# Patient Record
Sex: MALE | Race: WHITE | NOT HISPANIC OR LATINO | URBAN - METROPOLITAN AREA
[De-identification: names, ages, dates, MRNs, and addresses within clinical notes are randomized per-mention and may not be internally consistent; named-entity substitution may affect disease eponyms.]

---

## 2019-10-20 ENCOUNTER — INPATIENT (INPATIENT)
Facility: HOSPITAL | Age: 79
LOS: 1 days | Discharge: ANOTHER HOSPITAL | DRG: 282 | End: 2019-10-22
Attending: INTERNAL MEDICINE | Admitting: INTERNAL MEDICINE
Payer: MEDICARE

## 2019-10-20 VITALS
HEART RATE: 82 BPM | OXYGEN SATURATION: 100 % | DIASTOLIC BLOOD PRESSURE: 69 MMHG | RESPIRATION RATE: 20 BRPM | HEIGHT: 69 IN | WEIGHT: 210.1 LBS | TEMPERATURE: 97 F | SYSTOLIC BLOOD PRESSURE: 121 MMHG

## 2019-10-20 DIAGNOSIS — I48.91 UNSPECIFIED ATRIAL FIBRILLATION: ICD-10-CM

## 2019-10-20 DIAGNOSIS — I10 ESSENTIAL (PRIMARY) HYPERTENSION: ICD-10-CM

## 2019-10-20 DIAGNOSIS — E11.9 TYPE 2 DIABETES MELLITUS WITHOUT COMPLICATIONS: ICD-10-CM

## 2019-10-20 DIAGNOSIS — I25.810 ATHEROSCLEROSIS OF CORONARY ARTERY BYPASS GRAFT(S) WITHOUT ANGINA PECTORIS: ICD-10-CM

## 2019-10-20 DIAGNOSIS — Z29.9 ENCOUNTER FOR PROPHYLACTIC MEASURES, UNSPECIFIED: ICD-10-CM

## 2019-10-20 LAB
ACETONE SERPL-MCNC: NEGATIVE — SIGNIFICANT CHANGE UP
ALBUMIN SERPL ELPH-MCNC: 3.8 G/DL — SIGNIFICANT CHANGE UP (ref 3.5–5)
ALP SERPL-CCNC: 78 U/L — SIGNIFICANT CHANGE UP (ref 40–120)
ALT FLD-CCNC: 25 U/L DA — SIGNIFICANT CHANGE UP (ref 10–60)
ANION GAP SERPL CALC-SCNC: 7 MMOL/L — SIGNIFICANT CHANGE UP (ref 5–17)
APPEARANCE UR: ABNORMAL
APTT BLD: 37.2 SEC — HIGH (ref 27.5–36.3)
AST SERPL-CCNC: 20 U/L — SIGNIFICANT CHANGE UP (ref 10–40)
BACTERIA # UR AUTO: ABNORMAL /HPF
BILIRUB SERPL-MCNC: 1.3 MG/DL — HIGH (ref 0.2–1.2)
BILIRUB UR-MCNC: NEGATIVE — SIGNIFICANT CHANGE UP
BUN SERPL-MCNC: 17 MG/DL — SIGNIFICANT CHANGE UP (ref 7–18)
CALCIUM SERPL-MCNC: 8.5 MG/DL — SIGNIFICANT CHANGE UP (ref 8.4–10.5)
CHLORIDE SERPL-SCNC: 105 MMOL/L — SIGNIFICANT CHANGE UP (ref 96–108)
CO2 SERPL-SCNC: 26 MMOL/L — SIGNIFICANT CHANGE UP (ref 22–31)
COLOR SPEC: YELLOW — SIGNIFICANT CHANGE UP
CREAT SERPL-MCNC: 1.03 MG/DL — SIGNIFICANT CHANGE UP (ref 0.5–1.3)
DIFF PNL FLD: ABNORMAL
EPI CELLS # UR: SIGNIFICANT CHANGE UP /HPF
GLUCOSE SERPL-MCNC: 117 MG/DL — HIGH (ref 70–99)
GLUCOSE UR QL: 50 MG/DL
HCT VFR BLD CALC: 46.2 % — SIGNIFICANT CHANGE UP (ref 39–50)
HGB BLD-MCNC: 15.6 G/DL — SIGNIFICANT CHANGE UP (ref 13–17)
INR BLD: 1.03 RATIO — SIGNIFICANT CHANGE UP (ref 0.88–1.16)
KETONES UR-MCNC: NEGATIVE — SIGNIFICANT CHANGE UP
LEUKOCYTE ESTERASE UR-ACNC: NEGATIVE — SIGNIFICANT CHANGE UP
LIDOCAIN IGE QN: 77 U/L — SIGNIFICANT CHANGE UP (ref 73–393)
MAGNESIUM SERPL-MCNC: 2.2 MG/DL — SIGNIFICANT CHANGE UP (ref 1.6–2.6)
MCHC RBC-ENTMCNC: 28.8 PG — SIGNIFICANT CHANGE UP (ref 27–34)
MCHC RBC-ENTMCNC: 33.8 GM/DL — SIGNIFICANT CHANGE UP (ref 32–36)
MCV RBC AUTO: 85.4 FL — SIGNIFICANT CHANGE UP (ref 80–100)
NITRITE UR-MCNC: NEGATIVE — SIGNIFICANT CHANGE UP
NRBC # BLD: 0 /100 WBCS — SIGNIFICANT CHANGE UP (ref 0–0)
NT-PROBNP SERPL-SCNC: 217 PG/ML — SIGNIFICANT CHANGE UP (ref 0–450)
OB PNL STL: NEGATIVE — SIGNIFICANT CHANGE UP
PH UR: 6 — SIGNIFICANT CHANGE UP (ref 5–8)
PLATELET # BLD AUTO: 195 K/UL — SIGNIFICANT CHANGE UP (ref 150–400)
POTASSIUM SERPL-MCNC: 3.7 MMOL/L — SIGNIFICANT CHANGE UP (ref 3.5–5.3)
POTASSIUM SERPL-SCNC: 3.7 MMOL/L — SIGNIFICANT CHANGE UP (ref 3.5–5.3)
PROT SERPL-MCNC: 7.8 G/DL — SIGNIFICANT CHANGE UP (ref 6–8.3)
PROT UR-MCNC: 100
PROTHROM AB SERPL-ACNC: 11.4 SEC — SIGNIFICANT CHANGE UP (ref 10–12.9)
RBC # BLD: 5.41 M/UL — SIGNIFICANT CHANGE UP (ref 4.2–5.8)
RBC # FLD: 13.2 % — SIGNIFICANT CHANGE UP (ref 10.3–14.5)
RBC CASTS # UR COMP ASSIST: ABNORMAL /HPF (ref 0–2)
SODIUM SERPL-SCNC: 138 MMOL/L — SIGNIFICANT CHANGE UP (ref 135–145)
SP GR SPEC: 1.01 — SIGNIFICANT CHANGE UP (ref 1.01–1.02)
TROPONIN I SERPL-MCNC: 0.04 NG/ML — SIGNIFICANT CHANGE UP (ref 0–0.04)
UROBILINOGEN FLD QL: NEGATIVE — SIGNIFICANT CHANGE UP
WBC # BLD: 7.06 K/UL — SIGNIFICANT CHANGE UP (ref 3.8–10.5)
WBC # FLD AUTO: 7.06 K/UL — SIGNIFICANT CHANGE UP (ref 3.8–10.5)
WBC UR QL: SIGNIFICANT CHANGE UP /HPF (ref 0–5)

## 2019-10-20 PROCEDURE — 71045 X-RAY EXAM CHEST 1 VIEW: CPT | Mod: 26

## 2019-10-20 PROCEDURE — 99291 CRITICAL CARE FIRST HOUR: CPT

## 2019-10-20 RX ORDER — DILTIAZEM HCL 120 MG
10 CAPSULE, EXT RELEASE 24 HR ORAL ONCE
Refills: 0 | Status: COMPLETED | OUTPATIENT
Start: 2019-10-20 | End: 2019-10-20

## 2019-10-20 RX ORDER — DILTIAZEM HCL 120 MG
30 CAPSULE, EXT RELEASE 24 HR ORAL ONCE
Refills: 0 | Status: COMPLETED | OUTPATIENT
Start: 2019-10-20 | End: 2019-10-20

## 2019-10-20 RX ORDER — ENOXAPARIN SODIUM 100 MG/ML
95 INJECTION SUBCUTANEOUS
Refills: 0 | Status: DISCONTINUED | OUTPATIENT
Start: 2019-10-21 | End: 2019-10-22

## 2019-10-20 RX ORDER — ENOXAPARIN SODIUM 100 MG/ML
80 INJECTION SUBCUTANEOUS ONCE
Refills: 0 | Status: COMPLETED | OUTPATIENT
Start: 2019-10-20 | End: 2019-10-20

## 2019-10-20 RX ORDER — INSULIN LISPRO 100/ML
VIAL (ML) SUBCUTANEOUS
Refills: 0 | Status: DISCONTINUED | OUTPATIENT
Start: 2019-10-20 | End: 2019-10-22

## 2019-10-20 RX ORDER — METOPROLOL TARTRATE 50 MG
50 TABLET ORAL
Refills: 0 | Status: DISCONTINUED | OUTPATIENT
Start: 2019-10-20 | End: 2019-10-22

## 2019-10-20 RX ORDER — ATORVASTATIN CALCIUM 80 MG/1
40 TABLET, FILM COATED ORAL AT BEDTIME
Refills: 0 | Status: DISCONTINUED | OUTPATIENT
Start: 2019-10-20 | End: 2019-10-22

## 2019-10-20 RX ORDER — ISOSORBIDE MONONITRATE 60 MG/1
30 TABLET, EXTENDED RELEASE ORAL DAILY
Refills: 0 | Status: DISCONTINUED | OUTPATIENT
Start: 2019-10-20 | End: 2019-10-22

## 2019-10-20 RX ORDER — FUROSEMIDE 40 MG
40 TABLET ORAL DAILY
Refills: 0 | Status: DISCONTINUED | OUTPATIENT
Start: 2019-10-20 | End: 2019-10-21

## 2019-10-20 RX ORDER — ASPIRIN/CALCIUM CARB/MAGNESIUM 324 MG
162 TABLET ORAL ONCE
Refills: 0 | Status: COMPLETED | OUTPATIENT
Start: 2019-10-20 | End: 2019-10-20

## 2019-10-20 RX ORDER — LISINOPRIL 2.5 MG/1
40 TABLET ORAL DAILY
Refills: 0 | Status: DISCONTINUED | OUTPATIENT
Start: 2019-10-20 | End: 2019-10-22

## 2019-10-20 RX ORDER — ASPIRIN/CALCIUM CARB/MAGNESIUM 324 MG
81 TABLET ORAL DAILY
Refills: 0 | Status: DISCONTINUED | OUTPATIENT
Start: 2019-10-20 | End: 2019-10-22

## 2019-10-20 RX ADMIN — Medication 162 MILLIGRAM(S): at 19:52

## 2019-10-20 RX ADMIN — ENOXAPARIN SODIUM 80 MILLIGRAM(S): 100 INJECTION SUBCUTANEOUS at 20:09

## 2019-10-20 RX ADMIN — Medication 10 MILLIGRAM(S): at 20:10

## 2019-10-20 RX ADMIN — Medication 30 MILLIGRAM(S): at 20:52

## 2019-10-20 NOTE — H&P ADULT - PROBLEM SELECTOR PLAN 1
-Patient p/w chest pain and palpitations.   -EKG- Afib with RVR   -Troponin x 1 negative   -S/p Diltiazem 30mg oral and Diltiazem 10mg IV push in ED   -CHADSVASc Score 4 points.  -Start Lovenox 1mg/kg BID   -C/w metoprolol 50 mg BID   -Monitor HR   -Follow ECHO and CE x 3   -Primary team to obtain records from outpatient cardiologist*   -Cardiology consult- Dr Knutson

## 2019-10-20 NOTE — ED PROVIDER NOTE - CARE PLAN
Principal Discharge DX:	New onset atrial fibrillation  Secondary Diagnosis:	ACS (acute coronary syndrome)

## 2019-10-20 NOTE — H&P ADULT - ATTENDING COMMENTS
above  noted  78 Citizen of the [place] speaking male, from home, walks independently with PMH significant for HTN, HLD, DM, Arrythmia (unsure if AFib, not on any AC), MI x2 no stents, last MI in 2000 presents to ED with c/o chest pain and palpitations. Patient states that since afternoon he has been experiencing sub sternal chest pain 5/10 in intensity, burning in nature, non exertional, no aggravating or relieving factors identified. He said his pain is associated with palpitations and feels like his heart is pounding. He denies any shortness of breath, presyncope or syncope. All other ROS negative. Patient has no history of melena, hematuria or any other bleeding. He went to cardiologist Dr Bolton at [facility]  3 weeks ago and had test done which were normal.  Labs  EKG Radiology report  reviewed    Elderly  male  with  precordial chest pain elevated troponin  Acute  NSTEMI  New  onset  of  Afib with RVR   CAD HTN DM type 2  HLD   Admit to telemetry  as  per  protocol  Cardiology evaluation resume  home  medications  anticoagulation

## 2019-10-20 NOTE — ED PROVIDER NOTE - OBJECTIVE STATEMENT
77 y/o M with past hx of A-fib, HTN, DM, HLD, MI x2 (no stents) presents to the ED c/o constant left CP which he describes as burning which onset this morning. Pt also notes of palpitations and SOB at times. He is on aspirin but no other anticoagulations. Former smoker, NKDA. He denies dizziness or LOC. No further complaints at this time.

## 2019-10-20 NOTE — ED ADULT NURSE NOTE - ED STAT RN HANDOFF DETAILS
Pt stable, in no distress, admitted to [facility], on wall monitor, endorsed to KARY Perry

## 2019-10-20 NOTE — H&P ADULT - PROBLEM SELECTOR PLAN 5
IMPROVE VTE score:  [ ] Previous VTE                                                3  [ ] Thrombophilia                                             2  [ ] Lower limb paralysis                                  2        (unable to hold up >15 seconds)    [ ] Current Cancer (within 6 months)            2   [x] Immobilization > 24 hrs                              1  [ ] ICU/CCU stay > 24 hours                            1  [x] Age > 60                                                         1  Lovenox SC

## 2019-10-20 NOTE — H&P ADULT - ASSESSMENT
78 Guatem[place] speaking male, from home, walks independently with PMH significant for HTN, HLD, DM, Arrythmia (unsure if AFib, not on any AC), MI x2 no stents, last MI in 2000 presents to ED with c/o chest pain and palpitations. In ED, EKG showed Afib with RVR   Patient's HR was elevated and he received diltiazem along with full dose of anticoagulation. Troponin were negative and chest pain was resolved. \    Patient is admitted for Afib with RVR.

## 2019-10-20 NOTE — H&P ADULT - PROBLEM SELECTOR PLAN 3
-Patient has hx of HTN   -BP stable, resume home meds   -Amlodipine held as B-Blocker dose is increased, may resume if BP gets high

## 2019-10-20 NOTE — ED ADULT NURSE NOTE - NSIMPLEMENTINTERV_GEN_ALL_ED
Implemented All Universal Safety Interventions:  [place] to call system. Call bell, personal items and telephone within reach. Instruct patient to call for assistance. Room bathroom lighting operational. Non-slip footwear when patient is off stretcher. Physically safe environment: no spills, clutter or unnecessary equipment. Stretcher in lowest position, wheels locked, appropriate side rails in place.

## 2019-10-20 NOTE — H&P ADULT - HISTORY OF PRESENT ILLNESS
78 [place]an speaking male, from home, walks independently with PMH significant for HTN, HLD, DM, Arrythmia (unsure if AFib, not on any AC), MI x2 no stents, last MI in 2000 presents to ED with c/o chest pain and palpitations. Patient states that since afternoon he has been experiencing sub sternal chest pain 5/10 in intensity, burning in nature, non exertional, no aggravating or relieving factors identified. He said his pain is associated with palpitations and feels like his heart is pounding. He denies any shortness of breath, presyncope or syncope. All other ROS negative. Patient has no history of melena, hematuria or any other bleeding. He went to cardiologist Dr Bolton at [facility]  3 weeks ago and had test done which were normal.

## 2019-10-21 DIAGNOSIS — Z71.89 OTHER SPECIFIED COUNSELING: ICD-10-CM

## 2019-10-21 DIAGNOSIS — Z02.9 ENCOUNTER FOR ADMINISTRATIVE EXAMINATIONS, UNSPECIFIED: ICD-10-CM

## 2019-10-21 LAB
ALBUMIN SERPL ELPH-MCNC: 3.1 G/DL — LOW (ref 3.5–5)
ALP SERPL-CCNC: 66 U/L — SIGNIFICANT CHANGE UP (ref 40–120)
ALT FLD-CCNC: 22 U/L DA — SIGNIFICANT CHANGE UP (ref 10–60)
ANION GAP SERPL CALC-SCNC: 4 MMOL/L — LOW (ref 5–17)
AST SERPL-CCNC: 25 U/L — SIGNIFICANT CHANGE UP (ref 10–40)
BASOPHILS # BLD AUTO: 0.03 K/UL — SIGNIFICANT CHANGE UP (ref 0–0.2)
BASOPHILS NFR BLD AUTO: 0.4 % — SIGNIFICANT CHANGE UP (ref 0–2)
BILIRUB SERPL-MCNC: 1.5 MG/DL — HIGH (ref 0.2–1.2)
BUN SERPL-MCNC: 20 MG/DL — HIGH (ref 7–18)
CALCIUM SERPL-MCNC: 8.3 MG/DL — LOW (ref 8.4–10.5)
CHLORIDE SERPL-SCNC: 105 MMOL/L — SIGNIFICANT CHANGE UP (ref 96–108)
CHOLEST SERPL-MCNC: 232 MG/DL — HIGH (ref 10–199)
CK MB BLD-MCNC: 6.1 % — HIGH (ref 0–3.5)
CK MB CFR SERPL CALC: 18.4 NG/ML — HIGH (ref 0–3.6)
CK SERPL-CCNC: 300 U/L — HIGH (ref 35–232)
CO2 SERPL-SCNC: 27 MMOL/L — SIGNIFICANT CHANGE UP (ref 22–31)
CREAT SERPL-MCNC: 0.92 MG/DL — SIGNIFICANT CHANGE UP (ref 0.5–1.3)
EOSINOPHIL # BLD AUTO: 0.25 K/UL — SIGNIFICANT CHANGE UP (ref 0–0.5)
EOSINOPHIL NFR BLD AUTO: 3.6 % — SIGNIFICANT CHANGE UP (ref 0–6)
GLUCOSE BLDC GLUCOMTR-MCNC: 215 MG/DL — HIGH (ref 70–99)
GLUCOSE BLDC GLUCOMTR-MCNC: 230 MG/DL — HIGH (ref 70–99)
GLUCOSE BLDC GLUCOMTR-MCNC: 87 MG/DL — SIGNIFICANT CHANGE UP (ref 70–99)
GLUCOSE BLDC GLUCOMTR-MCNC: 95 MG/DL — SIGNIFICANT CHANGE UP (ref 70–99)
GLUCOSE SERPL-MCNC: 104 MG/DL — HIGH (ref 70–99)
HBA1C BLD-MCNC: 6.7 % — HIGH (ref 4–5.6)
HCT VFR BLD CALC: 41.8 % — SIGNIFICANT CHANGE UP (ref 39–50)
HDLC SERPL-MCNC: 48 MG/DL — SIGNIFICANT CHANGE UP
HGB BLD-MCNC: 14.2 G/DL — SIGNIFICANT CHANGE UP (ref 13–17)
IMM GRANULOCYTES NFR BLD AUTO: 1 % — SIGNIFICANT CHANGE UP (ref 0–1.5)
INR BLD: 1.12 RATIO — SIGNIFICANT CHANGE UP (ref 0.88–1.16)
LIPID PNL WITH DIRECT LDL SERPL: 159 MG/DL — SIGNIFICANT CHANGE UP
LYMPHOCYTES # BLD AUTO: 2.34 K/UL — SIGNIFICANT CHANGE UP (ref 1–3.3)
LYMPHOCYTES # BLD AUTO: 34.1 % — SIGNIFICANT CHANGE UP (ref 13–44)
MAGNESIUM SERPL-MCNC: 2.2 MG/DL — SIGNIFICANT CHANGE UP (ref 1.6–2.6)
MCHC RBC-ENTMCNC: 28.9 PG — SIGNIFICANT CHANGE UP (ref 27–34)
MCHC RBC-ENTMCNC: 34 GM/DL — SIGNIFICANT CHANGE UP (ref 32–36)
MCV RBC AUTO: 85 FL — SIGNIFICANT CHANGE UP (ref 80–100)
MONOCYTES # BLD AUTO: 0.78 K/UL — SIGNIFICANT CHANGE UP (ref 0–0.9)
MONOCYTES NFR BLD AUTO: 11.4 % — SIGNIFICANT CHANGE UP (ref 2–14)
NEUTROPHILS # BLD AUTO: 3.39 K/UL — SIGNIFICANT CHANGE UP (ref 1.8–7.4)
NEUTROPHILS NFR BLD AUTO: 49.5 % — SIGNIFICANT CHANGE UP (ref 43–77)
NRBC # BLD: 0 /100 WBCS — SIGNIFICANT CHANGE UP (ref 0–0)
PHOSPHATE SERPL-MCNC: 3.7 MG/DL — SIGNIFICANT CHANGE UP (ref 2.5–4.5)
PLATELET # BLD AUTO: 189 K/UL — SIGNIFICANT CHANGE UP (ref 150–400)
POTASSIUM SERPL-MCNC: 3.9 MMOL/L — SIGNIFICANT CHANGE UP (ref 3.5–5.3)
POTASSIUM SERPL-SCNC: 3.9 MMOL/L — SIGNIFICANT CHANGE UP (ref 3.5–5.3)
PROT SERPL-MCNC: 6.7 G/DL — SIGNIFICANT CHANGE UP (ref 6–8.3)
PROTHROM AB SERPL-ACNC: 12.5 SEC — SIGNIFICANT CHANGE UP (ref 10–12.9)
RBC # BLD: 4.92 M/UL — SIGNIFICANT CHANGE UP (ref 4.2–5.8)
RBC # FLD: 13.1 % — SIGNIFICANT CHANGE UP (ref 10.3–14.5)
SODIUM SERPL-SCNC: 136 MMOL/L — SIGNIFICANT CHANGE UP (ref 135–145)
TOTAL CHOLESTEROL/HDL RATIO MEASUREMENT: 4.8 RATIO — SIGNIFICANT CHANGE UP (ref 3.4–9.6)
TRIGL SERPL-MCNC: 126 MG/DL — SIGNIFICANT CHANGE UP (ref 10–149)
TROPONIN I SERPL-MCNC: 1.83 NG/ML — HIGH (ref 0–0.04)
TROPONIN I SERPL-MCNC: 2.02 NG/ML — HIGH (ref 0–0.04)
TROPONIN I SERPL-MCNC: 2.49 NG/ML — HIGH (ref 0–0.04)
TSH SERPL-MCNC: 2.26 UU/ML — SIGNIFICANT CHANGE UP (ref 0.34–4.82)
VIT B12 SERPL-MCNC: 383 PG/ML — SIGNIFICANT CHANGE UP (ref 232–1245)
WBC # BLD: 6.86 K/UL — SIGNIFICANT CHANGE UP (ref 3.8–10.5)
WBC # FLD AUTO: 6.86 K/UL — SIGNIFICANT CHANGE UP (ref 3.8–10.5)

## 2019-10-21 RX ORDER — PANTOPRAZOLE SODIUM 20 MG/1
40 TABLET, DELAYED RELEASE ORAL
Refills: 0 | Status: DISCONTINUED | OUTPATIENT
Start: 2019-10-21 | End: 2019-10-22

## 2019-10-21 RX ADMIN — ATORVASTATIN CALCIUM 40 MILLIGRAM(S): 80 TABLET, FILM COATED ORAL at 21:34

## 2019-10-21 RX ADMIN — ENOXAPARIN SODIUM 95 MILLIGRAM(S): 100 INJECTION SUBCUTANEOUS at 05:59

## 2019-10-21 RX ADMIN — PANTOPRAZOLE SODIUM 40 MILLIGRAM(S): 20 TABLET, DELAYED RELEASE ORAL at 18:42

## 2019-10-21 RX ADMIN — Medication 81 MILLIGRAM(S): at 11:50

## 2019-10-21 RX ADMIN — Medication 2: at 11:50

## 2019-10-21 RX ADMIN — Medication 50 MILLIGRAM(S): at 18:42

## 2019-10-21 RX ADMIN — ENOXAPARIN SODIUM 95 MILLIGRAM(S): 100 INJECTION SUBCUTANEOUS at 18:42

## 2019-10-21 NOTE — CONSULT NOTE ADULT - SUBJECTIVE AND OBJECTIVE BOX
CHIEF COMPLAINT:Patient is a 78y old  Male who presents with a chief complaint of Chest pain, palpitations.      HPI:  78 yr old  Malagasy speaking male, from home, walks independently with PMHX significant for HTN, HLD, DM, Arrythmia (unsure if AFib, not on any AC), MI x2 no stents, last MI in 2000 presents to ED with c/o chest pain and palpitations. Patient states that since afternoon he has been experiencing sub sternal chest pain 5/10 in intensity, burning in nature, non exertional, no aggravating or relieving factors identified. He said his pain is associated with palpitations and feels like his heart is pounding. He denies any shortness of breath, presyncope or syncope. All other ROS negative. Patient has no history of melena, hematuria or any other bleeding.      PAST MEDICAL & SURGICAL HISTORY:  CAD-s/p MI (myocardial infarction) and PTCA  DM (diabetes mellitus)  HTN (hypertension)        MEDICATIONS  (STANDING):  aspirin enteric coated 81 milliGRAM(s) Oral daily  atorvastatin 40 milliGRAM(s) Oral at bedtime  enoxaparin Injectable 95 milliGRAM(s) SubCutaneous two times a day  insulin lispro (HumaLOG) corrective regimen sliding scale   SubCutaneous Before meals and at bedtime  isosorbide   mononitrate ER Tablet (IMDUR) 30 milliGRAM(s) Oral daily  lisinopril 40 milliGRAM(s) Oral daily  metoprolol tartrate 50 milliGRAM(s) Oral two times a day    MEDICATIONS  (PRN):      FAMILY HISTORY:Nop hx of CAD      SOCIAL HISTORY:    [x ] Non-smoker    [x ] Alcohol-denies    Allergies    No Known Allergies    Intolerances    	    REVIEW OF SYSTEMS:  CONSTITUTIONAL: No fever, weight loss, or fatigue  EYES: No eye pain, visual disturbances, or discharge  ENT:  No difficulty hearing, tinnitus, vertigo; No sinus or throat pain  NECK: No pain or stiffness  RESPIRATORY: No cough, wheezing, chills or hemoptysis; No Shortness of Breath  CARDIOVASCULAR: + chest pain,+ palpitations,No  passing out, dizziness, or leg swelling  GASTROINTESTINAL: No abdominal or epigastric pain. No nausea, vomiting, or hematemesis; No diarrhea or constipation. No melena or hematochezia.  GENITOURINARY: No dysuria, frequency, hematuria, or incontinence  NEUROLOGICAL: No headaches, memory loss, loss of strength, numbness, or tremors  SKIN: No itching, burning, rashes, or lesions   LYMPH Nodes: No enlarged glands  ENDOCRINE: No heat or cold intolerance; No hair loss  MUSCULOSKELETAL: No joint pain or swelling; No muscle, back, or extremity pain  PSYCHIATRIC: No depression, anxiety, mood swings, or difficulty sleeping  HEME/LYMPH: No easy bruising, or bleeding gums  ALLERGY AND IMMUNOLOGIC: No hives or eczema	      PHYSICAL EXAM:  T(C): 36.4 (10-21-19 @ 07:00), Max: 36.9 (10-20-19 @ 20:10)  HR: 80 (10-21-19 @ 07:00) (54 - 110)  BP: 129/60 (10-21-19 @ 07:00) (98/56 - 129/60)  RR: 18 (10-21-19 @ 07:00) (18 - 23)  SpO2: 99% (10-21-19 @ 07:00) (95% - 100%)  Wt(kg): --  I&O's Summary      Appearance: Normal	  HEENT:   Normal oral mucosa, PERRL, EOMI	  Lymphatic: No lymphadenopathy  Cardiovascular: Normal S1 S2, No JVD, No murmurs, No edema  Respiratory: Lungs clear to auscultation	  Psychiatry: A & O x 3, Mood & affect appropriate  Gastrointestinal:  Soft, Non-tender, + BS	  Skin: No rashes, No ecchymoses, No cyanosis	  Neurologic: Non-focal  Extremities: Normal range of motion, No clubbing, cyanosis or edema  Vascular: Peripheral pulses palpable 2+ bilaterally    	    ECG:  	afib,RBBB,q inferior leads  	  LABS:	 	    CARDIAC MARKERS:  CARDIAC MARKERS ( 21 Oct 2019 05:44 )  1.830 ng/mL / x     / 300 U/L / x     / 18.4 ng/mL  CARDIAC MARKERS ( 20 Oct 2019 19:55 )  0.040 ng/mL / x     / x     / x     / x                                  14.2   6.86  )-----------( 189      ( 21 Oct 2019 05:43 )             41.8     10-21    136  |  105  |  20<H>  ----------------------------<  104<H>  3.9   |  27  |  0.92    Ca    8.3<L>      21 Oct 2019 05:43  Phos  3.7     10-21  Mg     2.2     10-21    TPro  6.7  /  Alb  3.1<L>  /  TBili  1.5<H>  /  DBili  x   /  AST  25  /  ALT  22  /  AlkPhos  66  10-21    proBNP: Serum Pro-Brain Natriuretic Peptide: 217 pg/mL (10-20 @ 19:55)    Lipid Profile: Cholesterol 232    HDL 48        TSH: Thyroid Stimulating Hormone, Serum: 2.26 uU/mL (10-21 @ 05:43)  Thyroid Stimulating Hormone, Serum: 1.87 uU/mL (10-20 @ 19:55)

## 2019-10-21 NOTE — ED ADULT NURSE REASSESSMENT NOTE - NS ED NURSE REASSESS COMMENT FT1
Pt reassessed, observed laying in bed, breathing room air, in no respiratory distress at time of reassessment. Pt is A&O x3, able to make needs known, denies any distress/discomfort at this time. Pt ambulates on own, skin intact, left AC #20GA in place. A.M meds administered as ordered, tolerated well. BP meds held BP this morning 104/51mmHg, pt denies any dizziness at this time. Admitted to [facility], on wall monitor, sinus rhythm noted, HR 58 at this time. Awaiting bed, nursing monitoring continues. No family at bedside at this time.

## 2019-10-21 NOTE — PROGRESS NOTE ADULT - PROBLEM SELECTOR PLAN 3
BP controlled  Continue with Imdur 30 mg QD, Lisinopril 40 QD and metoprolol 50 BID  Continue to monitor BP   Dr. Knutson (Cardiology) following

## 2019-10-21 NOTE — PROGRESS NOTE ADULT - ATTENDING COMMENTS
Above  noted  discussed with patient  his  wife  and  Cardiology  Patient  will be schedule  for  Coronary angiogram  in am  Hold  Lovenox  in AM

## 2019-10-21 NOTE — PROGRESS NOTE ADULT - PROBLEM SELECTOR PLAN 1
EKG indicates A-fib with RVR  Continue with Troponin Q8H until peaked   Continue with metoprolol 50 mg BID  Continue with full dose Lovenox  F/u Echo  Continue with Telemetry monitoring  Dr. Knutson (Cardiology) following

## 2019-10-21 NOTE — PROGRESS NOTE ADULT - PROBLEM SELECTOR PLAN 2
A1C 6.7  Glucose controlled  Pt takes Metformin 500 mg QD at home  Continue with sliding scale insulin   Continue to monitor blood glucose   Continue with carb controlled diet

## 2019-10-21 NOTE — CONSULT NOTE ADULT - ASSESSMENT
78 yr old  [place] speaking male, from home, walks independently with PMHX significant for HTN, HLD, DM, Arrythmia (unsure if AFib, not on any AC), MI x2 no stents, last MI in 2000 presents to ED with c/o chest pain and palpitations,positive troponin.  1.Tele monitoring, CE q8 until peak.  2.Echocardiogram to eval LV fx and decide on invasive vs non-invasive strategy.  3.Afib-lovenox,lopressor.  4.CAD-asa,b blocker,statin,Imdur.  5.HTN-Ace.  6.DM-Insulin.  7.PPI.

## 2019-10-22 ENCOUNTER — TRANSCRIPTION ENCOUNTER (OUTPATIENT)
Age: 79
End: 2019-10-22

## 2019-10-22 ENCOUNTER — INPATIENT (INPATIENT)
Facility: HOSPITAL | Age: 79
LOS: 0 days | Discharge: HOME | DRG: 247 | End: 2019-10-23
Attending: INTERNAL MEDICINE | Admitting: INTERNAL MEDICINE
Payer: MEDICARE

## 2019-10-22 VITALS
HEART RATE: 106 BPM | TEMPERATURE: 98 F | DIASTOLIC BLOOD PRESSURE: 65 MMHG | RESPIRATION RATE: 18 BRPM | OXYGEN SATURATION: 96 % | SYSTOLIC BLOOD PRESSURE: 122 MMHG

## 2019-10-22 VITALS
HEART RATE: 51 BPM | DIASTOLIC BLOOD PRESSURE: 65 MMHG | SYSTOLIC BLOOD PRESSURE: 118 MMHG | OXYGEN SATURATION: 97 % | TEMPERATURE: 97 F | RESPIRATION RATE: 17 BRPM

## 2019-10-22 DIAGNOSIS — I21.4 NON-ST ELEVATION (NSTEMI) MYOCARDIAL INFARCTION: ICD-10-CM

## 2019-10-22 PROBLEM — I10 ESSENTIAL (PRIMARY) HYPERTENSION: Chronic | Status: ACTIVE | Noted: 2019-10-20

## 2019-10-22 PROBLEM — E11.9 TYPE 2 DIABETES MELLITUS WITHOUT COMPLICATIONS: Chronic | Status: ACTIVE | Noted: 2019-10-20

## 2019-10-22 PROBLEM — I21.9 ACUTE MYOCARDIAL INFARCTION, UNSPECIFIED: Chronic | Status: ACTIVE | Noted: 2019-10-20

## 2019-10-22 LAB
ANION GAP SERPL CALC-SCNC: 5 MMOL/L — SIGNIFICANT CHANGE UP (ref 5–17)
BUN SERPL-MCNC: 22 MG/DL — HIGH (ref 7–18)
CALCIUM SERPL-MCNC: 8 MG/DL — LOW (ref 8.4–10.5)
CHLORIDE SERPL-SCNC: 105 MMOL/L — SIGNIFICANT CHANGE UP (ref 96–108)
CK MB BLD-MCNC: 3.5 % — SIGNIFICANT CHANGE UP (ref 0–3.5)
CK MB CFR SERPL CALC: 11.9 NG/ML — HIGH (ref 0–6.7)
CK SERPL-CCNC: 343 U/L — HIGH (ref 30–200)
CO2 SERPL-SCNC: 26 MMOL/L — SIGNIFICANT CHANGE UP (ref 22–31)
CREAT SERPL-MCNC: 0.94 MG/DL — SIGNIFICANT CHANGE UP (ref 0.5–1.3)
CULTURE RESULTS: SIGNIFICANT CHANGE UP
GLUCOSE BLDC GLUCOMTR-MCNC: 104 MG/DL — HIGH (ref 70–99)
GLUCOSE BLDC GLUCOMTR-MCNC: 108 MG/DL — HIGH (ref 70–99)
GLUCOSE BLDC GLUCOMTR-MCNC: 112 MG/DL — HIGH (ref 70–99)
GLUCOSE BLDC GLUCOMTR-MCNC: 136 MG/DL — HIGH (ref 70–99)
GLUCOSE SERPL-MCNC: 99 MG/DL — SIGNIFICANT CHANGE UP (ref 70–99)
HCT VFR BLD CALC: 43.1 % — SIGNIFICANT CHANGE UP (ref 39–50)
HGB BLD-MCNC: 14.3 G/DL — SIGNIFICANT CHANGE UP (ref 13–17)
MCHC RBC-ENTMCNC: 28.3 PG — SIGNIFICANT CHANGE UP (ref 27–34)
MCHC RBC-ENTMCNC: 33.2 GM/DL — SIGNIFICANT CHANGE UP (ref 32–36)
MCV RBC AUTO: 85.3 FL — SIGNIFICANT CHANGE UP (ref 80–100)
NRBC # BLD: 0 /100 WBCS — SIGNIFICANT CHANGE UP (ref 0–0)
PLATELET # BLD AUTO: 195 K/UL — SIGNIFICANT CHANGE UP (ref 150–400)
POTASSIUM SERPL-MCNC: 4.1 MMOL/L — SIGNIFICANT CHANGE UP (ref 3.5–5.3)
POTASSIUM SERPL-SCNC: 4.1 MMOL/L — SIGNIFICANT CHANGE UP (ref 3.5–5.3)
RBC # BLD: 5.05 M/UL — SIGNIFICANT CHANGE UP (ref 4.2–5.8)
RBC # FLD: 13 % — SIGNIFICANT CHANGE UP (ref 10.3–14.5)
SODIUM SERPL-SCNC: 136 MMOL/L — SIGNIFICANT CHANGE UP (ref 135–145)
SPECIMEN SOURCE: SIGNIFICANT CHANGE UP
TROPONIN I SERPL-MCNC: 2.74 NG/ML — HIGH (ref 0–0.04)
WBC # BLD: 6.92 K/UL — SIGNIFICANT CHANGE UP (ref 3.8–10.5)
WBC # FLD AUTO: 6.92 K/UL — SIGNIFICANT CHANGE UP (ref 3.8–10.5)

## 2019-10-22 PROCEDURE — 99152 MOD SED SAME PHYS/QHP 5/>YRS: CPT | Mod: GC

## 2019-10-22 PROCEDURE — 93306 TTE W/DOPPLER COMPLETE: CPT | Mod: 26

## 2019-10-22 PROCEDURE — ZZZZZ: CPT

## 2019-10-22 PROCEDURE — 93010 ELECTROCARDIOGRAM REPORT: CPT

## 2019-10-22 PROCEDURE — 92941 PRQ TRLML REVSC TOT OCCL AMI: CPT | Mod: LD,GC

## 2019-10-22 PROCEDURE — 93458 L HRT ARTERY/VENTRICLE ANGIO: CPT | Mod: 26,59,GC

## 2019-10-22 RX ORDER — ASPIRIN/CALCIUM CARB/MAGNESIUM 324 MG
81 TABLET ORAL DAILY
Refills: 0 | Status: DISCONTINUED | OUTPATIENT
Start: 2019-10-23 | End: 2019-10-23

## 2019-10-22 RX ORDER — GLUCAGON INJECTION, SOLUTION 0.5 MG/.1ML
1 INJECTION, SOLUTION SUBCUTANEOUS ONCE
Refills: 0 | Status: DISCONTINUED | OUTPATIENT
Start: 2019-10-22 | End: 2019-10-23

## 2019-10-22 RX ORDER — LISINOPRIL 2.5 MG/1
40 TABLET ORAL DAILY
Refills: 0 | Status: DISCONTINUED | OUTPATIENT
Start: 2019-10-22 | End: 2019-10-23

## 2019-10-22 RX ORDER — GABAPENTIN 400 MG/1
1 CAPSULE ORAL
Qty: 0 | Refills: 0 | DISCHARGE

## 2019-10-22 RX ORDER — METOPROLOL TARTRATE 50 MG
12.5 TABLET ORAL
Refills: 0 | Status: DISCONTINUED | OUTPATIENT
Start: 2019-10-22 | End: 2019-10-23

## 2019-10-22 RX ORDER — METOPROLOL TARTRATE 50 MG
0.5 TABLET ORAL
Qty: 30 | Refills: 0
Start: 2019-10-22 | End: 2019-11-20

## 2019-10-22 RX ORDER — METFORMIN HYDROCHLORIDE 850 MG/1
1 TABLET ORAL
Qty: 0 | Refills: 0 | DISCHARGE

## 2019-10-22 RX ORDER — APIXABAN 2.5 MG/1
1 TABLET, FILM COATED ORAL
Qty: 0 | Refills: 0 | DISCHARGE
Start: 2019-10-22

## 2019-10-22 RX ORDER — ENOXAPARIN SODIUM 100 MG/ML
95 INJECTION SUBCUTANEOUS
Qty: 0 | Refills: 0 | DISCHARGE
Start: 2019-10-22

## 2019-10-22 RX ORDER — FUROSEMIDE 40 MG
1 TABLET ORAL
Qty: 0 | Refills: 0 | DISCHARGE

## 2019-10-22 RX ORDER — RANOLAZINE 500 MG/1
1 TABLET, FILM COATED, EXTENDED RELEASE ORAL
Qty: 0 | Refills: 0 | DISCHARGE

## 2019-10-22 RX ORDER — AMLODIPINE BESYLATE AND BENAZEPRIL HYDROCHLORIDE 10; 20 MG/1; MG/1
1 CAPSULE ORAL
Qty: 0 | Refills: 0 | DISCHARGE

## 2019-10-22 RX ORDER — METOPROLOL TARTRATE 50 MG
1 TABLET ORAL
Qty: 0 | Refills: 0 | DISCHARGE

## 2019-10-22 RX ORDER — APIXABAN 2.5 MG/1
1 TABLET, FILM COATED ORAL
Qty: 180 | Refills: 1
Start: 2019-10-22 | End: 2020-04-18

## 2019-10-22 RX ORDER — DICLOFENAC SODIUM 30 MG/G
1 GEL TOPICAL
Qty: 0 | Refills: 0 | DISCHARGE

## 2019-10-22 RX ORDER — ESOMEPRAZOLE MAGNESIUM 40 MG/1
1 CAPSULE, DELAYED RELEASE ORAL
Qty: 0 | Refills: 0 | DISCHARGE

## 2019-10-22 RX ORDER — ASPIRIN/CALCIUM CARB/MAGNESIUM 324 MG
1 TABLET ORAL
Qty: 0 | Refills: 0 | DISCHARGE

## 2019-10-22 RX ORDER — PANTOPRAZOLE SODIUM 20 MG/1
40 TABLET, DELAYED RELEASE ORAL
Refills: 0 | Status: DISCONTINUED | OUTPATIENT
Start: 2019-10-22 | End: 2019-10-23

## 2019-10-22 RX ORDER — SODIUM CHLORIDE 9 MG/ML
1000 INJECTION, SOLUTION INTRAVENOUS
Refills: 0 | Status: DISCONTINUED | OUTPATIENT
Start: 2019-10-22 | End: 2019-10-23

## 2019-10-22 RX ORDER — METOPROLOL TARTRATE 50 MG
1 TABLET ORAL
Qty: 0 | Refills: 0 | DISCHARGE
Start: 2019-10-22

## 2019-10-22 RX ORDER — DEXTROSE 50 % IN WATER 50 %
25 SYRINGE (ML) INTRAVENOUS ONCE
Refills: 0 | Status: DISCONTINUED | OUTPATIENT
Start: 2019-10-22 | End: 2019-10-23

## 2019-10-22 RX ORDER — ISOSORBIDE MONONITRATE 60 MG/1
1 TABLET, EXTENDED RELEASE ORAL
Qty: 0 | Refills: 0 | DISCHARGE

## 2019-10-22 RX ORDER — GABAPENTIN 400 MG/1
100 CAPSULE ORAL DAILY
Refills: 0 | Status: DISCONTINUED | OUTPATIENT
Start: 2019-10-22 | End: 2019-10-23

## 2019-10-22 RX ORDER — INSULIN LISPRO 100/ML
VIAL (ML) SUBCUTANEOUS
Refills: 0 | Status: DISCONTINUED | OUTPATIENT
Start: 2019-10-22 | End: 2019-10-23

## 2019-10-22 RX ORDER — INSULIN LISPRO 100/ML
VIAL (ML) SUBCUTANEOUS AT BEDTIME
Refills: 0 | Status: DISCONTINUED | OUTPATIENT
Start: 2019-10-22 | End: 2019-10-23

## 2019-10-22 RX ORDER — CLOPIDOGREL BISULFATE 75 MG/1
1 TABLET, FILM COATED ORAL
Qty: 90 | Refills: 4
Start: 2019-10-22 | End: 2021-01-13

## 2019-10-22 RX ORDER — PANTOPRAZOLE SODIUM 20 MG/1
1 TABLET, DELAYED RELEASE ORAL
Qty: 0 | Refills: 0 | DISCHARGE
Start: 2019-10-22

## 2019-10-22 RX ORDER — ATORVASTATIN CALCIUM 80 MG/1
1 TABLET, FILM COATED ORAL
Qty: 0 | Refills: 0 | DISCHARGE
Start: 2019-10-22

## 2019-10-22 RX ORDER — ERGOCALCIFEROL 1.25 MG/1
1 CAPSULE ORAL
Qty: 0 | Refills: 0 | DISCHARGE

## 2019-10-22 RX ORDER — ATORVASTATIN CALCIUM 80 MG/1
40 TABLET, FILM COATED ORAL AT BEDTIME
Refills: 0 | Status: DISCONTINUED | OUTPATIENT
Start: 2019-10-22 | End: 2019-10-23

## 2019-10-22 RX ORDER — ASPIRIN/CALCIUM CARB/MAGNESIUM 324 MG
1 TABLET ORAL
Qty: 30 | Refills: 0
Start: 2019-10-22 | End: 2019-11-20

## 2019-10-22 RX ORDER — DEXTROSE 50 % IN WATER 50 %
15 SYRINGE (ML) INTRAVENOUS ONCE
Refills: 0 | Status: DISCONTINUED | OUTPATIENT
Start: 2019-10-22 | End: 2019-10-23

## 2019-10-22 RX ORDER — METOPROLOL TARTRATE 50 MG
25 TABLET ORAL
Refills: 0 | Status: DISCONTINUED | OUTPATIENT
Start: 2019-10-22 | End: 2019-10-22

## 2019-10-22 RX ORDER — DEXTROSE 50 % IN WATER 50 %
12.5 SYRINGE (ML) INTRAVENOUS ONCE
Refills: 0 | Status: DISCONTINUED | OUTPATIENT
Start: 2019-10-22 | End: 2019-10-23

## 2019-10-22 RX ORDER — APIXABAN 2.5 MG/1
5 TABLET, FILM COATED ORAL
Refills: 0 | Status: DISCONTINUED | OUTPATIENT
Start: 2019-10-23 | End: 2019-10-23

## 2019-10-22 RX ORDER — CLOPIDOGREL BISULFATE 75 MG/1
75 TABLET, FILM COATED ORAL DAILY
Refills: 0 | Status: DISCONTINUED | OUTPATIENT
Start: 2019-10-23 | End: 2019-10-23

## 2019-10-22 RX ORDER — LISINOPRIL 2.5 MG/1
1 TABLET ORAL
Qty: 0 | Refills: 0 | DISCHARGE
Start: 2019-10-22

## 2019-10-22 RX ORDER — INSULIN LISPRO 100/ML
0 VIAL (ML) SUBCUTANEOUS
Qty: 0 | Refills: 0 | DISCHARGE

## 2019-10-22 RX ORDER — ENOXAPARIN SODIUM 100 MG/ML
0 INJECTION SUBCUTANEOUS
Qty: 0 | Refills: 0 | DISCHARGE
Start: 2019-10-22

## 2019-10-22 RX ORDER — ROSUVASTATIN CALCIUM 5 MG/1
1 TABLET ORAL
Qty: 0 | Refills: 0 | DISCHARGE

## 2019-10-22 RX ORDER — ACETAMINOPHEN 500 MG
650 TABLET ORAL ONCE
Refills: 0 | Status: COMPLETED | OUTPATIENT
Start: 2019-10-22 | End: 2019-10-22

## 2019-10-22 RX ORDER — RANOLAZINE 500 MG/1
500 TABLET, FILM COATED, EXTENDED RELEASE ORAL
Refills: 0 | Status: DISCONTINUED | OUTPATIENT
Start: 2019-10-22 | End: 2019-10-23

## 2019-10-22 RX ORDER — ISOSORBIDE MONONITRATE 60 MG/1
30 TABLET, EXTENDED RELEASE ORAL DAILY
Refills: 0 | Status: DISCONTINUED | OUTPATIENT
Start: 2019-10-22 | End: 2019-10-23

## 2019-10-22 RX ORDER — CLOPIDOGREL BISULFATE 75 MG/1
1 TABLET, FILM COATED ORAL
Qty: 0 | Refills: 0 | DISCHARGE
Start: 2019-10-22

## 2019-10-22 RX ADMIN — ATORVASTATIN CALCIUM 40 MILLIGRAM(S): 80 TABLET, FILM COATED ORAL at 21:27

## 2019-10-22 RX ADMIN — Medication 25 MILLIGRAM(S): at 17:26

## 2019-10-22 RX ADMIN — PANTOPRAZOLE SODIUM 40 MILLIGRAM(S): 20 TABLET, DELAYED RELEASE ORAL at 06:34

## 2019-10-22 RX ADMIN — Medication 650 MILLIGRAM(S): at 18:00

## 2019-10-22 RX ADMIN — Medication 650 MILLIGRAM(S): at 18:47

## 2019-10-22 RX ADMIN — RANOLAZINE 500 MILLIGRAM(S): 500 TABLET, FILM COATED, EXTENDED RELEASE ORAL at 17:26

## 2019-10-22 RX ADMIN — LISINOPRIL 40 MILLIGRAM(S): 2.5 TABLET ORAL at 06:34

## 2019-10-22 RX ADMIN — Medication 50 MILLIGRAM(S): at 06:34

## 2019-10-22 RX ADMIN — GABAPENTIN 100 MILLIGRAM(S): 400 CAPSULE ORAL at 17:26

## 2019-10-22 NOTE — DISCHARGE NOTE PROVIDER - HOSPITAL COURSE
78yr  Citizen of [place] speaking male, (pacific  # [id number])  from home, walks independently with PMH significant for former smoker, HTN, HLD, MI x 2 (2000 + 1985), CAD - ? prior stents, Arrythmia (unsure if AFib, put on ASA), no implantable cardiac monitoring devices, T2DM - Aic 6.7 on 10/21- follow ed by PCP Dr Santiago   presents to [facility] ED on 10/20./19  with c/o chest pain and palpitations. Patient states that since that afternoon he has been experiencing sub sternal chest pain radiating to hunter [place]  5/10 in intensity, burning in nature, non exertional, no aggravating or relieving factors identified. He said his pain is associated with palpitations and feels like his heart is pounding.  Also reports recent ARORA over last few days- which subsides when he slows down.  All other ROS negative. Patient has no history of melena, hematuria or any other bleeding. He went to cardiologist Dr Bolton at [facility]  3 weeks ago and had stress test done which was reportedly  normal. Upon admission to [facility] was found to be in A Fib - given lovenox + BB. + troponins ruling in for NSTEMI.         10/22: NEYMAR x1 to prox LAD; to continue aspirin and plavix after discharge. Eliquis 5mg BID ordered to start 10/23 per Dr. Lam for afib. Patient to continue aspirin, plavix and eliquis for one month, then stop aspirin.     Echo: EF 55-60%, grossly normal LV function    10/23: 78yr  [place] citizen speaking male, (pacific  # [id number])  from home, walks independently with PMH significant for former smoker, HTN, HLD, MI x 2 (2000 + 1985), CAD - ? prior stents, Arrythmia (unsure if AFib, put on ASA), no implantable cardiac monitoring devices, T2DM - Aic 6.7 on 10/21- follow ed by PCP Dr Santiago   presents to [facility] ED on 10/20./19  with c/o chest pain and palpitations. Patient states that since that afternoon he has been experiencing sub sternal chest pain radiating to [place]  5/10 in intensity, burning in nature, non exertional, no aggravating or relieving factors identified. He said his pain is associated with palpitations and feels like his heart is pounding.  Also reports recent ARORA over last few days- which subsides when he slows down.  All other ROS negative. Patient has no history of melena, hematuria or any other bleeding. He went to cardiologist Dr Bolton at [facility]  3 weeks ago and had stress test done which was reportedly  normal. Upon admission to [facility] was found to be in A Fib - given lovenox + BB. + troponins ruling in for NSTEMI.         10/22: NEYMAR x1 to prox LAD; to continue aspirin and plavix after discharge. Eliquis 5mg BID ordered to start 10/23 per Dr. Lam for afib. Patient to continue aspirin, plavix and eliquis for one month, then stop aspirin.     Echo: EF 55-60%, grossly normal LV function    10/23: Metoprolol 25mg BID reduced to 12.5mg BID due to HR in 40s. 78yr  Iranian speaking male, (pacific  # [id number])  from home, walks independently with PMH significant for former smoker, HTN, HLD, MI x 2 (2000 + 1985), CAD - ? prior stents, Arrythmia (unsure if AFib, put on ASA), no implantable cardiac monitoring devices, T2DM - Aic 6.7 on 10/21- follow ed by PCP Dr Santiago   presents to [facility] ED on 10/20./19  with c/o chest pain and palpitations. Patient states that since that afternoon he has been experiencing sub sternal chest pain radiating to hunter [place]  5/10 in intensity, burning in nature, non exertional, no aggravating or relieving factors identified. He said his pain is associated with palpitations and feels like his heart is pounding.  Also reports recent ARORA over last few days- which subsides when he slows down.  All other ROS negative. Patient has no history of melena, hematuria or any other bleeding. He went to cardiologist Dr Bolton at [facility]  3 weeks ago and had stress test done which was reportedly  normal. Upon admission to [facility] was found to be in A Fib - given lovenox + BB. + troponins ruling in for NSTEMI. The patient has been approved for discharge by Dr. Lam.        10/22: NEYMAR x1 to prox LAD; to continue aspirin and plavix after discharge. Eliquis 5mg BID ordered to start 10/23 per Dr. Lam for afib. Patient to continue aspirin, plavix and eliquis for one month, then stop aspirin.     Echo: EF 55-60%, grossly normal LV function    10/23: Metoprolol 25mg BID reduced to 12.5mg BID due to HR in 40s.

## 2019-10-22 NOTE — DISCHARGE NOTE PROVIDER - NSDCCPCAREPLAN_GEN_ALL_CORE_FT
PRINCIPAL DISCHARGE DIAGNOSIS  Diagnosis: NSTEMI (non-ST elevated myocardial infarction)  Assessment and Plan of Treatment: received one stent; continue all medications as ordered and follow up with your doctor in 1-2 weeks.

## 2019-10-22 NOTE — DISCHARGE NOTE PROVIDER - NSDCCPTREATMENT_GEN_ALL_CORE_FT
PRINCIPAL PROCEDURE  Procedure: Left heart catheterization  Findings and Treatment: 10/22: received one stent to left anterior descending artery via right radial artery

## 2019-10-22 NOTE — PROGRESS NOTE ADULT - SUBJECTIVE AND OBJECTIVE BOX
CHIEF COMPLAINT:Patient is a 78y old  Male who presents with a chief complaint of Chest pain, palpitations.Pt appears comfortable.    	  REVIEW OF SYSTEMS:  CONSTITUTIONAL: No fever, weight loss, or fatigue  EYES: No eye pain, visual disturbances, or discharge  ENT:  No difficulty hearing, tinnitus, vertigo; No sinus or throat pain  NECK: No pain or stiffness  RESPIRATORY: No cough, wheezing, chills or hemoptysis; No Shortness of Breath  CARDIOVASCULAR: No chest pain, palpitations, passing out, dizziness, or leg swelling  GASTROINTESTINAL: No abdominal or epigastric pain. No nausea, vomiting, or hematemesis; No diarrhea or constipation. No melena or hematochezia.  GENITOURINARY: No dysuria, frequency, hematuria, or incontinence  NEUROLOGICAL: No headaches, memory loss, loss of strength, numbness, or tremors  SKIN: No itching, burning, rashes, or lesions   LYMPH Nodes: No enlarged glands  ENDOCRINE: No heat or cold intolerance; No hair loss  MUSCULOSKELETAL: No joint pain or swelling; No muscle, back, or extremity pain  PSYCHIATRIC: No depression, anxiety, mood swings, or difficulty sleeping  HEME/LYMPH: No easy bruising, or bleeding gums  ALLERGY AND IMMUNOLOGIC: No hives or eczema	      PHYSICAL EXAM:  T(C): 36.4 (10-22-19 @ 07:42), Max: 37 (10-22-19 @ 04:22)  HR: 106 (10-22-19 @ 07:42) (53 - 106)  BP: 122/65 (10-22-19 @ 07:42) (107/69 - 168/75)  RR: 18 (10-22-19 @ 07:42) (18 - 20)  SpO2: 96% (10-22-19 @ 07:42) (94% - 99%)        Appearance: Normal	  HEENT:   Normal oral mucosa, PERRL, EOMI	  Lymphatic: No lymphadenopathy  Cardiovascular: Normal S1 S2, No JVD, No murmurs, No edema  Respiratory: Lungs clear to auscultation	  Psychiatry: A & O x 3, Mood & affect appropriate  Gastrointestinal:  Soft, Non-tender, + BS	  Skin: No rashes, No ecchymoses, No cyanosis	  Neurologic: Non-focal  Extremities: Normal range of motion, No clubbing, cyanosis or edema  Vascular: Peripheral pulses palpable 2+ bilaterally    MEDICATIONS  (STANDING):  aspirin enteric coated 81 milliGRAM(s) Oral daily  atorvastatin 40 milliGRAM(s) Oral at bedtime  enoxaparin Injectable 95 milliGRAM(s) SubCutaneous two times a day  insulin lispro (HumaLOG) corrective regimen sliding scale   SubCutaneous Before meals and at bedtime  isosorbide   mononitrate ER Tablet (IMDUR) 30 milliGRAM(s) Oral daily  lisinopril 40 milliGRAM(s) Oral daily  metoprolol tartrate 50 milliGRAM(s) Oral two times a day  pantoprazole    Tablet 40 milliGRAM(s) Oral before breakfast      TELEMETRY: 	afib converted to nsr with pac's  ,pvc's down to 36  ECG:  	nsr with pac's, q inferior leads    	  LABS:	 	      CARDIAC MARKERS ( 22 Oct 2019 06:20 )  2.740 ng/mL / x     / x     / x     / x      CARDIAC MARKERS ( 21 Oct 2019 20:06 )  2.490 ng/mL / x     / x     / x     / x      CARDIAC MARKERS ( 21 Oct 2019 12:46 )  2.020 ng/mL / x     / x     / x     / x      CARDIAC MARKERS ( 21 Oct 2019 05:44 )  1.830 ng/mL / x     / 300 U/L / x     / 18.4 ng/mL  CARDIAC MARKERS ( 20 Oct 2019 19:55 )  0.040 ng/mL / x     / x     / x     / x                                    14.3   6.92  )-----------( 195      ( 22 Oct 2019 06:20 )             43.1     10-22    136  |  105  |  22<H>  ----------------------------<  99  4.1   |  26  |  0.94    Ca    8.0<L>      22 Oct 2019 06:20  Phos  3.7     10-21  Mg     2.2     10-21    TPro  6.7  /  Alb  3.1<L>  /  TBili  1.5<H>  /  DBili  x   /  AST  25  /  ALT  22  /  AlkPhos  66  10-21    proBNP: Serum Pro-Brain Natriuretic Peptide: 217 pg/mL (10-20 @ 19:55)    Lipid Profile: Cholesterol 232    HDL 48      HgA1c: Hemoglobin A1C, Whole Blood: 6.7 % (10-21 @ 10:15)    TSH: Thyroid Stimulating Hormone, Serum: 2.26 uU/mL (10-21 @ 05:43)  Thyroid Stimulating Hormone, Serum: 1.87 uU/mL (10-20 @ 19:55)
HPI:  78 yr old male, from home, with PMHX significant for HTN, HLD, DM, Arrythmia (unsure if AFib, not on any AC), MI x2 no stents, last MI in  presents to ED with c/o chest pain and palpitations.  Patient admitted for A-fib with RVR.  Patient currently spilling troponin and is being worked up by cardiology.    OVERNIGHT EVENTS:      REVIEW OF SYSTEMS:    CONSTITUTIONAL: No fever,   EYES: no acute visual disturbances  NECK: No pain or stiffness  RESPIRATORY: No cough; No shortness of breath  CARDIOVASCULAR: No chest pain, no palpitations  GASTROINTESTINAL: No pain. No nausea, vomiting or diarrhea   NEUROLOGICAL: No headache or numbness, no tremors  MUSCULOSKELETAL: No joint pain, no muscle pain  GENITOURINARY: no dysuria, no frequency, no hesitancy  PSYCHIATRY: no depression , no anxiety  ALL OTHER  ROS negative        Vital Signs Last 24 Hrs  T(C): 36.2 (21 Oct 2019 16:04), Max: 36.9 (20 Oct 2019 20:10)  T(F): 97.2 (21 Oct 2019 16:04), Max: 98.4 (20 Oct 2019 20:10)  HR: 53 (21 Oct 2019 16:04) (53 - 110)  BP: 144/79 (21 Oct 2019 16:04) (98/56 - 144/79)  BP(mean): --  RR: 20 (21 Oct 2019 16:04) (18 - 23)  SpO2: 98% (21 Oct 2019 16:04) (95% - 100%)    ________________________________________________  PHYSICAL EXAM:    GENERAL: NAD  HEENT: Normocephalic; conjunctivae and sclerae clear; moist mucous membranes;   NECK : supple, no JVD  CHEST/LUNG: Clear to auscultation bilaterally   HEART: S1 S2  regular  ABDOMEN: Soft, Nontender, Nondistended; Bowel sounds present  EXTREMITIES: no cyanosis; no LE edema; no calf tenderness  SKIN: warm and dry; no rash  NERVOUS SYSTEM:  Alert & Oriented x3; no new deficits    _________________________________________________  CURRENT MEDICATIONS:    MEDICATIONS  (STANDING):  aspirin enteric coated 81 milliGRAM(s) Oral daily  atorvastatin 40 milliGRAM(s) Oral at bedtime  enoxaparin Injectable 95 milliGRAM(s) SubCutaneous two times a day  insulin lispro (HumaLOG) corrective regimen sliding scale   SubCutaneous Before meals and at bedtime  isosorbide   mononitrate ER Tablet (IMDUR) 30 milliGRAM(s) Oral daily  lisinopril 40 milliGRAM(s) Oral daily  metoprolol tartrate 50 milliGRAM(s) Oral two times a day  pantoprazole    Tablet 40 milliGRAM(s) Oral before breakfast    MEDICATIONS  (PRN):      __________________________________________________  LABS:                          14.2   6.86  )-----------( 189      ( 21 Oct 2019 05:43 )             41.8     10-    136  |  105  |  20<H>  ----------------------------<  104<H>  3.9   |  27  |  0.92    Ca    8.3<L>      21 Oct 2019 05:43  Phos  3.7     10-  Mg     2.2     10-    TPro  6.7  /  Alb  3.1<L>  /  TBili  1.5<H>  /  DBili  x   /  AST  25  /  ALT  22  /  AlkPhos  66  10-    PT/INR - ( 21 Oct 2019 05:43 )   PT: 12.5 sec;   INR: 1.12 ratio         PTT - ( 20 Oct 2019 19:55 )  PTT:37.2 sec  Urinalysis Basic - ( 20 Oct 2019 19:55 )    Color: Yellow / Appearance: Slightly Turbid / S.015 / pH: x  Gluc: x / Ketone: Negative  / Bili: Negative / Urobili: Negative   Blood: x / Protein: 100 / Nitrite: Negative   Leuk Esterase: Negative / RBC: 2-5 /HPF / WBC 0-2 /HPF   Sq Epi: x / Non Sq Epi: Few /HPF / Bacteria: Few /HPF      CAPILLARY BLOOD GLUCOSE      POCT Blood Glucose.: 95 mg/dL (21 Oct 2019 16:58)  POCT Blood Glucose.: 230 mg/dL (21 Oct 2019 11:04)  POCT Blood Glucose.: 87 mg/dL (21 Oct 2019 08:32)      __________________________________________________  RADIOLOGY & ADDITIONAL TESTS:    Imaging Personally Reviewed:  YES      < from: Xray Chest 1 View-PORTABLE IMMEDIATE (10.20.19 @ 20:33) >  FINDINGS: Heart size cannot be quantitated on this portable evaluation.   No hilar or superior mediastinal abnormalities are identified.   Interstitial prominence is identified bilaterally, which may reflect   shallow inspiration; mild interstitial edema cannot be excluded. No   pleural effusion or pneumothorax is demonstrated. No mediastinal shift is   noted. The visualized osseous structures appear unremarkable.    IMPRESSION: Interstitial prominence is identified bilaterally, which may   reflect shallow inspiration; mild interstitial edema cannot be excluded.    < end of copied text >  Consultant(s) Notes Reviewed:   YES     Plan of care was discussed with patient; all questions and concerns were addressed and care was aligned with patient's wishes.

## 2019-10-22 NOTE — H&P CARDIOLOGY - HISTORY OF PRESENT ILLNESS
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 prior stents, Arrythmia (unsure if AFib, put on ASA), no implantable cardiac monitoring devices, T2DM - Aic 6.7 on 10/21- follow ed by PCP Dr Santiago   presents to [facility] ED on 10/20./19  with c/o chest pain and palpitations. Patient states that since that afternoon he has been experiencing sub sternal chest pain radiating to hunter arms  5/10 in intensity, burning in nature, non exertional, no aggravating or relieving factors identified. He said his pain is associated with palpitations and feels like his heart is pounding.  Also reports recent ARORA over last few days- which subsides when he slows down.  All other ROS negative. Patient has no history of melena, hematuria or any other bleeding. He went to cardiologist Dr Bolton at [facility]  3 weeks ago and had stress test done which was reportedly  normal. Upon admission to [facility] was found to be in A Fib - given lovenox + BB. + troponins ruling in for NSTEMI.   Transferred today for cath    Last lovenox 10/21 at 6pm

## 2019-10-22 NOTE — H&P CARDIOLOGY - PMH
DM (diabetes mellitus)    HTN (hypertension)    MI (myocardial infarction) DM (diabetes mellitus)    Former smoker    Gastritis    Hepatomegaly    HTN (hypertension)    MI (myocardial infarction)    MI (myocardial infarction)    PUD (peptic ulcer disease)  remote

## 2019-10-22 NOTE — DISCHARGE NOTE PROVIDER - PROVIDER TOKENS
FREE:[LAST:[Che],FIRST:[Jason],PHONE:[phone]],FAX:[(   )    -],ADDRESS:[[address], [place] [address]]]

## 2019-10-22 NOTE — PROGRESS NOTE ADULT - ASSESSMENT
78 yr old  English speaking male, from home, walks independently with PMHX significant for HTN, HLD, DM, Arrythmia (unsure if AFib, not on any AC), MI x2 no stents, last MI in 2000 presents to ED with c/o chest pain and palpitations and NSTEMI.  1.Tele monitoring.  2.Echocardiogram. Rec cardiac cath if pt and family agree.  3.Afib-lovenox,-on hold for possible cath today dec lopressor 25mg bid.  4.CAD-asa,b blocker,statin,Imdur.  5.HTN-Ace.  6.DM-Insulin.  7.PPI.

## 2019-10-22 NOTE — DISCHARGE NOTE NURSING/CASE MANAGEMENT/SOCIAL WORK - PATIENT PORTAL LINK FT
You can access the FollowMyHealth Patient Portal offered by [facility] by registering at the following website: [email or web address]. By joining [facility]’s FollowMyHealth portal, you will also be able to view your health information using other applications (apps) compatible with our system.

## 2019-10-22 NOTE — DISCHARGE NOTE PROVIDER - NSDCCPCAREPLAN_GEN_ALL_CORE_FT
PRINCIPAL DISCHARGE DIAGNOSIS  Diagnosis: Non-ST elevation MI (NSTEMI)  Assessment and Plan of Treatment: Patient was admitted for chest pain, had positive troponin, ekg did not show st elevation. Pt is being transfered to [facility] for further cardiac evaluation.

## 2019-10-22 NOTE — CHART NOTE - NSCHARTNOTEFT_GEN_A_CORE
Patient underwent a PCI procedure and is being admitted as they are at increased risk for major adverse cardiac and vascular events if discharged due to the following high risk characteristics:  NSTEMI

## 2019-10-22 NOTE — DISCHARGE NOTE PROVIDER - NSDCFUADDINST_GEN_ALL_CORE_FT
Continue your medications. Take Aspirin, Plavix, and Eliquis for one month, then stop aspirin and continue Plavix and Eliquis only.   No heavy lifting or pushing/pulling or strenuous activity with procedure arm for 2 weeks. No driving for 2 days. You may shower 24 hours following procedure but no soaking of your wrist in water (such as in a pool, sink, or tub) for 1 week. Check wrist site for bleeding and/or swelling daily following procedure. Call your doctor/cardiologist immediately for bleeding or swelling or if you have increased/persistent pain or drainage at the wrist site or if you have numbness, tingling or blue or white coloring of your hand or fingers.  Follow up with your cardiologist in 1- 2 weeks. You may call [facility] at [phone] (or [phone] after office hours and weekends) with any questions or concerns following your procedure. Continue your medications, but do not take Metformin until Fri. 10/25. Take Aspirin, Plavix, and Eliquis for one month, then stop aspirin and continue taking Plavix and Eliquis only.   No heavy lifting or pushing/pulling or strenuous activity with procedure arm for 2 weeks. No driving for 2 days. You may shower 24 hours following procedure but no soaking of your wrist in water (such as in a pool, sink, or tub) for 1 week. Check wrist site for bleeding and/or swelling daily following procedure. Call your doctor/cardiologist immediately for bleeding or swelling or if you have increased/persistent pain or drainage at the wrist site or if you have numbness, tingling or blue or white coloring of your hand or fingers.  Follow up with your cardiologist in 1- 2 weeks. You may call [facility] at [phone] (or [phone] after office hours and weekends) with any questions or concerns following your procedure.

## 2019-10-23 ENCOUNTER — TRANSCRIPTION ENCOUNTER (OUTPATIENT)
Age: 79
End: 2019-10-23

## 2019-10-23 VITALS
OXYGEN SATURATION: 97 % | TEMPERATURE: 98 F | RESPIRATION RATE: 17 BRPM | DIASTOLIC BLOOD PRESSURE: 66 MMHG | SYSTOLIC BLOOD PRESSURE: 136 MMHG | HEART RATE: 52 BPM

## 2019-10-23 DIAGNOSIS — E11.9 TYPE 2 DIABETES MELLITUS WITHOUT COMPLICATIONS: ICD-10-CM

## 2019-10-23 DIAGNOSIS — I21.4 NON-ST ELEVATION (NSTEMI) MYOCARDIAL INFARCTION: ICD-10-CM

## 2019-10-23 DIAGNOSIS — I10 ESSENTIAL (PRIMARY) HYPERTENSION: ICD-10-CM

## 2019-10-23 DIAGNOSIS — I48.91 UNSPECIFIED ATRIAL FIBRILLATION: ICD-10-CM

## 2019-10-23 DIAGNOSIS — E78.5 HYPERLIPIDEMIA, UNSPECIFIED: ICD-10-CM

## 2019-10-23 LAB
ANION GAP SERPL CALC-SCNC: 11 MMOL/L — SIGNIFICANT CHANGE UP (ref 5–17)
BUN SERPL-MCNC: 22 MG/DL — SIGNIFICANT CHANGE UP (ref 7–23)
CALCIUM SERPL-MCNC: 8.9 MG/DL — SIGNIFICANT CHANGE UP (ref 8.4–10.5)
CHLORIDE SERPL-SCNC: 104 MMOL/L — SIGNIFICANT CHANGE UP (ref 96–108)
CK MB BLD-MCNC: 3.9 % — HIGH (ref 0–3.5)
CK MB CFR SERPL CALC: 11.3 NG/ML — HIGH (ref 0–6.7)
CK SERPL-CCNC: 287 U/L — HIGH (ref 30–200)
CO2 SERPL-SCNC: 25 MMOL/L — SIGNIFICANT CHANGE UP (ref 22–31)
CREAT SERPL-MCNC: 0.97 MG/DL — SIGNIFICANT CHANGE UP (ref 0.5–1.3)
GLUCOSE BLDC GLUCOMTR-MCNC: 97 MG/DL — SIGNIFICANT CHANGE UP (ref 70–99)
GLUCOSE SERPL-MCNC: 110 MG/DL — HIGH (ref 70–99)
HCT VFR BLD CALC: 42.5 % — SIGNIFICANT CHANGE UP (ref 39–50)
HGB BLD-MCNC: 14.7 G/DL — SIGNIFICANT CHANGE UP (ref 13–17)
MCHC RBC-ENTMCNC: 29.1 PG — SIGNIFICANT CHANGE UP (ref 27–34)
MCHC RBC-ENTMCNC: 34.6 GM/DL — SIGNIFICANT CHANGE UP (ref 32–36)
MCV RBC AUTO: 84 FL — SIGNIFICANT CHANGE UP (ref 80–100)
NRBC # BLD: 0 /100 WBCS — SIGNIFICANT CHANGE UP (ref 0–0)
PLATELET # BLD AUTO: 179 K/UL — SIGNIFICANT CHANGE UP (ref 150–400)
POTASSIUM SERPL-MCNC: 4.5 MMOL/L — SIGNIFICANT CHANGE UP (ref 3.5–5.3)
POTASSIUM SERPL-SCNC: 4.5 MMOL/L — SIGNIFICANT CHANGE UP (ref 3.5–5.3)
RBC # BLD: 5.06 M/UL — SIGNIFICANT CHANGE UP (ref 4.2–5.8)
RBC # FLD: 13 % — SIGNIFICANT CHANGE UP (ref 10.3–14.5)
SODIUM SERPL-SCNC: 140 MMOL/L — SIGNIFICANT CHANGE UP (ref 135–145)
WBC # BLD: 6.84 K/UL — SIGNIFICANT CHANGE UP (ref 3.8–10.5)
WBC # FLD AUTO: 6.84 K/UL — SIGNIFICANT CHANGE UP (ref 3.8–10.5)

## 2019-10-23 PROCEDURE — C9606: CPT | Mod: LD

## 2019-10-23 PROCEDURE — C1887: CPT

## 2019-10-23 PROCEDURE — 82553 CREATINE MB FRACTION: CPT

## 2019-10-23 PROCEDURE — 99153 MOD SED SAME PHYS/QHP EA: CPT

## 2019-10-23 PROCEDURE — 93005 ELECTROCARDIOGRAM TRACING: CPT

## 2019-10-23 PROCEDURE — C1894: CPT

## 2019-10-23 PROCEDURE — C1725: CPT

## 2019-10-23 PROCEDURE — 99238 HOSP IP/OBS DSCHRG MGMT 30/<: CPT

## 2019-10-23 PROCEDURE — 82550 ASSAY OF CK (CPK): CPT

## 2019-10-23 PROCEDURE — C1769: CPT

## 2019-10-23 PROCEDURE — 82962 GLUCOSE BLOOD TEST: CPT

## 2019-10-23 PROCEDURE — C8929: CPT

## 2019-10-23 PROCEDURE — 93458 L HRT ARTERY/VENTRICLE ANGIO: CPT | Mod: 59

## 2019-10-23 PROCEDURE — 85027 COMPLETE CBC AUTOMATED: CPT

## 2019-10-23 PROCEDURE — 80048 BASIC METABOLIC PNL TOTAL CA: CPT

## 2019-10-23 PROCEDURE — C1874: CPT

## 2019-10-23 PROCEDURE — 99152 MOD SED SAME PHYS/QHP 5/>YRS: CPT

## 2019-10-23 RX ADMIN — PANTOPRAZOLE SODIUM 40 MILLIGRAM(S): 20 TABLET, DELAYED RELEASE ORAL at 05:27

## 2019-10-23 RX ADMIN — APIXABAN 5 MILLIGRAM(S): 2.5 TABLET, FILM COATED ORAL at 05:27

## 2019-10-23 RX ADMIN — RANOLAZINE 500 MILLIGRAM(S): 500 TABLET, FILM COATED, EXTENDED RELEASE ORAL at 05:26

## 2019-10-23 RX ADMIN — CLOPIDOGREL BISULFATE 75 MILLIGRAM(S): 75 TABLET, FILM COATED ORAL at 05:26

## 2019-10-23 RX ADMIN — Medication 81 MILLIGRAM(S): at 05:27

## 2019-10-23 RX ADMIN — LISINOPRIL 40 MILLIGRAM(S): 2.5 TABLET ORAL at 05:27

## 2019-10-23 NOTE — PROGRESS NOTE ADULT - PROBLEM SELECTOR PLAN 3
-continue imdur, lisinopril  -metoprolol tartrate 25 mg BID reduced to 12.5 mg BID due to patient HR consistently in 40s

## 2019-10-23 NOTE — PROGRESS NOTE ADULT - ASSESSMENT
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  Upon admission to [facility] was found to be in A Fib and ruled in for NSTEMI; transferred to [facility] for cath. Patient is now s/p LHC with NEYMAR x1 to pLAD via RRA.

## 2019-10-23 NOTE — PROGRESS NOTE ADULT - PROBLEM SELECTOR PLAN 1
-now s/p NEYMAR x1 to pLAD via RRA  -continue aspirin, plavix, statin; patient to stop aspirin after one month (due to triple therapy of aspirin, plavix, eliquis) and remain on plavix and eliquis  -Echo: EF 55-60%, grossly normal LVSF  -follow up with MD Bolton in 1-2 weeks  -AM labs reviewed and patient may be discharged home today -now s/p NEYMAR x1 to pLAD via RRA  -continue aspirin, plavix, statin; patient to stop aspirin after one month (due to triple therapy of aspirin, plavix, eliquis) and remain on plavix and eliquis  -Echo: EF 55-60%, grossly normal LVSF  -follow up with MD Bolton in 1-2 weeks  -cardiac enzymes yet to peak; /343, 6am labs pending  -if CK downtrends, patient may be discharged home today

## 2019-10-23 NOTE — PROGRESS NOTE ADULT - SUBJECTIVE AND OBJECTIVE BOX
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 Patient stated that since that afternoon he has been experiencing sub sternal chest pain radiating to hunter arms  5/10 in intensity, burning in nature, non exertional, no aggravating or relieving factors identified. He said his pain is associated with palpitations and feels like his heart is pounding.  Also reports recent ARORA over last few days- which subsides when he slows down.  All other ROS negative. He went to cardiologist Dr Bolton at [facility] 3 weeks ago and had stress test done which was reportedly normal. [facility] was found to be in A Fib - given lovenox + BB. + troponins ruled in for NSTEMI; transferred to [facility] for cath. Patient is now s/p LHC with NEYMAR x1 to pLAD via RRA.    SUBJECTIVE / OVERNIGHT EVENTS: no events; patient resting comfortably    REVIEW OF SYSTEMS:    CONSTITUTIONAL: No weakness, fevers or chills  EYES/ENT: No visual changes;  No vertigo or throat pain   NECK: No pain or stiffness  RESPIRATORY: No cough, wheezing, hemoptysis; No shortness of breath  CARDIOVASCULAR: No chest pain or palpitations  GASTROINTESTINAL: No abdominal or epigastric pain. No nausea, vomiting, or hematemesis; No diarrhea or constipation. No melena or hematochezia.  GENITOURINARY: No dysuria, frequency or hematuria  NEUROLOGICAL: No numbness or weakness  SKIN: No itching, rashes      MEDICATIONS  (STANDING):  apixaban 5 milliGRAM(s) Oral two times a day  aspirin enteric coated 81 milliGRAM(s) Oral daily  atorvastatin 40 milliGRAM(s) Oral at bedtime  clopidogrel Tablet 75 milliGRAM(s) Oral daily  dextrose 5%. 1000 milliLiter(s) (50 mL/Hr) IV Continuous <Continuous>  dextrose 50% Injectable 12.5 Gram(s) IV Push once  dextrose 50% Injectable 25 Gram(s) IV Push once  dextrose 50% Injectable 25 Gram(s) IV Push once  gabapentin 100 milliGRAM(s) Oral daily  insulin lispro (HumaLOG) corrective regimen sliding scale   SubCutaneous three times a day before meals  insulin lispro (HumaLOG) corrective regimen sliding scale   SubCutaneous at bedtime  isosorbide   mononitrate ER Tablet (IMDUR) 30 milliGRAM(s) Oral daily  lisinopril 40 milliGRAM(s) Oral daily  metoprolol tartrate 12.5 milliGRAM(s) Oral two times a day  pantoprazole    Tablet 40 milliGRAM(s) Oral before breakfast  ranolazine 500 milliGRAM(s) Oral two times a day    MEDICATIONS  (PRN):  dextrose 40% Gel 15 Gram(s) Oral once PRN Blood Glucose LESS THAN 70 milliGRAM(s)/deciliter  glucagon  Injectable 1 milliGRAM(s) IntraMuscular once PRN Glucose LESS THAN 70 milligrams/deciliter        CAPILLARY BLOOD GLUCOSE      POCT Blood Glucose.: 112 mg/dL (22 Oct 2019 20:54)  POCT Blood Glucose.: 136 mg/dL (22 Oct 2019 16:41)  POCT Blood Glucose.: 104 mg/dL (22 Oct 2019 11:09)  POCT Blood Glucose.: 108 mg/dL (22 Oct 2019 07:37)    I&O's Summary    22 Oct 2019 07:01  -  23 Oct 2019 02:46  --------------------------------------------------------  IN: 540 mL / OUT: 200 mL / NET: 340 mL        PHYSICAL EXAM:  GENERAL: NAD, well-developed  HEAD:  Atraumatic, Normocephalic  EYES: EOMI, PERRLA, conjunctiva and sclera clear  NECK: Supple, No JVD  CHEST/LUNG: Clear to auscultation bilaterally; No wheeze  HEART: Regular rate and rhythm; No murmurs, rubs, or gallops  ABDOMEN: Soft, Nontender, Nondistended; Bowel sounds present  EXTREMITIES:  2+ Peripheral Pulses, No clubbing, cyanosis, or edema  PSYCH: AAOx3  NEUROLOGY: non-focal  SKIN: No rashes or lesions; RRA cath site stable    LABS:                        14.3   6.92  )-----------( 195      ( 22 Oct 2019 06:20 )             43.1     10-22    136  |  105  |  22<H>  ----------------------------<  99  4.1   |  26  |  0.94    Ca    8.0<L>      22 Oct 2019 06:20  Phos  3.7     10-21  Mg     2.2     10-21    TPro  6.7  /  Alb  3.1<L>  /  TBili  1.5<H>  /  DBili  x   /  AST  25  /  ALT  22  /  AlkPhos  66  10-21    PT/INR - ( 21 Oct 2019 05:43 )   PT: 12.5 sec;   INR: 1.12 ratio           CARDIAC MARKERS ( 22 Oct 2019 19:03 )  x     / x     / 343 U/L / x     / 11.9 ng/mL  CARDIAC MARKERS ( 22 Oct 2019 06:20 )  2.740 ng/mL / x     / x     / x     / x      CARDIAC MARKERS ( 21 Oct 2019 20:06 )  2.490 ng/mL / x     / x     / x     / x      CARDIAC MARKERS ( 21 Oct 2019 12:46 )  2.020 ng/mL / x     / x     / x     / x      CARDIAC MARKERS ( 21 Oct 2019 05:44 )  1.830 ng/mL / x     / 300 U/L / x     / 18.4 ng/mL This is a 78 yr old Syrian speaking male with PMH of former smoker, HTN, HLD, MI x 2 (2000 + 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 Patient stated that since that afternoon he has been experiencing sub sternal chest pain radiating to hunter arms  5/10 in intensity, burning in nature, non exertional, no aggravating or relieving factors identified. He said his pain is associated with palpitations and feels like his heart is pounding.  Also reports recent ARORA over last few days- which subsides when he slows down.  All other ROS negative. He went to cardiologist Dr Bolton at [facility] 3 weeks ago and had stress test done which was reportedly normal. Upon admission to [facility] was found to be in A Fib - given lovenox + BB. + troponins ruled in for NSTEMI; transferred to [facility] for cath. Patient is now s/p LHC with NEYMAR x1 to pLAD via RRA.    SUBJECTIVE / OVERNIGHT EVENTS: no events; patient resting comfortably    REVIEW OF SYSTEMS:    CONSTITUTIONAL: No weakness, fevers or chills  EYES/ENT: No visual changes;  No vertigo or throat pain   NECK: No pain or stiffness  RESPIRATORY: No cough, wheezing, hemoptysis; No shortness of breath  CARDIOVASCULAR: No chest pain or palpitations  GASTROINTESTINAL: No abdominal or epigastric pain. No nausea, vomiting, or hematemesis; No diarrhea or constipation. No melena or hematochezia.  GENITOURINARY: No dysuria, frequency or hematuria  NEUROLOGICAL: No numbness or weakness  SKIN: No itching, rashes      MEDICATIONS  (STANDING):  apixaban 5 milliGRAM(s) Oral two times a day  aspirin enteric coated 81 milliGRAM(s) Oral daily  atorvastatin 40 milliGRAM(s) Oral at bedtime  clopidogrel Tablet 75 milliGRAM(s) Oral daily  dextrose 5%. 1000 milliLiter(s) (50 mL/Hr) IV Continuous <Continuous>  dextrose 50% Injectable 12.5 Gram(s) IV Push once  dextrose 50% Injectable 25 Gram(s) IV Push once  dextrose 50% Injectable 25 Gram(s) IV Push once  gabapentin 100 milliGRAM(s) Oral daily  insulin lispro (HumaLOG) corrective regimen sliding scale   SubCutaneous three times a day before meals  insulin lispro (HumaLOG) corrective regimen sliding scale   SubCutaneous at bedtime  isosorbide   mononitrate ER Tablet (IMDUR) 30 milliGRAM(s) Oral daily  lisinopril 40 milliGRAM(s) Oral daily  metoprolol tartrate 12.5 milliGRAM(s) Oral two times a day  pantoprazole    Tablet 40 milliGRAM(s) Oral before breakfast  ranolazine 500 milliGRAM(s) Oral two times a day    MEDICATIONS  (PRN):  dextrose 40% Gel 15 Gram(s) Oral once PRN Blood Glucose LESS THAN 70 milliGRAM(s)/deciliter  glucagon  Injectable 1 milliGRAM(s) IntraMuscular once PRN Glucose LESS THAN 70 milligrams/deciliter        CAPILLARY BLOOD GLUCOSE      POCT Blood Glucose.: 112 mg/dL (22 Oct 2019 20:54)  POCT Blood Glucose.: 136 mg/dL (22 Oct 2019 16:41)  POCT Blood Glucose.: 104 mg/dL (22 Oct 2019 11:09)  POCT Blood Glucose.: 108 mg/dL (22 Oct 2019 07:37)    I&O's Summary    22 Oct 2019 07:01  -  23 Oct 2019 02:46  --------------------------------------------------------  IN: 540 mL / OUT: 200 mL / NET: 340 mL        PHYSICAL EXAM:  GENERAL: NAD, well-developed  HEAD:  Atraumatic, Normocephalic  EYES: EOMI, PERRLA, conjunctiva and sclera clear  NECK: Supple, No JVD  CHEST/LUNG: Clear to auscultation bilaterally; No wheeze  HEART: irregular rate and irregular rhythm; No murmurs, rubs, or gallops  ABDOMEN: Soft, Nontender, Nondistended; Bowel sounds present  EXTREMITIES:  2+ Peripheral Pulses, No clubbing, cyanosis, or edema  PSYCH: AAOx3  NEUROLOGY: non-focal  SKIN: No rashes or lesions; RRA cath site stable    LABS:                        14.3   6.92  )-----------( 195      ( 22 Oct 2019 06:20 )             43.1     10-22    136  |  105  |  22<H>  ----------------------------<  99  4.1   |  26  |  0.94    Ca    8.0<L>      22 Oct 2019 06:20  Phos  3.7     10-21  Mg     2.2     10-21    TPro  6.7  /  Alb  3.1<L>  /  TBili  1.5<H>  /  DBili  x   /  AST  25  /  ALT  22  /  AlkPhos  66  10-21    PT/INR - ( 21 Oct 2019 05:43 )   PT: 12.5 sec;   INR: 1.12 ratio           CARDIAC MARKERS ( 22 Oct 2019 19:03 )  x     / x     / 343 U/L / x     / 11.9 ng/mL  CARDIAC MARKERS ( 22 Oct 2019 06:20 )  2.740 ng/mL / x     / x     / x     / x      CARDIAC MARKERS ( 21 Oct 2019 20:06 )  2.490 ng/mL / x     / x     / x     / x      CARDIAC MARKERS ( 21 Oct 2019 12:46 )  2.020 ng/mL / x     / x     / x     / x      CARDIAC MARKERS ( 21 Oct 2019 05:44 )  1.830 ng/mL / x     / 300 U/L / x     / 18.4 ng/mL

## 2019-11-12 PROCEDURE — 71045 X-RAY EXAM CHEST 1 VIEW: CPT

## 2019-11-12 PROCEDURE — 85610 PROTHROMBIN TIME: CPT

## 2019-11-12 PROCEDURE — 93005 ELECTROCARDIOGRAM TRACING: CPT

## 2019-11-12 PROCEDURE — 36415 COLL VENOUS BLD VENIPUNCTURE: CPT

## 2019-11-12 PROCEDURE — 96372 THER/PROPH/DIAG INJ SC/IM: CPT | Mod: XU

## 2019-11-12 PROCEDURE — 82962 GLUCOSE BLOOD TEST: CPT

## 2019-11-12 PROCEDURE — 81001 URINALYSIS AUTO W/SCOPE: CPT

## 2019-11-12 PROCEDURE — 84443 ASSAY THYROID STIM HORMONE: CPT

## 2019-11-12 PROCEDURE — 87086 URINE CULTURE/COLONY COUNT: CPT

## 2019-11-12 PROCEDURE — 85027 COMPLETE CBC AUTOMATED: CPT

## 2019-11-12 PROCEDURE — 84484 ASSAY OF TROPONIN QUANT: CPT

## 2019-11-12 PROCEDURE — 82607 VITAMIN B-12: CPT

## 2019-11-12 PROCEDURE — 82553 CREATINE MB FRACTION: CPT

## 2019-11-12 PROCEDURE — 83690 ASSAY OF LIPASE: CPT

## 2019-11-12 PROCEDURE — 85730 THROMBOPLASTIN TIME PARTIAL: CPT

## 2019-11-12 PROCEDURE — 82009 KETONE BODYS QUAL: CPT

## 2019-11-12 PROCEDURE — 84100 ASSAY OF PHOSPHORUS: CPT

## 2019-11-12 PROCEDURE — 80061 LIPID PANEL: CPT

## 2019-11-12 PROCEDURE — 80053 COMPREHEN METABOLIC PANEL: CPT

## 2019-11-12 PROCEDURE — 83880 ASSAY OF NATRIURETIC PEPTIDE: CPT

## 2019-11-12 PROCEDURE — 84436 ASSAY OF TOTAL THYROXINE: CPT

## 2019-11-12 PROCEDURE — 99291 CRITICAL CARE FIRST HOUR: CPT | Mod: 25

## 2019-11-12 PROCEDURE — 82272 OCCULT BLD FECES 1-3 TESTS: CPT

## 2019-11-12 PROCEDURE — 80048 BASIC METABOLIC PNL TOTAL CA: CPT

## 2019-11-12 PROCEDURE — 96375 TX/PRO/DX INJ NEW DRUG ADDON: CPT

## 2019-11-12 PROCEDURE — 83036 HEMOGLOBIN GLYCOSYLATED A1C: CPT

## 2019-11-12 PROCEDURE — 82550 ASSAY OF CK (CPK): CPT

## 2019-11-12 PROCEDURE — 83735 ASSAY OF MAGNESIUM: CPT

## 2020-05-07 ENCOUNTER — EMERGENCY (EMERGENCY)
Facility: HOSPITAL | Age: 80
LOS: 1 days | Discharge: LEFT BEFORE DISCHARGE | End: 2020-05-07
Attending: EMERGENCY MEDICINE
Payer: MEDICARE

## 2020-05-07 VITALS
OXYGEN SATURATION: 98 % | DIASTOLIC BLOOD PRESSURE: 72 MMHG | SYSTOLIC BLOOD PRESSURE: 149 MMHG | RESPIRATION RATE: 18 BRPM | TEMPERATURE: 98 F | HEART RATE: 63 BPM

## 2020-05-07 VITALS
RESPIRATION RATE: 18 BRPM | WEIGHT: 207.01 LBS | HEIGHT: 69 IN | OXYGEN SATURATION: 96 % | HEART RATE: 97 BPM | TEMPERATURE: 98 F | DIASTOLIC BLOOD PRESSURE: 59 MMHG | SYSTOLIC BLOOD PRESSURE: 98 MMHG

## 2020-05-07 PROBLEM — R16.0 HEPATOMEGALY, NOT ELSEWHERE CLASSIFIED: Chronic | Status: ACTIVE | Noted: 2019-10-22

## 2020-05-07 PROBLEM — K27.9 PEPTIC ULCER, SITE UNSPECIFIED, UNSPECIFIED AS ACUTE OR CHRONIC, WITHOUT HEMORRHAGE OR PERFORATION: Chronic | Status: ACTIVE | Noted: 2019-10-22

## 2020-05-07 PROBLEM — K29.70 GASTRITIS, UNSPECIFIED, WITHOUT BLEEDING: Chronic | Status: ACTIVE | Noted: 2019-10-22

## 2020-05-07 PROBLEM — Z87.891 PERSONAL HISTORY OF NICOTINE DEPENDENCE: Chronic | Status: ACTIVE | Noted: 2019-10-22

## 2020-05-07 PROBLEM — I21.9 ACUTE MYOCARDIAL INFARCTION, UNSPECIFIED: Chronic | Status: ACTIVE | Noted: 2019-10-22

## 2020-05-07 LAB
ALBUMIN SERPL ELPH-MCNC: 3.4 G/DL — LOW (ref 3.5–5)
ALP SERPL-CCNC: 75 U/L — SIGNIFICANT CHANGE UP (ref 40–120)
ALT FLD-CCNC: 18 U/L DA — SIGNIFICANT CHANGE UP (ref 10–60)
ANION GAP SERPL CALC-SCNC: 9 MMOL/L — SIGNIFICANT CHANGE UP (ref 5–17)
AST SERPL-CCNC: 15 U/L — SIGNIFICANT CHANGE UP (ref 10–40)
BASOPHILS # BLD AUTO: 0.03 K/UL — SIGNIFICANT CHANGE UP (ref 0–0.2)
BASOPHILS NFR BLD AUTO: 0.3 % — SIGNIFICANT CHANGE UP (ref 0–2)
BILIRUB SERPL-MCNC: 1 MG/DL — SIGNIFICANT CHANGE UP (ref 0.2–1.2)
BUN SERPL-MCNC: 23 MG/DL — HIGH (ref 7–18)
CALCIUM SERPL-MCNC: 8.1 MG/DL — LOW (ref 8.4–10.5)
CHLORIDE SERPL-SCNC: 110 MMOL/L — HIGH (ref 96–108)
CK MB BLD-MCNC: 1.6 % — SIGNIFICANT CHANGE UP (ref 0–3.5)
CK MB CFR SERPL CALC: 3 NG/ML — SIGNIFICANT CHANGE UP (ref 0–3.6)
CK SERPL-CCNC: 190 U/L — SIGNIFICANT CHANGE UP (ref 35–232)
CO2 SERPL-SCNC: 22 MMOL/L — SIGNIFICANT CHANGE UP (ref 22–31)
CREAT SERPL-MCNC: 1.26 MG/DL — SIGNIFICANT CHANGE UP (ref 0.5–1.3)
EOSINOPHIL # BLD AUTO: 0.18 K/UL — SIGNIFICANT CHANGE UP (ref 0–0.5)
EOSINOPHIL NFR BLD AUTO: 1.8 % — SIGNIFICANT CHANGE UP (ref 0–6)
GLUCOSE SERPL-MCNC: 151 MG/DL — HIGH (ref 70–99)
HCT VFR BLD CALC: 43.1 % — SIGNIFICANT CHANGE UP (ref 39–50)
HGB BLD-MCNC: 14.5 G/DL — SIGNIFICANT CHANGE UP (ref 13–17)
IMM GRANULOCYTES NFR BLD AUTO: 0.7 % — SIGNIFICANT CHANGE UP (ref 0–1.5)
LYMPHOCYTES # BLD AUTO: 2.06 K/UL — SIGNIFICANT CHANGE UP (ref 1–3.3)
LYMPHOCYTES # BLD AUTO: 20.2 % — SIGNIFICANT CHANGE UP (ref 13–44)
MCHC RBC-ENTMCNC: 28.7 PG — SIGNIFICANT CHANGE UP (ref 27–34)
MCHC RBC-ENTMCNC: 33.6 GM/DL — SIGNIFICANT CHANGE UP (ref 32–36)
MCV RBC AUTO: 85.3 FL — SIGNIFICANT CHANGE UP (ref 80–100)
MONOCYTES # BLD AUTO: 0.83 K/UL — SIGNIFICANT CHANGE UP (ref 0–0.9)
MONOCYTES NFR BLD AUTO: 8.2 % — SIGNIFICANT CHANGE UP (ref 2–14)
NEUTROPHILS # BLD AUTO: 7.01 K/UL — SIGNIFICANT CHANGE UP (ref 1.8–7.4)
NEUTROPHILS NFR BLD AUTO: 68.8 % — SIGNIFICANT CHANGE UP (ref 43–77)
NRBC # BLD: 0 /100 WBCS — SIGNIFICANT CHANGE UP (ref 0–0)
PLATELET # BLD AUTO: 228 K/UL — SIGNIFICANT CHANGE UP (ref 150–400)
POTASSIUM SERPL-MCNC: 4 MMOL/L — SIGNIFICANT CHANGE UP (ref 3.5–5.3)
POTASSIUM SERPL-SCNC: 4 MMOL/L — SIGNIFICANT CHANGE UP (ref 3.5–5.3)
PROT SERPL-MCNC: 7 G/DL — SIGNIFICANT CHANGE UP (ref 6–8.3)
RBC # BLD: 5.05 M/UL — SIGNIFICANT CHANGE UP (ref 4.2–5.8)
RBC # FLD: 13.2 % — SIGNIFICANT CHANGE UP (ref 10.3–14.5)
SARS-COV-2 RNA SPEC QL NAA+PROBE: SIGNIFICANT CHANGE UP
SODIUM SERPL-SCNC: 141 MMOL/L — SIGNIFICANT CHANGE UP (ref 135–145)
TROPONIN I SERPL-MCNC: <0.015 NG/ML — SIGNIFICANT CHANGE UP (ref 0–0.04)
WBC # BLD: 10.18 K/UL — SIGNIFICANT CHANGE UP (ref 3.8–10.5)
WBC # FLD AUTO: 10.18 K/UL — SIGNIFICANT CHANGE UP (ref 3.8–10.5)

## 2020-05-07 PROCEDURE — 36415 COLL VENOUS BLD VENIPUNCTURE: CPT

## 2020-05-07 PROCEDURE — 87635 SARS-COV-2 COVID-19 AMP PRB: CPT

## 2020-05-07 PROCEDURE — 80053 COMPREHEN METABOLIC PANEL: CPT

## 2020-05-07 PROCEDURE — 82550 ASSAY OF CK (CPK): CPT

## 2020-05-07 PROCEDURE — 93010 ELECTROCARDIOGRAM REPORT: CPT

## 2020-05-07 PROCEDURE — 99283 EMERGENCY DEPT VISIT LOW MDM: CPT

## 2020-05-07 PROCEDURE — 82553 CREATINE MB FRACTION: CPT

## 2020-05-07 PROCEDURE — 84484 ASSAY OF TROPONIN QUANT: CPT

## 2020-05-07 PROCEDURE — 71045 X-RAY EXAM CHEST 1 VIEW: CPT | Mod: 26

## 2020-05-07 PROCEDURE — 71045 X-RAY EXAM CHEST 1 VIEW: CPT

## 2020-05-07 PROCEDURE — 99284 EMERGENCY DEPT VISIT MOD MDM: CPT

## 2020-05-07 PROCEDURE — 85027 COMPLETE CBC AUTOMATED: CPT

## 2020-05-07 PROCEDURE — 93005 ELECTROCARDIOGRAM TRACING: CPT

## 2020-05-07 RX ORDER — SODIUM CHLORIDE 9 MG/ML
1000 INJECTION INTRAMUSCULAR; INTRAVENOUS; SUBCUTANEOUS ONCE
Refills: 0 | Status: COMPLETED | OUTPATIENT
Start: 2020-05-07 | End: 2020-05-07

## 2020-05-07 RX ORDER — LEVOFLOXACIN 5 MG/ML
1 INJECTION, SOLUTION INTRAVENOUS
Qty: 7 | Refills: 0
Start: 2020-05-07 | End: 2020-05-13

## 2020-05-07 RX ADMIN — SODIUM CHLORIDE 1000 MILLILITER(S): 9 INJECTION INTRAMUSCULAR; INTRAVENOUS; SUBCUTANEOUS at 14:43

## 2020-05-07 RX ADMIN — SODIUM CHLORIDE 1000 MILLILITER(S): 9 INJECTION INTRAMUSCULAR; INTRAVENOUS; SUBCUTANEOUS at 13:51

## 2020-05-07 NOTE — ED ADULT NURSE NOTE - OBJECTIVE STATEMENT
pt c/o weakness this morning w/ tightness in L ant chest w/ inspiration.  O2 high 90's on room air.  Breathing unlabored, speaking in full clear sentences.  SR on tele.

## 2020-05-07 NOTE — ED PROVIDER NOTE - OBJECTIVE STATEMENT
80 y/o male with, Citizen of [place] speaking,  #[id number] presents with hypotension and chest discomfort this morning.  PMHx of DM, HTN, A. fib, MI x2, CAD with stents and PVD.  Pt says last night he took his BP meds last night as usual without any issues.  When the patient woke up this morning he felt dizzy and felt burning to left side of chest associated with palpitations.  Pt took his BP and it was 80's systolic so he came in for evaluation.  Denies cough, fever, vomiting, or diarrhea.

## 2020-05-07 NOTE — ED PROVIDER NOTE - PMH
DM (diabetes mellitus)    Former smoker    Gastritis    Hepatomegaly    HTN (hypertension)    MI (myocardial infarction)    MI (myocardial infarction)    PUD (peptic ulcer disease)  remote

## 2020-05-07 NOTE — ED PROVIDER NOTE - CLINICAL SUMMARY MEDICAL DECISION MAKING FREE TEXT BOX
80 y/o male with PMHx of MI x2, CAD with stents, DM, HTN, PVD presents with chest discomfort, palpitations, and hypotension.  Fluids started.  Will check labs, EKG, CXR, and reassess.

## 2020-05-07 NOTE — ED PROVIDER NOTE - PROGRESS NOTE DETAILS
#[id number] CXR shows CXR shows pneumonia.  Repeat  systolic.  Admission recommended however patient declines and prefers to go home.  Risks of leaving AMA risks explained to patient.  Also spoke with patients PMD, Dr. Trujillo, and made him aware that patient is leaving AMA and he will follow up with him this week.

## 2020-05-07 NOTE — ED PROVIDER NOTE - PATIENT PORTAL LINK FT
You can access the FollowMyHealth Patient Portal offered by [facility] by registering at the following website: [email or web address]. By joining [facility]’s FollowMyHealth portal, you will also be able to view your health information using other applications (apps) compatible with our system.

## 2020-05-07 NOTE — ED PROVIDER NOTE - NSCAREINITIATED _GEN_ER
Quality 110: Preventive Care And Screening: Influenza Immunization: Influenza immunization was not ordered or administered, reason not given Detail Level: Detailed Quality 111:Pneumonia Vaccination Status For Older Adults: Pneumococcal Vaccination Previously Received Quality 265: Biopsy Follow-Up: Biopsy results reviewed, communicated, tracked, and documented Quality 130: Documentation Of Current Medications In The Medical Record: Current Medications Documented Shady Yang(Attending)

## 2021-01-21 NOTE — PATIENT PROFILE ADULT - NSPROREFERSVCHOMECARDIO_GEN_A_NUR
Call Center TCM Note      Responses   [facility] patient discharged from?  [place]   Does the patient have one of the following disease processes/diagnoses(primary or secondary)?  Total Joint Replacement   Joint surgery performed?  Knee   TCM attempt successful?  No   Unsuccessful attempts  Attempt 2          Philly Belcher MA    1/21/2021, 16:33 EST       no

## 2021-08-25 NOTE — PATIENT PROFILE ADULT - FUNCTIONAL SCREEN CURRENT LEVEL: COMMUNICATION, MLM
[facility] was contacted with low risk results for [facility] non-invasive prenatal testing (NIPT). The results indicated a less than 1 in 10,000 risk for Down syndrome, Trisomy 18, Trisomy 13, and Landaverde syndrome, as well as a low risk for triploidy. In addition, predicted male fetal sex was revealed.     I reminded the patient that this is a screening test, meaning it does not completely rule out the chance that the baby could have one of these conditions or additional conditions which were not included on the screen.    Results will be uploaded to the patient's labs under Kit Collection. Patient was encouraged to call back with any questions.    Maira Lofton MS, [facility]  Genetic Counselor   0 = understands/communicates without difficulty

## 2022-04-20 NOTE — ED ADULT NURSE NOTE - CAS TRG GENERAL NORM CIRC DET
Dental Abscess   AMBULATORY CARE:   A dental abscess  is a collection of pus in or around a tooth  A dental abscess is caused by bacteria  The bacteria can enter the tooth when the enamel (outer part of the tooth) is damaged by tooth decay  Bacteria can also enter the tooth through a chip in the tooth or a cut in the gum  Food particles that are stuck between the teeth for a long time may also lead to an abscess  Common signs and symptoms:   · Toothache, a loose tooth, or a tooth that is very sensitive to pressure or temperature    · Bad breath, unpleasant taste, and drooling    · Fever    · Pain, redness, and swelling of the gums, or swelling of your face and neck    · Pain when you open or close your mouth    · Trouble opening your mouth    Seek care immediately if:   · You have severe pain in your tooth or jaw  · You have trouble breathing because of pain or swelling  Call your doctor if:   · Your symptoms get worse, even after treatment  · Your mouth is bleeding  · You cannot eat or drink because of pain or swelling  · Your abscess returns  · You have an injury that causes a crack in your tooth  · You have questions or concerns about your condition or care  Treatment:  You may  need any of the following:  · Medicines  may be given to treat a bacterial infection and decrease pain  · Incision and drainage  is a cut in the abscess to allow the pus to drain  A sample of fluid may be collected from your abscess  The fluid is sent to a lab and tested for bacteria  Ask your healthcare provider for more information  · A root canal  is a procedure to remove the bacteria and prevent more infection  It is usually done after an incision and drainage  A filling or crown will be placed over the tooth after you have healed from your root canal      · Tooth removal  may be needed if the infection affects deeper tissues  This is usually done after an incision and drainage      Self-care: · Rinse your mouth every 2 hours with salt water  This will help keep the area clean  · Gently brush your teeth twice a day with a soft tooth brush  This will help keep the area clean  · Eat soft foods as directed  Soft foods may cause less pain  Examples include applesauce, yogurt, and cooked pasta  Ask your healthcare provider how long to follow this instruction  · Apply a warm compress to your tooth or gum  Use a cotton ball or gauze soaked in warm water  Remove the compress in 10 minutes or when it becomes cool  Repeat 3 times a day  Prevent another abscess:   · Brush your teeth at least 2 times a day  with fluoride toothpaste  · Use dental floss at least once a day  to clean between your teeth  · Rinse your mouth with water or mouthwash  after meals and snacks  Chew sugarless gum  · Avoid sugary and starchy food that can stick between your teeth  Limit drinks high in sugar, such as soda or fruit juice  · See your dentist every 6 months  for dental cleanings and oral exams  Follow up with your doctor or dentist as directed: Your healthcare provider will need to check your teeth and gums  Write down your questions so you remember to ask them during your visits  © Copyright [employer] 2022 Information is for End User's use only and may not be sold, redistributed or otherwise used for commercial purposes  All illustrations and images included in CareNotes® are the copyrighted property of [employer]  or [employer]  The above information is an  only  It is not intended as medical advice for individual conditions or treatments  Talk to your doctor, nurse or pharmacist before following any medical regimen to see if it is safe and effective for you  Strong peripheral pulses

## 2022-09-16 ENCOUNTER — EMERGENCY (EMERGENCY)
Facility: HOSPITAL | Age: 82
LOS: 1 days | Discharge: HOME | End: 2022-09-16
Attending: STUDENT IN AN ORGANIZED HEALTH CARE EDUCATION/TRAINING PROGRAM
Payer: MEDICARE

## 2022-09-16 VITALS
TEMPERATURE: 98 F | OXYGEN SATURATION: 98 % | HEART RATE: 48 BPM | DIASTOLIC BLOOD PRESSURE: 62 MMHG | WEIGHT: 197.98 LBS | RESPIRATION RATE: 16 BRPM | SYSTOLIC BLOOD PRESSURE: 104 MMHG

## 2022-09-16 LAB
ALBUMIN SERPL ELPH-MCNC: 3.1 G/DL — LOW (ref 3.5–5)
ALP SERPL-CCNC: 70 U/L — SIGNIFICANT CHANGE UP (ref 40–120)
ALT FLD-CCNC: 19 U/L DA — SIGNIFICANT CHANGE UP (ref 10–60)
ANION GAP SERPL CALC-SCNC: 9 MMOL/L — SIGNIFICANT CHANGE UP (ref 5–17)
APTT BLD: 33.2 SEC — SIGNIFICANT CHANGE UP (ref 27.5–35.5)
AST SERPL-CCNC: 19 U/L — SIGNIFICANT CHANGE UP (ref 10–40)
BASOPHILS # BLD AUTO: 0.05 K/UL — SIGNIFICANT CHANGE UP (ref 0–0.2)
BASOPHILS NFR BLD AUTO: 0.5 % — SIGNIFICANT CHANGE UP (ref 0–2)
BILIRUB SERPL-MCNC: 1 MG/DL — SIGNIFICANT CHANGE UP (ref 0.2–1.2)
BUN SERPL-MCNC: 20 MG/DL — HIGH (ref 7–18)
CALCIUM SERPL-MCNC: 7.7 MG/DL — LOW (ref 8.4–10.5)
CHLORIDE SERPL-SCNC: 107 MMOL/L — SIGNIFICANT CHANGE UP (ref 96–108)
CO2 SERPL-SCNC: 23 MMOL/L — SIGNIFICANT CHANGE UP (ref 22–31)
CREAT SERPL-MCNC: 1.02 MG/DL — SIGNIFICANT CHANGE UP (ref 0.5–1.3)
EGFR: 73 ML/MIN/1.73M2 — SIGNIFICANT CHANGE UP
EOSINOPHIL # BLD AUTO: 0.19 K/UL — SIGNIFICANT CHANGE UP (ref 0–0.5)
EOSINOPHIL NFR BLD AUTO: 2 % — SIGNIFICANT CHANGE UP (ref 0–6)
GLUCOSE SERPL-MCNC: 160 MG/DL — HIGH (ref 70–99)
HCT VFR BLD CALC: 39.8 % — SIGNIFICANT CHANGE UP (ref 39–50)
HGB BLD-MCNC: 13.3 G/DL — SIGNIFICANT CHANGE UP (ref 13–17)
IMM GRANULOCYTES NFR BLD AUTO: 0.4 % — SIGNIFICANT CHANGE UP (ref 0–0.9)
INR BLD: 1.32 RATIO — HIGH (ref 0.88–1.16)
LYMPHOCYTES # BLD AUTO: 1.53 K/UL — SIGNIFICANT CHANGE UP (ref 1–3.3)
LYMPHOCYTES # BLD AUTO: 15.8 % — SIGNIFICANT CHANGE UP (ref 13–44)
MCHC RBC-ENTMCNC: 28.8 PG — SIGNIFICANT CHANGE UP (ref 27–34)
MCHC RBC-ENTMCNC: 33.4 GM/DL — SIGNIFICANT CHANGE UP (ref 32–36)
MCV RBC AUTO: 86.1 FL — SIGNIFICANT CHANGE UP (ref 80–100)
MONOCYTES # BLD AUTO: 0.63 K/UL — SIGNIFICANT CHANGE UP (ref 0–0.9)
MONOCYTES NFR BLD AUTO: 6.5 % — SIGNIFICANT CHANGE UP (ref 2–14)
NEUTROPHILS # BLD AUTO: 7.23 K/UL — SIGNIFICANT CHANGE UP (ref 1.8–7.4)
NEUTROPHILS NFR BLD AUTO: 74.8 % — SIGNIFICANT CHANGE UP (ref 43–77)
NRBC # BLD: 0 /100 WBCS — SIGNIFICANT CHANGE UP (ref 0–0)
PLATELET # BLD AUTO: 210 K/UL — SIGNIFICANT CHANGE UP (ref 150–400)
POTASSIUM SERPL-MCNC: 4.7 MMOL/L — SIGNIFICANT CHANGE UP (ref 3.5–5.3)
POTASSIUM SERPL-SCNC: 4.7 MMOL/L — SIGNIFICANT CHANGE UP (ref 3.5–5.3)
PROT SERPL-MCNC: 6.5 G/DL — SIGNIFICANT CHANGE UP (ref 6–8.3)
PROTHROM AB SERPL-ACNC: 15.7 SEC — HIGH (ref 10.5–13.4)
RBC # BLD: 4.62 M/UL — SIGNIFICANT CHANGE UP (ref 4.2–5.8)
RBC # FLD: 12.7 % — SIGNIFICANT CHANGE UP (ref 10.3–14.5)
SODIUM SERPL-SCNC: 139 MMOL/L — SIGNIFICANT CHANGE UP (ref 135–145)
TROPONIN I, HIGH SENSITIVITY RESULT: 6.9 NG/L — SIGNIFICANT CHANGE UP
TSH SERPL-MCNC: 2.24 UU/ML — SIGNIFICANT CHANGE UP (ref 0.34–4.82)
WBC # BLD: 9.67 K/UL — SIGNIFICANT CHANGE UP (ref 3.8–10.5)
WBC # FLD AUTO: 9.67 K/UL — SIGNIFICANT CHANGE UP (ref 3.8–10.5)

## 2022-09-16 PROCEDURE — 99285 EMERGENCY DEPT VISIT HI MDM: CPT | Mod: GC

## 2022-09-16 PROCEDURE — 93010 ELECTROCARDIOGRAM REPORT: CPT

## 2022-09-16 RX ORDER — SODIUM CHLORIDE 9 MG/ML
1000 INJECTION INTRAMUSCULAR; INTRAVENOUS; SUBCUTANEOUS ONCE
Refills: 0 | Status: DISCONTINUED | OUTPATIENT
Start: 2022-09-16 | End: 2022-09-16

## 2022-09-16 NOTE — ED PROVIDER NOTE - NSFOLLOWUPINSTRUCTIONS_ED_ALL_ED_FT
Near Syncope    WHAT YOU NEED TO KNOW:    Near syncope, also called presyncope, is the feeling that you may faint (lose consciousness), but you do not. You can control some health conditions that cause near syncope. Your healthcare providers can help you create a plan to manage near syncope and prevent episodes.    DISCHARGE INSTRUCTIONS:    Seek care immediately if:   •You have sudden chest pain.       •You have trouble breathing or shortness of breath.       •You have vision changes, are sweating, and have nausea while you are sitting or lying down.       •You feel dizzy or flushed and your heart is fluttering.      •You lose consciousness.      •You cannot use your arm, hand, foot, or leg, or it feels weak.      •You have trouble speaking or understanding others when they speak.      Contact your healthcare provider if:   •You have new or worsening symptoms.      •Your heart beats faster or slower than usual.       •You have questions or concerns about your condition or care.      Medicines:   •Medicines may be needed to help your heart pump strongly and regularly. Your healthcare provider may also make changes to any medicines that are causing syncope.       •Take your medicine as directed. Contact your healthcare provider if you think your medicine is not helping or if you have side effects. Tell your provider if you are allergic to any medicine. Keep a list of the medicines, vitamins, and herbs you take. Include the amounts, and when and why you take them. Bring the list or the pill bottles to follow-up visits. Carry your medicine list with you in case of an emergency.      Follow up with your healthcare provider as directed: You may need more tests to help find the cause of your near syncope episodes. The tests will help healthcare providers plan the best treatment for you. Write down your questions so you remember to ask them during your visits.     Manage near syncope:   •Sit or lie down when needed. This includes when you feel dizzy, your throat is getting tight, and your vision changes. Raise your legs above the level of your heart.      •Take slow, deep breaths if you start to breathe faster with anxiety or fear. This can help decrease dizziness and the feeling that you might faint.       •Keep a record of your near syncope episodes. Include your symptoms and your activity before and after the episode. The record can help your healthcare provider find the cause of your near syncope and help you manage episodes.      Prevent a near syncope episode:   •Move slowly and let yourself get used to one position before you move to another position. This is very important when you change from a lying or sitting position to a standing position. Take some deep breaths before you stand up from a lying position. Stand up slowly. Sudden movements may cause a fainting spell. Sit on the side of the bed or couch for a few minutes before you stand up. If you are on bedrest, try to be upright for about 2 hours each day, or as directed. Do not lock your legs if you are standing for a long period of time. Move your legs and bend your knees to keep blood flowing.      •Follow your healthcare provider's recommendations. Your provider may recommend that you drink more liquids to prevent dehydration. You may also need to have more salt to keep your blood pressure from dropping too low and causing syncope. Your provider will tell you how much liquid and sodium to have each day. He or she will also tell you how much physical activity is safe for you. This will depend on what is causing your near syncope.      •Watch for signs of low blood sugar. These include hunger, nervousness, sweating, and fast or fluttery heartbeats. Talk with your healthcare provider about ways to keep your blood sugar level steady.      •Check your blood pressure often. This is important if you take medicine to lower your blood pressure. Check your blood pressure when you are lying down and when you are standing. Ask how often to check during the day. Keep a record of your blood pressure numbers. Your healthcare provider may use the record to help plan your treatment.  How to take a Blood Pressure           •Do not strain if you are constipated. You may faint if you strain to have a bowel movement. Walking is the best way to get your bowels moving. Eat foods high in fiber to make it easier to have a bowel movement. Good examples are high-fiber cereals, beans, vegetables, and whole-grain breads. Prune juice may help make bowel movements softer.      •Do not exercise outside on a hot day. The combination of physical activity and heat can lead to dehydration. This can cause syncope.

## 2022-09-16 NOTE — ED PROVIDER NOTE - ATTENDING CONTRIBUTION TO CARE
Patient presenting with lightheaded, neuro intact. will obtain lab, monitor vital.  ed obs and reassess. clinically stable.

## 2022-09-16 NOTE — ED PROVIDER NOTE - OBJECTIVE STATEMENT
Pt is a 81 y.o. M w/ a PMHx of HTN, DM, CAD, Past MI s/p stents x 3 on Eliquis, presents to the ED today with dizziness and near syncope at home. Pt is [place] speaking with his son at the bedside. Pt states that he was using the bathroom in the morning and as he was coming out he felt very dizzy and decided to sit down thinking he was going to fall. Pt denies any LOC or falling. His wife then called EMS and he was hypotensive. Otherwise pt denies any chest pain, dyspnea, fever, chills, headache, visual changes, n/v/d.

## 2022-09-16 NOTE — ED ADULT NURSE NOTE - OBJECTIVE STATEMENT
Patient came to the ED BIBA accompanied by family at bedside for dizziness and bradycardia x today. pt family stated the patient took blood pressure medications yesterday that might have an effect on his heart rate.

## 2022-09-16 NOTE — ED PROVIDER NOTE - PHYSICAL EXAMINATION
VITALS:     GENERAL: NAD, lying in bed comfortably  HEAD:  Atraumatic, Normocephalic  EYES: EOMI, PERRLA, conjunctiva and sclera clear  ENT: Moist mucous membranes  NECK: Supple, No JVD  CHEST/LUNG: Clear to auscultation bilaterally; No rales, rhonchi, wheezing, or rubs. Unlabored respirations  HEART: Regular rate and rhythm; No murmurs, rubs, or gallops  ABDOMEN: Bowel sounds present; Soft, Nontender, Nondistended. No hepatomegaly  EXTREMITIES:  2+ Peripheral Pulses, brisk capillary refill. No clubbing, cyanosis, or edema  NERVOUS SYSTEM:  Alert & Oriented X3 [place] speaking, speech clear. No deficits   MSK: FROM all 4 extremities, full and equal strength  SKIN: No rashes or lesions

## 2022-09-16 NOTE — ED PROVIDER NOTE - CLINICAL SUMMARY MEDICAL DECISION MAKING FREE TEXT BOX
Pt is a 81 y.o. M w/ a PMHx of HTN, DM, CAD, Past MI s/p stents x 3 on Eliquis, presents to the ED today with dizziness and near syncope at home. Will do labs to r/o any electrolyte imbalances, EKG showed sinus conner, orthostatics, Pt currently asymptomatic in the ED just states he feels weak. Will continue to monitor and reassess.

## 2022-09-16 NOTE — ED PROVIDER NOTE - PATIENT PORTAL LINK FT
You can access the FollowMyHealth Patient Portal offered by [facility] by registering at the following website: [email or web address]. By joining [employer]’s FollowMyHealth portal, you will also be able to view your health information using other applications (apps) compatible with our system.

## 2022-09-16 NOTE — ED PROVIDER NOTE - PROGRESS NOTE DETAILS
patient lab wnl. neuro intact. well appearing. given med, return precautions and instructed to f.u pmd. patient state he feels wells and feels comfortable return home.

## 2022-12-14 ENCOUNTER — INPATIENT (INPATIENT)
Facility: HOSPITAL | Age: 82
LOS: 0 days | Discharge: HOME | DRG: 310 | End: 2022-12-15
Attending: STUDENT IN AN ORGANIZED HEALTH CARE EDUCATION/TRAINING PROGRAM | Admitting: STUDENT IN AN ORGANIZED HEALTH CARE EDUCATION/TRAINING PROGRAM
Payer: COMMERCIAL

## 2022-12-14 VITALS
HEART RATE: 47 BPM | TEMPERATURE: 98 F | OXYGEN SATURATION: 96 % | HEIGHT: 70 IN | SYSTOLIC BLOOD PRESSURE: 115 MMHG | RESPIRATION RATE: 16 BRPM | WEIGHT: 199.96 LBS | DIASTOLIC BLOOD PRESSURE: 56 MMHG

## 2022-12-14 DIAGNOSIS — I48.91 UNSPECIFIED ATRIAL FIBRILLATION: ICD-10-CM

## 2022-12-14 DIAGNOSIS — R00.1 BRADYCARDIA, UNSPECIFIED: ICD-10-CM

## 2022-12-14 DIAGNOSIS — R17 UNSPECIFIED JAUNDICE: ICD-10-CM

## 2022-12-14 DIAGNOSIS — E11.9 TYPE 2 DIABETES MELLITUS WITHOUT COMPLICATIONS: ICD-10-CM

## 2022-12-14 DIAGNOSIS — Z29.9 ENCOUNTER FOR PROPHYLACTIC MEASURES, UNSPECIFIED: ICD-10-CM

## 2022-12-14 LAB
ALBUMIN SERPL ELPH-MCNC: 3.2 G/DL — LOW (ref 3.5–5)
ALP SERPL-CCNC: 71 U/L — SIGNIFICANT CHANGE UP (ref 40–120)
ALT FLD-CCNC: 20 U/L DA — SIGNIFICANT CHANGE UP (ref 10–60)
ANION GAP SERPL CALC-SCNC: 7 MMOL/L — SIGNIFICANT CHANGE UP (ref 5–17)
AST SERPL-CCNC: 13 U/L — SIGNIFICANT CHANGE UP (ref 10–40)
BASOPHILS # BLD AUTO: 0.03 K/UL — SIGNIFICANT CHANGE UP (ref 0–0.2)
BASOPHILS NFR BLD AUTO: 0.4 % — SIGNIFICANT CHANGE UP (ref 0–2)
BILIRUB SERPL-MCNC: 1.6 MG/DL — HIGH (ref 0.2–1.2)
BUN SERPL-MCNC: 19 MG/DL — HIGH (ref 7–18)
CALCIUM SERPL-MCNC: 8.2 MG/DL — LOW (ref 8.4–10.5)
CHLORIDE SERPL-SCNC: 106 MMOL/L — SIGNIFICANT CHANGE UP (ref 96–108)
CO2 SERPL-SCNC: 28 MMOL/L — SIGNIFICANT CHANGE UP (ref 22–31)
CREAT SERPL-MCNC: 0.99 MG/DL — SIGNIFICANT CHANGE UP (ref 0.5–1.3)
EGFR: 76 ML/MIN/1.73M2 — SIGNIFICANT CHANGE UP
EOSINOPHIL # BLD AUTO: 0.24 K/UL — SIGNIFICANT CHANGE UP (ref 0–0.5)
EOSINOPHIL NFR BLD AUTO: 2.9 % — SIGNIFICANT CHANGE UP (ref 0–6)
FLUAV AG NPH QL: SIGNIFICANT CHANGE UP
FLUBV AG NPH QL: SIGNIFICANT CHANGE UP
GLUCOSE SERPL-MCNC: 131 MG/DL — HIGH (ref 70–99)
HCT VFR BLD CALC: 41.6 % — SIGNIFICANT CHANGE UP (ref 39–50)
HGB BLD-MCNC: 13.7 G/DL — SIGNIFICANT CHANGE UP (ref 13–17)
IMM GRANULOCYTES NFR BLD AUTO: 0.2 % — SIGNIFICANT CHANGE UP (ref 0–0.9)
LYMPHOCYTES # BLD AUTO: 1.72 K/UL — SIGNIFICANT CHANGE UP (ref 1–3.3)
LYMPHOCYTES # BLD AUTO: 20.7 % — SIGNIFICANT CHANGE UP (ref 13–44)
MCHC RBC-ENTMCNC: 28.4 PG — SIGNIFICANT CHANGE UP (ref 27–34)
MCHC RBC-ENTMCNC: 32.9 GM/DL — SIGNIFICANT CHANGE UP (ref 32–36)
MCV RBC AUTO: 86.3 FL — SIGNIFICANT CHANGE UP (ref 80–100)
MONOCYTES # BLD AUTO: 0.6 K/UL — SIGNIFICANT CHANGE UP (ref 0–0.9)
MONOCYTES NFR BLD AUTO: 7.2 % — SIGNIFICANT CHANGE UP (ref 2–14)
NEUTROPHILS # BLD AUTO: 5.71 K/UL — SIGNIFICANT CHANGE UP (ref 1.8–7.4)
NEUTROPHILS NFR BLD AUTO: 68.6 % — SIGNIFICANT CHANGE UP (ref 43–77)
NRBC # BLD: 0 /100 WBCS — SIGNIFICANT CHANGE UP (ref 0–0)
PLATELET # BLD AUTO: 205 K/UL — SIGNIFICANT CHANGE UP (ref 150–400)
POTASSIUM SERPL-MCNC: 4.4 MMOL/L — SIGNIFICANT CHANGE UP (ref 3.5–5.3)
POTASSIUM SERPL-SCNC: 4.4 MMOL/L — SIGNIFICANT CHANGE UP (ref 3.5–5.3)
PROT SERPL-MCNC: 6.6 G/DL — SIGNIFICANT CHANGE UP (ref 6–8.3)
RBC # BLD: 4.82 M/UL — SIGNIFICANT CHANGE UP (ref 4.2–5.8)
RBC # FLD: 13.2 % — SIGNIFICANT CHANGE UP (ref 10.3–14.5)
SARS-COV-2 RNA SPEC QL NAA+PROBE: SIGNIFICANT CHANGE UP
SODIUM SERPL-SCNC: 141 MMOL/L — SIGNIFICANT CHANGE UP (ref 135–145)
TROPONIN I, HIGH SENSITIVITY RESULT: 9.5 NG/L — SIGNIFICANT CHANGE UP
WBC # BLD: 8.32 K/UL — SIGNIFICANT CHANGE UP (ref 3.8–10.5)
WBC # FLD AUTO: 8.32 K/UL — SIGNIFICANT CHANGE UP (ref 3.8–10.5)

## 2022-12-14 PROCEDURE — 99285 EMERGENCY DEPT VISIT HI MDM: CPT

## 2022-12-14 PROCEDURE — 99223 1ST HOSP IP/OBS HIGH 75: CPT | Mod: GC,AI

## 2022-12-14 RX ORDER — ENOXAPARIN SODIUM 100 MG/ML
90 INJECTION SUBCUTANEOUS EVERY 12 HOURS
Refills: 0 | Status: DISCONTINUED | OUTPATIENT
Start: 2022-12-14 | End: 2022-12-15

## 2022-12-14 RX ORDER — SODIUM CHLORIDE 9 MG/ML
1000 INJECTION INTRAMUSCULAR; INTRAVENOUS; SUBCUTANEOUS
Refills: 0 | Status: DISCONTINUED | OUTPATIENT
Start: 2022-12-14 | End: 2022-12-15

## 2022-12-14 RX ADMIN — SODIUM CHLORIDE 70 MILLILITER(S): 9 INJECTION INTRAMUSCULAR; INTRAVENOUS; SUBCUTANEOUS at 22:57

## 2022-12-14 RX ADMIN — ENOXAPARIN SODIUM 90 MILLIGRAM(S): 100 INJECTION SUBCUTANEOUS at 22:53

## 2022-12-14 NOTE — H&P ADULT - PROBLEM SELECTOR PLAN 2
pt takes eliquis and metoprolol at home  Will start FD lovenox, hold metoprolol pt takes Eliquis and metoprolol at home  Will start FD Lovenox, hold metoprolol

## 2022-12-14 NOTE — ED PROVIDER NOTE - OBJECTIVE STATEMENT
82-year-old male with past medical history as below sent from outpatient cardiology testing site for bradycardia and hypotension.  Patient reporting that he was going there for routine stress testing today and when they checked his vital signs and EKG he was sent to the emergency department.  He denies chest pain he denies shortness of breath he denies feeling lightheaded or dizzy.  He reports that otherwise he feels in his usual state of health.  He reports compliance with his medications.

## 2022-12-14 NOTE — ED PROVIDER NOTE - PHYSICAL EXAMINATION
Exam:  General: Patient well appearing, vital signs within normal limits  HEENT: airway patent with moist mucous membranes  Cardiac: regular bradycardia S1/S2 with strong peripheral pulses  Respiratory: lungs clear without respiratory distress  GI: abdomen soft, non tender, non distended  Neuro: no gross neurologic deficits  Skin: warm, well perfused  Psych: normal mood and affect

## 2022-12-14 NOTE — ED ADULT NURSE NOTE - ED STAT RN HANDOFF DETAILS
patient received at 7AM, denies pain/discomfort, no dizziness. vitals stable as documented, sinus conner on cardiac monitor, HR 46. IV intact no redness no swelling noted. patient has bed assigned on 5N, floor contacted, pending report.

## 2022-12-14 NOTE — H&P ADULT - PROBLEM SELECTOR PLAN 1
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 Haines Consulted

## 2022-12-14 NOTE — H&P ADULT - ASSESSMENT
82 year old male with PMH of HTN, DM, CAD, Afib (Eliquis) presents with bradycardia. Admitted for further evaluation  82 year old male with PMH of HTN, DM, CAD, Afib (Eliquis) presents with bradycardia. Admitted for further evaluation     Primary team to confirm meds

## 2022-12-14 NOTE — H&P ADULT - NSHPLABSRESULTS_GEN_ALL_CORE
LABS:                        13.7   8.32  )-----------( 205      ( 14 Dec 2022 11:27 )             41.6     12-14    141  |  106  |  19<H>  ----------------------------<  131<H>  4.4   |  28  |  0.99    Ca    8.2<L>      14 Dec 2022 11:27    TPro  6.6  /  Alb  3.2<L>  /  TBili  1.6<H>  /  DBili  x   /  AST  13  /  ALT  20  /  AlkPhos  71  12-14        LIVER FUNCTIONS - ( 14 Dec 2022 11:27 )  Alb: 3.2 g/dL / Pro: 6.6 g/dL / ALK PHOS: 71 U/L / ALT: 20 U/L DA / AST: 13 U/L / GGT: x

## 2022-12-14 NOTE — H&P ADULT - NSHPPHYSICALEXAM_GEN_ALL_CORE
PHYSICAL EXAMINATION:  GENERAL: NAD  HEAD:  Atraumatic, Normocephalic  EYES:  conjunctiva and sclera clear  NECK: Supple, No JVD, Normal thyroid  CHEST/LUNG: Clear to auscultation. Clear to percussion bilaterally; No rales, rhonchi, wheezing, or rubs  HEART: Regular rate and rhythm; No murmurs, rubs, or gallops  ABDOMEN: Soft, Nontender, Nondistended; Bowel sounds present  NERVOUS SYSTEM:  Alert & Oriented X3,    EXTREMITIES:  2+ Peripheral Pulses, No clubbing, cyanosis, or edema  SKIN: warm dry

## 2022-12-14 NOTE — H&P ADULT - HISTORY OF PRESENT ILLNESS
82 year old male with PMH of DM, HTN, CAD, Afib presents after being found hypotensive and bradycardic at outpatient PCP. Patient states he was scheduled for stress tesd outpatient and when he arrived, he said the nurses told ml his heart rate was low and needed to come to the ED. Patient currently denies any sxs and denies any lightheadedness chest pain, shortness of breath, palpitations, abdominal pain, or change in bowel habits.  82 year old male with PMH of DM, HTN, CAD, Afib presents after being found hypotensive and bradycardic at outpatient PCP. Patient states he was scheduled for stress test outpatient and when he arrived, he said the nurses told him his heart rate was low and needed to come to the ED. Patient currently denies any sxs and denies any lightheadedness chest pain, shortness of breath, palpitations, abdominal pain, or change in bowel habits.     In the ED:  Afebrile, HR 45-55, /89  No electrolyte abnormalities  Trop X1 negative  EKG sinus bradycardia

## 2022-12-14 NOTE — ED PROVIDER NOTE - CLINICAL SUMMARY MEDICAL DECISION MAKING FREE TEXT BOX
Patient sent from cardiology site for bradycardia and hypotension.  Hypotension is now resolved but patient remains bradycardic.  This could be medication induced as he is on beta-blocker however he denies any significant overdoses.  Plan for labs, troponin, viral testing and chest x-ray.  Given patient's risk factors for cardiac disease and his bradycardia I believe the prudent thing to do is to admit him to the hospital for further telemetry monitoring and to complete the stress testing as an inpatient if he is stable.

## 2022-12-14 NOTE — H&P ADULT - PROBLEM SELECTOR PLAN 5
IMPROVE VTE Individual Risk Assessment          RISK                                                          Points  [  ] Previous VTE                                                3  [  ] Thrombophilia                                             2  [  ] Lower limb paralysis                                   2        (unable to hold up >15 seconds)    [  ] Current Cancer                                             2         (within 6 months)  [x  ] Immobilization > 24 hrs                              1  [  ] ICU/CCU stay > 24 hours                             1  [ x ] Age > 60                                                         1    IMPROVE VTE Score:         [    2     ]    FD Lovenox

## 2022-12-14 NOTE — H&P ADULT - ATTENDING COMMENTS
#Bradycardia  #HTN, chronic  #HLD, chronic  #CAD s/p PCI  #DM, chronic  #A-fib on Eliquis    82y M with PMH of HTN, DM, CAD s/p PCI, HLD, and PAD, admitted with bradycardia after presenting to his OP cardiologist's office for stress test. Prior to stress test, patient's vitals were checked and he was found with bradycardia and hypotension with BP 70/30.  required to obtain history (Marge [id number]). Patient states recently he developed exertional dyspnea with burning sensation in his left chest region that lasts 1-3 minutes. He checks his BP twice a day at home, ranges between 120-140 usually, but if elevated, will take extra doses of his medications, though denies doing this yesterday - will need to confirm this with patient's wife, as well as current medications. At time of admission, patient asymptomatic, ROS negative for any chest pain, SOB, dizziness. Start patient on telemetry to monitor for tachy-conner syndrome, consult cardio. F/U echocardiogram. Patient on Eliquis at home, will start FD Lovenox for now, with plan to resume Eliquis if no interventions planned. Hold beta blocker in setting of bradycardia. Medications for chronic conditions continued.  FH: denies heart disease, DM, cancer  Social hx: denies tobacco, alcohol, drugs    GENERAL: NAD, well-developed  HEAD:  Atraumatic, Normocephalic  EYES: EOMI, b/l anicteric sclera   NECK: Supple, No JVD  CHEST/LUNG: Clear to auscultation bilaterally, symmetrical chest rise  HEART: Sinus bradycardia; No murmurs, rubs, or gallops  ABDOMEN: Soft, Nontender, Nondistended; Bowel sounds present  EXTREMITIES:  2+ Peripheral Pulses, No clubbing, cyanosis, or edema  PSYCH: AAOx3, normal affect  NEUROLOGY: cranial nerves grossly intact, moves all extremities  SKIN: No rashes or lesions, dry, warm #Bradycardia  #HTN, chronic  #HLD, chronic  #CAD s/p PCI  #DM, chronic  #A-fib on Eliquis    82y M with PMH of HTN, DM, CAD s/p PCI, HLD, and PAD, admitted with bradycardia after presenting to his OP cardiologist's office for stress test. Prior to stress test, patient's vitals were checked and he was found with bradycardia and hypotension with BP 70/30.  required to obtain history (Marge [id number]). Patient states recently he developed exertional dyspnea with burning sensation in his left chest region that lasts 1-3 minutes. He checks his BP twice a day at home, ranges between 120-140 usually, but if elevated, will take extra doses of his medications, though denies doing this yesterday - will need to confirm this with patient's wife, as well as current medications. At time of admission, patient asymptomatic, ROS negative for any chest pain, SOB, dizziness. Start patient on telemetry to monitor for tachy-conner syndrome, consult cardio. F/U echocardiogram. Patient on Eliquis at home, will start FD Lovenox for now, with plan to resume Eliquis if no interventions planned. Hold beta blocker in setting of bradycardia. Repeat CMP to recheck total bili with AM labs. Medications for chronic conditions continued.  FH: denies heart disease, DM, cancer  Social hx: denies tobacco, alcohol, drugs    GENERAL: NAD, well-developed  HEAD:  Atraumatic, Normocephalic  EYES: EOMI, b/l anicteric sclera   NECK: Supple, No JVD  CHEST/LUNG: Clear to auscultation bilaterally, symmetrical chest rise  HEART: Sinus bradycardia; No murmurs, rubs, or gallops  ABDOMEN: Soft, Nontender, Nondistended; Bowel sounds present  EXTREMITIES:  2+ Peripheral Pulses, No clubbing, cyanosis, or edema  PSYCH: AAOx3, normal affect  NEUROLOGY: cranial nerves grossly intact, moves all extremities  SKIN: No rashes or lesions, dry, warm

## 2022-12-14 NOTE — ED ADULT NURSE NOTE - OBJECTIVE STATEMENT
Pt arrived from [facility] clinic , was scheduled for a stress test , but he did not do it due to hypotension and bradycardia   Pt is on Lopressor and denies any symptoms, wants to go home

## 2022-12-14 NOTE — ED PROVIDER NOTE - NSICDXPASTMEDICALHX_GEN_ALL_CORE_FT
PAST MEDICAL HISTORY:  DM (diabetes mellitus)     Former smoker     Gastritis     Hepatomegaly     HTN (hypertension)     MI (myocardial infarction)     MI (myocardial infarction)     PUD (peptic ulcer disease) remote

## 2022-12-15 ENCOUNTER — TRANSCRIPTION ENCOUNTER (OUTPATIENT)
Age: 82
End: 2022-12-15

## 2022-12-15 VITALS
TEMPERATURE: 98 F | OXYGEN SATURATION: 100 % | RESPIRATION RATE: 18 BRPM | SYSTOLIC BLOOD PRESSURE: 147 MMHG | DIASTOLIC BLOOD PRESSURE: 54 MMHG | HEART RATE: 55 BPM

## 2022-12-15 LAB
A1C WITH ESTIMATED AVERAGE GLUCOSE RESULT: 7.2 % — HIGH (ref 4–5.6)
ALBUMIN SERPL ELPH-MCNC: 3.2 G/DL — LOW (ref 3.5–5)
ALP SERPL-CCNC: 68 U/L — SIGNIFICANT CHANGE UP (ref 40–120)
ALT FLD-CCNC: 19 U/L DA — SIGNIFICANT CHANGE UP (ref 10–60)
ANION GAP SERPL CALC-SCNC: 7 MMOL/L — SIGNIFICANT CHANGE UP (ref 5–17)
AST SERPL-CCNC: 12 U/L — SIGNIFICANT CHANGE UP (ref 10–40)
BASOPHILS # BLD AUTO: 0.06 K/UL — SIGNIFICANT CHANGE UP (ref 0–0.2)
BASOPHILS NFR BLD AUTO: 0.8 % — SIGNIFICANT CHANGE UP (ref 0–2)
BILIRUB SERPL-MCNC: 1.4 MG/DL — HIGH (ref 0.2–1.2)
BUN SERPL-MCNC: 21 MG/DL — HIGH (ref 7–18)
CALCIUM SERPL-MCNC: 8.5 MG/DL — SIGNIFICANT CHANGE UP (ref 8.4–10.5)
CHLORIDE SERPL-SCNC: 109 MMOL/L — HIGH (ref 96–108)
CHOLEST SERPL-MCNC: 113 MG/DL — SIGNIFICANT CHANGE UP
CO2 SERPL-SCNC: 28 MMOL/L — SIGNIFICANT CHANGE UP (ref 22–31)
CREAT SERPL-MCNC: 1.05 MG/DL — SIGNIFICANT CHANGE UP (ref 0.5–1.3)
EGFR: 70 ML/MIN/1.73M2 — SIGNIFICANT CHANGE UP
EOSINOPHIL # BLD AUTO: 0.36 K/UL — SIGNIFICANT CHANGE UP (ref 0–0.5)
EOSINOPHIL NFR BLD AUTO: 5.1 % — SIGNIFICANT CHANGE UP (ref 0–6)
ESTIMATED AVERAGE GLUCOSE: 160 MG/DL — HIGH (ref 68–114)
GLUCOSE BLDC GLUCOMTR-MCNC: 98 MG/DL — SIGNIFICANT CHANGE UP (ref 70–99)
GLUCOSE SERPL-MCNC: 118 MG/DL — HIGH (ref 70–99)
HCT VFR BLD CALC: 39.4 % — SIGNIFICANT CHANGE UP (ref 39–50)
HDLC SERPL-MCNC: 46 MG/DL — SIGNIFICANT CHANGE UP
HGB BLD-MCNC: 13 G/DL — SIGNIFICANT CHANGE UP (ref 13–17)
IMM GRANULOCYTES NFR BLD AUTO: 0.1 % — SIGNIFICANT CHANGE UP (ref 0–0.9)
LIPID PNL WITH DIRECT LDL SERPL: 47 MG/DL — SIGNIFICANT CHANGE UP
LYMPHOCYTES # BLD AUTO: 2.43 K/UL — SIGNIFICANT CHANGE UP (ref 1–3.3)
LYMPHOCYTES # BLD AUTO: 34.2 % — SIGNIFICANT CHANGE UP (ref 13–44)
MAGNESIUM SERPL-MCNC: 2.1 MG/DL — SIGNIFICANT CHANGE UP (ref 1.6–2.6)
MCHC RBC-ENTMCNC: 28.4 PG — SIGNIFICANT CHANGE UP (ref 27–34)
MCHC RBC-ENTMCNC: 33 GM/DL — SIGNIFICANT CHANGE UP (ref 32–36)
MCV RBC AUTO: 86 FL — SIGNIFICANT CHANGE UP (ref 80–100)
MONOCYTES # BLD AUTO: 0.53 K/UL — SIGNIFICANT CHANGE UP (ref 0–0.9)
MONOCYTES NFR BLD AUTO: 7.5 % — SIGNIFICANT CHANGE UP (ref 2–14)
NEUTROPHILS # BLD AUTO: 3.71 K/UL — SIGNIFICANT CHANGE UP (ref 1.8–7.4)
NEUTROPHILS NFR BLD AUTO: 52.3 % — SIGNIFICANT CHANGE UP (ref 43–77)
NON HDL CHOLESTEROL: 67 MG/DL — SIGNIFICANT CHANGE UP
NRBC # BLD: 0 /100 WBCS — SIGNIFICANT CHANGE UP (ref 0–0)
PHOSPHATE SERPL-MCNC: 3.5 MG/DL — SIGNIFICANT CHANGE UP (ref 2.5–4.5)
PLATELET # BLD AUTO: 191 K/UL — SIGNIFICANT CHANGE UP (ref 150–400)
POTASSIUM SERPL-MCNC: 4.3 MMOL/L — SIGNIFICANT CHANGE UP (ref 3.5–5.3)
POTASSIUM SERPL-SCNC: 4.3 MMOL/L — SIGNIFICANT CHANGE UP (ref 3.5–5.3)
PROT SERPL-MCNC: 6.4 G/DL — SIGNIFICANT CHANGE UP (ref 6–8.3)
RBC # BLD: 4.58 M/UL — SIGNIFICANT CHANGE UP (ref 4.2–5.8)
RBC # FLD: 13 % — SIGNIFICANT CHANGE UP (ref 10.3–14.5)
SODIUM SERPL-SCNC: 144 MMOL/L — SIGNIFICANT CHANGE UP (ref 135–145)
TRIGL SERPL-MCNC: 98 MG/DL — SIGNIFICANT CHANGE UP
TSH SERPL-MCNC: 1.79 UU/ML — SIGNIFICANT CHANGE UP (ref 0.34–4.82)
WBC # BLD: 7.1 K/UL — SIGNIFICANT CHANGE UP (ref 3.8–10.5)
WBC # FLD AUTO: 7.1 K/UL — SIGNIFICANT CHANGE UP (ref 3.8–10.5)

## 2022-12-15 PROCEDURE — 84484 ASSAY OF TROPONIN QUANT: CPT

## 2022-12-15 PROCEDURE — 93017 CV STRESS TEST TRACING ONLY: CPT

## 2022-12-15 PROCEDURE — 83735 ASSAY OF MAGNESIUM: CPT

## 2022-12-15 PROCEDURE — 93016 CV STRESS TEST SUPVJ ONLY: CPT

## 2022-12-15 PROCEDURE — 78452 HT MUSCLE IMAGE SPECT MULT: CPT | Mod: 26

## 2022-12-15 PROCEDURE — 99285 EMERGENCY DEPT VISIT HI MDM: CPT | Mod: 25

## 2022-12-15 PROCEDURE — A9502: CPT

## 2022-12-15 PROCEDURE — 93306 TTE W/DOPPLER COMPLETE: CPT

## 2022-12-15 PROCEDURE — 36415 COLL VENOUS BLD VENIPUNCTURE: CPT

## 2022-12-15 PROCEDURE — 83036 HEMOGLOBIN GLYCOSYLATED A1C: CPT

## 2022-12-15 PROCEDURE — 99239 HOSP IP/OBS DSCHRG MGMT >30: CPT

## 2022-12-15 PROCEDURE — 82962 GLUCOSE BLOOD TEST: CPT

## 2022-12-15 PROCEDURE — 93018 CV STRESS TEST I&R ONLY: CPT

## 2022-12-15 PROCEDURE — 84100 ASSAY OF PHOSPHORUS: CPT

## 2022-12-15 PROCEDURE — 80061 LIPID PANEL: CPT

## 2022-12-15 PROCEDURE — 87637 SARSCOV2&INF A&B&RSV AMP PRB: CPT

## 2022-12-15 PROCEDURE — 84443 ASSAY THYROID STIM HORMONE: CPT

## 2022-12-15 PROCEDURE — 93005 ELECTROCARDIOGRAM TRACING: CPT

## 2022-12-15 PROCEDURE — 78452 HT MUSCLE IMAGE SPECT MULT: CPT

## 2022-12-15 PROCEDURE — 85025 COMPLETE CBC W/AUTO DIFF WBC: CPT

## 2022-12-15 PROCEDURE — 80053 COMPREHEN METABOLIC PANEL: CPT

## 2022-12-15 RX ORDER — METOPROLOL TARTRATE 50 MG
1 TABLET ORAL
Qty: 0 | Refills: 0 | DISCHARGE

## 2022-12-15 RX ORDER — ESOMEPRAZOLE MAGNESIUM 40 MG/1
1 CAPSULE, DELAYED RELEASE ORAL
Qty: 0 | Refills: 0 | DISCHARGE

## 2022-12-15 RX ORDER — ROSUVASTATIN CALCIUM 5 MG/1
1 TABLET ORAL
Qty: 0 | Refills: 0 | DISCHARGE

## 2022-12-15 RX ORDER — ISOSORBIDE MONONITRATE 60 MG/1
1 TABLET, EXTENDED RELEASE ORAL
Qty: 0 | Refills: 0 | DISCHARGE

## 2022-12-15 RX ORDER — DICLOFENAC SODIUM 30 MG/G
1 GEL TOPICAL
Qty: 0 | Refills: 0 | DISCHARGE

## 2022-12-15 RX ORDER — AMLODIPINE BESYLATE 2.5 MG/1
5 TABLET ORAL DAILY
Refills: 0 | Status: DISCONTINUED | OUTPATIENT
Start: 2022-12-15 | End: 2022-12-15

## 2022-12-15 RX ORDER — LOSARTAN POTASSIUM 100 MG/1
1 TABLET, FILM COATED ORAL
Qty: 30 | Refills: 0
Start: 2022-12-15 | End: 2023-01-13

## 2022-12-15 RX ORDER — GABAPENTIN 400 MG/1
1 CAPSULE ORAL
Qty: 0 | Refills: 0 | DISCHARGE

## 2022-12-15 RX ORDER — METFORMIN HYDROCHLORIDE 850 MG/1
1 TABLET ORAL
Qty: 0 | Refills: 0 | DISCHARGE

## 2022-12-15 RX ORDER — FUROSEMIDE 40 MG
1 TABLET ORAL
Qty: 0 | Refills: 0 | DISCHARGE

## 2022-12-15 RX ORDER — AMLODIPINE BESYLATE AND BENAZEPRIL HYDROCHLORIDE 10; 20 MG/1; MG/1
1 CAPSULE ORAL
Qty: 0 | Refills: 0 | DISCHARGE

## 2022-12-15 NOTE — DISCHARGE NOTE PROVIDER - NSDCMRMEDTOKEN_GEN_ALL_CORE_FT
Aspir 81 oral delayed release tablet: 1 tab(s) orally once a day   dicyclomine 10 mg oral capsule: 1 cap(s) orally 4 times a day  Eliquis 2.5 mg oral tablet: 1 tab(s) orally 2 times a day  losartan 25 mg oral tablet: 1 tab(s) orally once a day   metFORMIN 500 mg oral tablet, extended release: 1 tab(s) orally 2 times a day  ranolazine 500 mg oral tablet, extended release: 1 tab(s) orally 2 times a day   Tylenol 500 mg oral tablet: 2 tab(s) orally every 6 hours, As Needed  Vitamin D2 50,000 intl units (1.25 mg) oral capsule: 1 cap(s) orally once a week

## 2022-12-15 NOTE — DISCHARGE NOTE NURSING/CASE MANAGEMENT/SOCIAL WORK - PATIENT PORTAL LINK FT
You can access the FollowMyHealth Patient Portal offered by [facility] by registering at the following website: [email or web address]. By joining [employer]’s FollowMyHealth portal, you will also be able to view your health information using other applications (apps) compatible with our system.

## 2022-12-15 NOTE — DISCHARGE NOTE PROVIDER - ATTENDING DISCHARGE PHYSICAL EXAMINATION:
GENERAL: NAD, well-developed  HEAD:  Atraumatic, Normocephalic  EYES: EOMI, b/l anicteric sclera   NECK: Supple, No JVD  CHEST/LUNG: Clear to auscultation bilaterally, symmetrical chest rise  HEART: Sinus bradycardia; No murmurs, rubs, or gallops  ABDOMEN: Soft, Nontender, Nondistended; Bowel sounds present  EXTREMITIES:  2+ Peripheral Pulses, No clubbing, cyanosis, or edema  PSYCH: AAOx3, normal affect  NEUROLOGY: cranial nerves grossly intact, moves all extremities  SKIN: No rashes or lesions, dry, warm.

## 2022-12-15 NOTE — PROGRESS NOTE ADULT - SUBJECTIVE AND OBJECTIVE BOX
PGY-1 Progress Note discussed with attending    PAGER #: [[phone]] TILL 5:00 PM  PLEASE CONTACT ON CALL TEAM:   - On Call Team (Please refer to Bryson) FROM 5:00 PM - 8:30PM  - Nightfloat Team FROM 8:30 -7:30 AM    CHIEF COMPLAINT & BRIEF HOSPITAL COURSE:      INTERVAL HPI/OVERNIGHT EVENTS:       REVIEW OF SYSTEMS:  CONSTITUTIONAL: No fever, weight loss, or fatigue  RESPIRATORY: No cough, wheezing, chills or hemoptysis; No shortness of breath  CARDIOVASCULAR: No chest pain, palpitations, dizziness, or leg swelling  GASTROINTESTINAL: No abdominal pain. No nausea, vomiting, or hematemesis; No diarrhea or constipation. No melena or hematochezia.  GENITOURINARY: No dysuria or hematuria, urinary frequency  NEUROLOGICAL: No headaches, memory loss, loss of strength, numbness, or tremors  SKIN: No itching, burning, rashes, or lesions     MEDICATIONS  (STANDING):  enoxaparin Injectable 90 milliGRAM(s) SubCutaneous every 12 hours  sodium chloride 0.9%. 1000 milliLiter(s) (70 mL/Hr) IV Continuous <Continuous>    MEDICATIONS  (PRN):      Vital Signs Last 24 Hrs  T(C): 36.7 (15 Dec 2022 07:32), Max: 36.9 (15 Dec 2022 00:05)  T(F): 98 (15 Dec 2022 07:32), Max: 98.5 (15 Dec 2022 00:05)  HR: 49 (15 Dec 2022 07:32) (46 - 53)  BP: 150/65 (15 Dec 2022 07:32) (109/63 - 150/65)  BP(mean): --  RR: 18 (15 Dec 2022 07:32) (16 - 20)  SpO2: 98% (15 Dec 2022 07:32) (96% - 98%)    Parameters below as of 15 Dec 2022 07:32  Patient On (Oxygen Delivery Method): room air        PHYSICAL EXAMINATION:  GENERAL: NAD, well built  HEAD:  Atraumatic, Normocephalic  EYES:  conjunctiva and sclera clear  NECK: Supple, No JVD, Normal thyroid  CHEST/LUNG: Clear to auscultation. Clear to percussion bilaterally; No rales, rhonchi, wheezing, or rubs  HEART: Regular rate and rhythm; No murmurs, rubs, or gallops  ABDOMEN: Soft, Nontender, Nondistended; Bowel sounds present  NERVOUS SYSTEM:  Alert & Oriented X3,    EXTREMITIES:  2+ Peripheral Pulses, No clubbing, cyanosis, or edema  SKIN: warm dry                          13.0   7.10  )-----------( 191      ( 15 Dec 2022 05:20 )             39.4     12-15    144  |  109<H>  |  21<H>  ----------------------------<  118<H>  4.3   |  28  |  1.05    Ca    8.5      15 Dec 2022 05:20  Phos  3.5     12-15  Mg     2.1     12-15    TPro  6.4  /  Alb  3.2<L>  /  TBili  1.4<H>  /  DBili  x   /  AST  12  /  ALT  19  /  AlkPhos  68  12-15    LIVER FUNCTIONS - ( 15 Dec 2022 05:20 )  Alb: 3.2 g/dL / Pro: 6.4 g/dL / ALK PHOS: 68 U/L / ALT: 19 U/L DA / AST: 12 U/L / GGT: x                       I&O's Summary      CAPILLARY BLOOD GLUCOSE      POCT Blood Glucose.: 98 mg/dL (15 Dec 2022 08:51)    CAPILLARY BLOOD GLUCOSE      POCT Blood Glucose.: 98 mg/dL (15 Dec 2022 08:51)      RADIOLOGY & ADDITIONAL TESTS:

## 2022-12-15 NOTE — CONSULT NOTE ADULT - ASSESSMENT
Patient is an 82 year old [place] speaking male with PMH of HTN, DM, CAD, Afib (Eliquis) presents with bradycardia. Upon evaluation in the ED, patient afebrile, bradycardic with heart rate ranging from 45-55bpm but  hemodynamically stable. Cardiac enzymes were negative, EKG showing sinus bradycardia. Patient admitted to telemetry for further evaluation, Cardiology consulted for bradycardia.

## 2022-12-15 NOTE — PROGRESS NOTE ADULT - ASSESSMENT
82 year old male with PMH of HTN, DM, CAD, Afib (Eliquis) presents with bradycardia. Admitted for further evaluation     Primary team to confirm meds

## 2022-12-15 NOTE — CONSULT NOTE ADULT - SUBJECTIVE AND OBJECTIVE BOX
Patient is a 81 yo [place]inean speaking male with hx  of DM, HTN, CAD, Afib presents after being found hypotensive and bradycardic at outpatient PCP. Patient states that he was going to get exercise stress test with outpatient cardiologist and was told by the nurses that his heart rate was too low and blood pressure as well. Patient states the he had no chest pain, sob or palpitations. Patient currently denies chest pain, sob or palpitations, Denies LE swelling, cough or PND. Patient denies abdominal pain, nausea or vomiting. Cardiology consulted for bradycardia.     ROS   CONSTITUTIONAL: No changes in  appetite or generalized weakness.  No fever, weight loss, or fatigue  EYES: No eye pain, visual disturbances, or discharge  ENT:  No difficulty hearing, tinnitus, vertigo; No sinus or throat pain  NECK: No pain or stiffness  RESPIRATORY: No cough, wheezing, chills or hemoptysis; No Shortness of Breath  CARDIOVASCULAR: No chest pain, palpitations, passing out, dizziness, or leg swelling  GASTROINTESTINAL: No abdominal or epigastric pain. No nausea, vomiting, or hematemesis; No diarrhea or constipation. No melena or hematochezia.  GENITOURINARY: No dysuria, frequency, hematuria, or incontinence  NEUROLOGICAL: No headaches, memory loss, loss of strength, numbness, or tremors  SKIN: No skin lesions   LYMPH Nodes: No enlarged glands  ENDOCRINE: No heat or cold intolerance; No hair loss  MUSCULOSKELETAL: No joint pain or swelling; No muscle, back, No extremity pain  PSYCHIATRIC: No depression, anxiety, mood swings, or difficulty sleeping  HEME/LYMPH: No easy bruising, or bleeding gums  ALLERGY AND IMMUNOLOGIC: No hives or eczema     Vitals     T(C): 36.7 (12-15-22 @ 07:32), Max: 36.9 (12-15-22 @ 00:05)  HR: 49 (12-15-22 @ 07:32) (46 - 53)  BP: 150/65 (12-15-22 @ 07:32) (109/63 - 150/65)  RR: 18 (12-15-22 @ 07:32) (16 - 20)  SpO2: 98% (12-15-22 @ 07:32) (96% - 98%)    PHYSICAL EXAM:  GENERAL: NAD, speaks in full sentences, no signs of respiratory distress  HEAD:  Atraumatic, Normocephalic  EYES: EOMI, PERRLA, conjunctiva and sclera clear  NECK: Supple, No JVD  CHEST/LUNG: Clear to auscultation bilaterally; No wheeze; No crackles; No accessory muscles used  HEART: Regular rate and rhythm; No murmurs; gallops or rubs   ABDOMEN: Soft, Nontender, Nondistended; Bowel sounds present; No guarding  EXTREMITIES:  2+ Peripheral Pulses, No cyanosis or edema  PSYCH: AAOx3  NEUROLOGY: non-focal  SKIN: No rashes or lesions

## 2022-12-15 NOTE — DISCHARGE NOTE NURSING/CASE MANAGEMENT/SOCIAL WORK - NSDPDISTO_GEN_ALL_CORE
No new care gaps identified.  [facility] Embedded Care Gaps. Reference number: [id number]. 5/10/2022   12:07:25 PM CDT   Home

## 2022-12-15 NOTE — CONSULT NOTE ADULT - PROBLEM SELECTOR RECOMMENDATION 9
- patient noted to have bradycardia   - EKG showing Sinus bradycardia   - Cardiac enzymes negative  - NST today, if NST is normal patient can be discharged with outpatient cardiologist follow up

## 2022-12-15 NOTE — DISCHARGE NOTE PROVIDER - NSDCCPCAREPLAN_GEN_ALL_CORE_FT
PRINCIPAL DISCHARGE DIAGNOSIS  Diagnosis: Sinus bradycardia  Assessment and Plan of Treatment: You came to the hospital with low blood pressure and low heart rate. Cardiology, Dr. Haines was consulted. We monitored you on telemetry. You had a stress test done which showed no signs of ischemia.   We discontinued Lasix, metoprolol and isosorbide mononitrate. This combination of medication could have caused your heart rate and blood pressure to be very low. We started a new medication for your blood pressure called Losartan. Take this medication as prescribed. Follow up with your PCP and cardiologist within 1-2 weeks of discharge.      SECONDARY DISCHARGE DIAGNOSES  Diagnosis: Atrial fibrillation  Assessment and Plan of Treatment: You were previously diagnosed to have an arrythmia of your heart called atrial fibrillation which means you heart may frequently beat faster than 150 beats per minute. You were continued on an anticoagulant called eliquis which prevents rapid beating of your heart from forming and dislodging a clot which could potentially cause strokes or clots in your lungs. You are recommended to take eliquis twice daily and please report to your PCP if you have any excessive bleeding from your nose, in your urine, or other sites.  We discontinued your metoprolol. This may have cause your heart rate to go very low. We started you on a different blood pressure medication called Losartan. Follow up with your PCP within 1 week of discharge.    Diagnosis: Diabetes  Assessment and Plan of Treatment: Continue with your blood sugar medication.  Please check your blood sugar levels twice daily - Morning/Afternoon, and Lunch/Bedtime the following day. Keep a record of your blood sugar readings  You must maintain a healthy diet that consists of low sugar and low fat. Your diet must consist of mostly vegetables. Please limit your intake of high sugar and high carbohydrate foods such as pasta, rice, and bread. Exercise frequently if possible. Follow up with primary care physician within one week of your discharge to further manage your diabetes and monitor your Hemoglobin A1c levels after 3 months to evaluate your diabetes control.    Diagnosis: HTN (hypertension)  Assessment and Plan of Treatment: You have high blood pressure, for which you have been started on medications. It is important to continue to take your medications on time,  monitor your blood pressure at home, keep a log of your home readings and follow up with your Primary Care Physician. As per AHA/ACC guidelines, it is important to adhere to a DASH Diet of fresh fruits, vegetables, lean meats such as poultry and fish, and whole wheat carbs. 30 minutes of aerobic exercise per day 3-4 times a week, reducing salt intake <1.5g/day, and cutting down on highly processed foods are also shown to reduce BP. Uncontrolled BP may result in organ damage to your eyes, brain, heart, and kidneys by causing strokes, heart attacks, kidney failure, and bleeds in your eye.

## 2023-05-30 PROBLEM — Z00.00 ENCOUNTER FOR PREVENTIVE HEALTH EXAMINATION: Status: ACTIVE | Noted: 2023-05-30

## 2023-05-31 ENCOUNTER — APPOINTMENT (OUTPATIENT)
Dept: GASTROENTEROLOGY | Facility: CLINIC | Age: 83
End: 2023-05-31
Payer: MEDICARE

## 2023-05-31 DIAGNOSIS — K86.9 DISEASE OF PANCREAS, UNSPECIFIED: ICD-10-CM

## 2023-05-31 PROCEDURE — 99204 OFFICE O/P NEW MOD 45 MIN: CPT

## 2023-05-31 NOTE — ASSESSMENT
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 \par \par Plan \par EUS with [facility] \par Hold plavix for 5 days \par Hold Eliquis \par i spoke with patients cardiologist Dr. Williamson's PA  Darlyn. As per Darlyn patient is cleared for EUS and can hold Plavix for 5 days and Eliquis for 2 days. \par \par Risks, benefits, and alternatives of EUS discussed at length with patient including but not limited to bleeding (especially in light of Eliquis/Plavix), perforation, anesthesia related complication, aspiration, pancreatitis etc. Patient expressed understanding.\par \par EUS for evaluation of polyps, tumors, nodules with +/- biopsy and or cauterization w/ and or w/o clipping. \par

## 2023-05-31 NOTE — PHYSICAL EXAM
[Alert] : alert [Normal Voice/Communication] : normal voice/communication [Healthy Appearing] : healthy appearing [No Acute Distress] : no acute distress [Sclera] : the sclera and conjunctiva were normal [Hearing Threshold Finger Rub Not [place]] : hearing was normal [Normal Lips/Gums] : the lips and gums were normal [Oropharynx] : the oropharynx was normal [Normal Appearance] : the appearance of the neck was normal [No Neck Mass] : no neck mass was observed [No Respiratory Distress] : no respiratory distress [No Acc Muscle Use] : no accessory muscle use [Respiration, Rhythm And Depth] : normal respiratory rhythm and effort [Auscultation Breath Sounds / Voice Sounds] : lungs were clear to auscultation bilaterally [Heart Rate And Rhythm] : heart rate was normal and rhythm regular [Normal S1, S2] : normal S1 and S2 [Murmurs] : no murmurs [Bowel Sounds] : normal bowel sounds [Abdomen Tenderness] : non-tender [No Masses] : no abdominal mass palpated [Abdomen Soft] : soft [] : no hepatosplenomegaly [Oriented To Time, Place, And Person] : oriented to person, place, and time

## 2023-05-31 NOTE — REASON FOR VISIT
[Home] : at home, [unfilled] , at the time of the visit. [Medical Office: ([facility])___] : at the medical office located in  [This encounter was initiated by telehealth (audio with video) and converted to telephone (audio only) due to technical difficulties.] : This encounter was initiated by telehealth (audio with video) and converted to telephone (audio only) due to technical difficulties. [FreeTextEntry2] : Radha [FreeTextEntry3] : wife

## 2023-05-31 NOTE — HISTORY OF PRESENT ILLNESS
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 Referred by Dr. Membreno. PMH of DM, HTN, CAD on Plavix, Afib on Eliquis. Denies a family history of colon CA, gastric CA, high risk polyps, Inflammatory and autoimmune disease. Patient has been following Dr. Membreno a routine colon cancer screening. Due to patient medical comorbidities a virtual colonoscopy was recommended. The [place] colonoscopy from 5/24/23 revealed a new solid appearing mass (3cm) in the mid pancreatic tail with slightly increased in size of a complex cystic lesion (4.2cm) in the distal pancreatic tail. Worrisome for developing primary pancreatic neoplasm. In comparison to CT scan from 2020, the tail mass was not there and the cystic lesion grew slightly in size. Also, there are numerous nodules that have developed throughout the bilateral lung bases since 3/24/21. These are suspicious for metastases. Currently patient denies any N&V or abdominal pain. Tolerating meals. Decrease appetite.  No blood or dark tarry stools. Normal caliber. No jaundice. Urine yellow. Down 30 pounds in 12 months.   \par Smoke/Etoh: Former smoker. Former EToh abuse. Last ETOH use 2000\par Medication: on Plavix and Eliquis \par Allergy:  none\par \par

## 2023-06-07 ENCOUNTER — APPOINTMENT (OUTPATIENT)
Dept: GASTROENTEROLOGY | Facility: HOSPITAL | Age: 83
End: 2023-06-07

## 2023-06-07 ENCOUNTER — TRANSCRIPTION ENCOUNTER (OUTPATIENT)
Age: 83
End: 2023-06-07

## 2023-06-07 ENCOUNTER — OUTPATIENT (OUTPATIENT)
Dept: OUTPATIENT SERVICES | Facility: HOSPITAL | Age: 83
LOS: 1 days | End: 2023-06-07
Payer: MEDICARE

## 2023-06-07 ENCOUNTER — RESULT REVIEW (OUTPATIENT)
Age: 83
End: 2023-06-07

## 2023-06-07 VITALS
HEART RATE: 68 BPM | RESPIRATION RATE: 17 BRPM | SYSTOLIC BLOOD PRESSURE: 127 MMHG | DIASTOLIC BLOOD PRESSURE: 58 MMHG | OXYGEN SATURATION: 99 %

## 2023-06-07 VITALS
HEIGHT: 69.69 IN | RESPIRATION RATE: 16 BRPM | OXYGEN SATURATION: 96 % | WEIGHT: 177.91 LBS | HEART RATE: 66 BPM | DIASTOLIC BLOOD PRESSURE: 67 MMHG | SYSTOLIC BLOOD PRESSURE: 132 MMHG | TEMPERATURE: 98 F

## 2023-06-07 DIAGNOSIS — Z95.5 PRESENCE OF CORONARY ANGIOPLASTY IMPLANT AND GRAFT: Chronic | ICD-10-CM

## 2023-06-07 DIAGNOSIS — K86.9 DISEASE OF PANCREAS, UNSPECIFIED: ICD-10-CM

## 2023-06-07 DIAGNOSIS — Z98.890 OTHER SPECIFIED POSTPROCEDURAL STATES: Chronic | ICD-10-CM

## 2023-06-07 LAB — GLUCOSE BLDC GLUCOMTR-MCNC: 125 MG/DL — HIGH (ref 70–99)

## 2023-06-07 PROCEDURE — 43239 EGD BIOPSY SINGLE/MULTIPLE: CPT | Mod: XU

## 2023-06-07 PROCEDURE — 43242 EGD US FINE NEEDLE BX/ASPIR: CPT

## 2023-06-07 PROCEDURE — 88307 TISSUE EXAM BY PATHOLOGIST: CPT

## 2023-06-07 PROCEDURE — 88305 TISSUE EXAM BY PATHOLOGIST: CPT | Mod: 26,76

## 2023-06-07 PROCEDURE — 88305 TISSUE EXAM BY PATHOLOGIST: CPT | Mod: 26

## 2023-06-07 PROCEDURE — 88173 CYTOPATH EVAL FNA REPORT: CPT | Mod: 26

## 2023-06-07 PROCEDURE — 82962 GLUCOSE BLOOD TEST: CPT

## 2023-06-07 PROCEDURE — 88305 TISSUE EXAM BY PATHOLOGIST: CPT

## 2023-06-07 PROCEDURE — 88173 CYTOPATH EVAL FNA REPORT: CPT

## 2023-06-07 PROCEDURE — 88312 SPECIAL STAINS GROUP 1: CPT | Mod: 26

## 2023-06-07 PROCEDURE — 88307 TISSUE EXAM BY PATHOLOGIST: CPT | Mod: 26

## 2023-06-07 PROCEDURE — 43239 EGD BIOPSY SINGLE/MULTIPLE: CPT | Mod: XS

## 2023-06-07 PROCEDURE — 88312 SPECIAL STAINS GROUP 1: CPT

## 2023-06-07 RX ORDER — CIPROFLOXACIN LACTATE 400MG/40ML
400 VIAL (ML) INTRAVENOUS ONCE
Refills: 0 | Status: DISCONTINUED | OUTPATIENT
Start: 2023-06-07 | End: 2023-06-21

## 2023-06-07 RX ORDER — SODIUM CHLORIDE 9 MG/ML
500 INJECTION INTRAMUSCULAR; INTRAVENOUS; SUBCUTANEOUS
Refills: 0 | Status: DISCONTINUED | OUTPATIENT
Start: 2023-06-07 | End: 2023-06-21

## 2023-06-09 LAB — SURGICAL PATHOLOGY STUDY: SIGNIFICANT CHANGE UP

## 2023-06-09 NOTE — DISCHARGE NOTE PROVIDER - CARE PROVIDERS DIRECT ADDRESSES
The patient is Stable - Low risk of patient condition declining or worsening    Shift Goals  Clinical Goals: safety, pain managment,  Patient Goals: rest  Family Goals: REKHA    Progress made toward(s) clinical / shift goals:  patient remains free from hospital acquired injury, pain is managed through distraction and pharmacologic means.    Patient is not progressing towards the following goals:    Patient continues to experience hallucinations, irritability, memory loss and intermittent non-compliance with nursing staff. Reinforcement and education is being offered.      ,DirectAddress_Unknown

## 2023-06-12 DIAGNOSIS — A04.8 OTHER SPECIFIED BACTERIAL INTESTINAL INFECTIONS: ICD-10-CM

## 2023-06-12 RX ORDER — OMEPRAZOLE 40 MG/1
40 CAPSULE, DELAYED RELEASE ORAL TWICE DAILY
Qty: 28 | Refills: 0 | Status: ACTIVE | COMMUNITY
Start: 2023-06-12 | End: 1900-01-01

## 2023-06-12 RX ORDER — METRONIDAZOLE 500 MG/1
500 TABLET ORAL 3 TIMES DAILY
Qty: 42 | Refills: 0 | Status: ACTIVE | COMMUNITY
Start: 2023-06-12 | End: 1900-01-01

## 2023-06-12 RX ORDER — DOXYCYCLINE HYCLATE 100 MG/1
100 CAPSULE ORAL
Qty: 28 | Refills: 0 | Status: ACTIVE | COMMUNITY
Start: 2023-06-12 | End: 1900-01-01

## 2023-06-14 ENCOUNTER — APPOINTMENT (OUTPATIENT)
Dept: SURGICAL ONCOLOGY | Facility: CLINIC | Age: 83
End: 2023-06-14
Payer: MEDICARE

## 2023-06-14 DIAGNOSIS — C25.9 MALIGNANT NEOPLASM OF PANCREAS, UNSPECIFIED: ICD-10-CM

## 2023-06-14 PROCEDURE — 99205 OFFICE O/P NEW HI 60 MIN: CPT

## 2023-06-20 LAB — NON-GYNECOLOGICAL CYTOLOGY STUDY: SIGNIFICANT CHANGE UP

## 2023-06-26 ENCOUNTER — APPOINTMENT (OUTPATIENT)
Dept: CT IMAGING | Facility: HOSPITAL | Age: 83
End: 2023-06-26
Payer: MEDICARE

## 2023-06-26 ENCOUNTER — OUTPATIENT (OUTPATIENT)
Dept: OUTPATIENT SERVICES | Facility: HOSPITAL | Age: 83
LOS: 1 days | End: 2023-06-26
Payer: MEDICARE

## 2023-06-26 ENCOUNTER — APPOINTMENT (OUTPATIENT)
Dept: CT IMAGING | Facility: HOSPITAL | Age: 83
End: 2023-06-26

## 2023-06-26 DIAGNOSIS — C25.9 MALIGNANT NEOPLASM OF PANCREAS, UNSPECIFIED: ICD-10-CM

## 2023-06-26 DIAGNOSIS — Z95.5 PRESENCE OF CORONARY ANGIOPLASTY IMPLANT AND GRAFT: Chronic | ICD-10-CM

## 2023-06-26 PROCEDURE — 71260 CT THORAX DX C+: CPT | Mod: 26,MH

## 2023-06-26 PROCEDURE — 71260 CT THORAX DX C+: CPT

## 2023-06-26 PROCEDURE — 74177 CT ABD & PELVIS W/CONTRAST: CPT

## 2023-06-26 PROCEDURE — 74177 CT ABD & PELVIS W/CONTRAST: CPT | Mod: 26,MH

## 2023-07-13 NOTE — CONSULT LETTER
[DrRocio  ___] : Dr. VEGA [Dear  ___] : Dear  [unfilled], [FreeTextEntry2] : James Mzaa  [FreeTextEntry3] : Ghulam Jacobsen MD, FICS, FACS\par Director of Surgical Oncology- [facility] \par , Department of Surgery  \par [employer] [employer] [facility] \par [address]\[place] [address]\par \par [address]\[place] [address]\par \par [address]\[place] [address]\par \par (mob) [phone]\par (o) [phone]\par (f) [phone]\par

## 2023-07-13 NOTE — ASSESSMENT
[FreeTextEntry1] : IMP: 82 year old male present with pancreatic cancer\par \par \par \par PLAN:\par tumor marker and CT ches/abd/pelvis r/o metastatic disease. \par referred to med/onc Dr. Flores \par referred to Dr. Gimenez \par RTO via telehealth once imaging done. \par RTO July 2023 \par \par I have discussed the risks, benefits, alternatives, complications including but not limited bleeding, infection, damage to adjacent structures, sepsis, need for further procedures, sepsis, tumor recurrence to the patient in detail. Patient expressed verbal understanding. Written informed consent to be obtained in the preoperative period \par \par I have discussed the diagnosis, therapeutic plan and options with the patient at length. Patient expressed verbal understanding to proceed with proposed plan. All questions answered. \par

## 2023-07-13 NOTE — PHYSICAL EXAM
[Normal] : supple, no neck mass and thyroid not enlarged [Normal Neck Lymph Nodes] : normal neck lymph nodes  [Normal Supraclavicular Lymph Nodes] : normal supraclavicular lymph nodes [Normal Groin Lymph Nodes] : normal groin lymph nodes [Normal Axillary Lymph Nodes] : normal axillary lymph nodes [Normal] : oriented to person, place and time, with appropriate affect [FreeTextEntry1] : COVID-19 precautions as per [facility] policy was universally followed\par  [de-identified] : Abdomen soft, non-tender, non-distended, and no palpable masses.

## 2023-12-29 ENCOUNTER — INPATIENT (INPATIENT)
Facility: HOSPITAL | Age: 83
LOS: 3 days | Discharge: HOME | DRG: 808 | End: 2024-01-02
Attending: STUDENT IN AN ORGANIZED HEALTH CARE EDUCATION/TRAINING PROGRAM | Admitting: STUDENT IN AN ORGANIZED HEALTH CARE EDUCATION/TRAINING PROGRAM
Payer: MEDICARE

## 2023-12-29 VITALS
SYSTOLIC BLOOD PRESSURE: 107 MMHG | DIASTOLIC BLOOD PRESSURE: 69 MMHG | WEIGHT: 160.06 LBS | HEIGHT: 68 IN | RESPIRATION RATE: 18 BRPM | TEMPERATURE: 101 F | HEART RATE: 81 BPM | OXYGEN SATURATION: 95 %

## 2023-12-29 DIAGNOSIS — Z95.5 PRESENCE OF CORONARY ANGIOPLASTY IMPLANT AND GRAFT: Chronic | ICD-10-CM

## 2023-12-29 LAB
FLUAV AG NPH QL: SIGNIFICANT CHANGE UP
FLUAV AG NPH QL: SIGNIFICANT CHANGE UP
FLUBV AG NPH QL: SIGNIFICANT CHANGE UP
FLUBV AG NPH QL: SIGNIFICANT CHANGE UP
SARS-COV-2 RNA SPEC QL NAA+PROBE: SIGNIFICANT CHANGE UP
SARS-COV-2 RNA SPEC QL NAA+PROBE: SIGNIFICANT CHANGE UP

## 2023-12-29 PROCEDURE — 99285 EMERGENCY DEPT VISIT HI MDM: CPT

## 2023-12-29 NOTE — ED ADULT TRIAGE NOTE - TEMPERATURE IN CELSIUS (DEGREES C)
Group Topic: Coping Skills Education    Start Time: 3:00 PM  End Time: 4:00 PM    Focus: Reality Acceptance: Turning the Mind and Willingness  Number in attendance: 5    A presentation was given in the acceptance-based skills of turning the mind toward acceptance and away from rejecting reality and practicing willingness over willfulness.     Pt was an active participant in group discussion.     Attendance: Present  Patient Response: Appropriate feedback, Attentive and Interactive     38.4

## 2023-12-30 DIAGNOSIS — Z29.9 ENCOUNTER FOR PROPHYLACTIC MEASURES, UNSPECIFIED: ICD-10-CM

## 2023-12-30 DIAGNOSIS — I48.91 UNSPECIFIED ATRIAL FIBRILLATION: ICD-10-CM

## 2023-12-30 DIAGNOSIS — C25.9 MALIGNANT NEOPLASM OF PANCREAS, UNSPECIFIED: ICD-10-CM

## 2023-12-30 DIAGNOSIS — N17.9 ACUTE KIDNEY FAILURE, UNSPECIFIED: ICD-10-CM

## 2023-12-30 DIAGNOSIS — I10 ESSENTIAL (PRIMARY) HYPERTENSION: ICD-10-CM

## 2023-12-30 DIAGNOSIS — I25.10 ATHEROSCLEROTIC HEART DISEASE OF NATIVE CORONARY ARTERY WITHOUT ANGINA PECTORIS: ICD-10-CM

## 2023-12-30 DIAGNOSIS — E11.9 TYPE 2 DIABETES MELLITUS WITHOUT COMPLICATIONS: ICD-10-CM

## 2023-12-30 DIAGNOSIS — R07.9 CHEST PAIN, UNSPECIFIED: ICD-10-CM

## 2023-12-30 DIAGNOSIS — R79.89 OTHER SPECIFIED ABNORMAL FINDINGS OF BLOOD CHEMISTRY: ICD-10-CM

## 2023-12-30 DIAGNOSIS — D70.9 NEUTROPENIA, UNSPECIFIED: ICD-10-CM

## 2023-12-30 DIAGNOSIS — A41.9 SEPSIS, UNSPECIFIED ORGANISM: ICD-10-CM

## 2023-12-30 PROBLEM — I21.9 ACUTE MYOCARDIAL INFARCTION, UNSPECIFIED: Chronic | Status: ACTIVE | Noted: 2023-06-07

## 2023-12-30 PROBLEM — I21.9 ACUTE MYOCARDIAL INFARCTION, UNSPECIFIED: Chronic | Status: ACTIVE | Noted: 2022-09-16

## 2023-12-30 PROBLEM — E78.5 HYPERLIPIDEMIA, UNSPECIFIED: Chronic | Status: ACTIVE | Noted: 2023-06-07

## 2023-12-30 LAB
ALBUMIN SERPL ELPH-MCNC: 2 G/DL — LOW (ref 3.5–5)
ALBUMIN SERPL ELPH-MCNC: 2 G/DL — LOW (ref 3.5–5)
ALBUMIN SERPL ELPH-MCNC: 2.6 G/DL — LOW (ref 3.5–5)
ALBUMIN SERPL ELPH-MCNC: 2.6 G/DL — LOW (ref 3.5–5)
ALP SERPL-CCNC: 48 U/L — SIGNIFICANT CHANGE UP (ref 40–120)
ALP SERPL-CCNC: 48 U/L — SIGNIFICANT CHANGE UP (ref 40–120)
ALP SERPL-CCNC: 56 U/L — SIGNIFICANT CHANGE UP (ref 40–120)
ALP SERPL-CCNC: 56 U/L — SIGNIFICANT CHANGE UP (ref 40–120)
ALT FLD-CCNC: 14 U/L DA — SIGNIFICANT CHANGE UP (ref 10–60)
ALT FLD-CCNC: 14 U/L DA — SIGNIFICANT CHANGE UP (ref 10–60)
ALT FLD-CCNC: 17 U/L DA — SIGNIFICANT CHANGE UP (ref 10–60)
ALT FLD-CCNC: 17 U/L DA — SIGNIFICANT CHANGE UP (ref 10–60)
ANION GAP SERPL CALC-SCNC: 8 MMOL/L — SIGNIFICANT CHANGE UP (ref 5–17)
ANION GAP SERPL CALC-SCNC: 8 MMOL/L — SIGNIFICANT CHANGE UP (ref 5–17)
ANION GAP SERPL CALC-SCNC: 9 MMOL/L — SIGNIFICANT CHANGE UP (ref 5–17)
ANION GAP SERPL CALC-SCNC: 9 MMOL/L — SIGNIFICANT CHANGE UP (ref 5–17)
APPEARANCE UR: ABNORMAL
APPEARANCE UR: ABNORMAL
APTT BLD: 32.9 SEC — SIGNIFICANT CHANGE UP (ref 24.5–35.6)
APTT BLD: 32.9 SEC — SIGNIFICANT CHANGE UP (ref 24.5–35.6)
AST SERPL-CCNC: 8 U/L — LOW (ref 10–40)
BASOPHILS # BLD AUTO: 0 K/UL — SIGNIFICANT CHANGE UP (ref 0–0.2)
BASOPHILS NFR BLD AUTO: 0 % — SIGNIFICANT CHANGE UP (ref 0–2)
BILIRUB SERPL-MCNC: 0.6 MG/DL — SIGNIFICANT CHANGE UP (ref 0.2–1.2)
BILIRUB SERPL-MCNC: 0.6 MG/DL — SIGNIFICANT CHANGE UP (ref 0.2–1.2)
BILIRUB SERPL-MCNC: 0.7 MG/DL — SIGNIFICANT CHANGE UP (ref 0.2–1.2)
BILIRUB SERPL-MCNC: 0.7 MG/DL — SIGNIFICANT CHANGE UP (ref 0.2–1.2)
BILIRUB UR-MCNC: NEGATIVE — SIGNIFICANT CHANGE UP
BILIRUB UR-MCNC: NEGATIVE — SIGNIFICANT CHANGE UP
BUN SERPL-MCNC: 25 MG/DL — HIGH (ref 7–18)
BUN SERPL-MCNC: 25 MG/DL — HIGH (ref 7–18)
BUN SERPL-MCNC: 27 MG/DL — HIGH (ref 7–18)
BUN SERPL-MCNC: 27 MG/DL — HIGH (ref 7–18)
CALCIUM SERPL-MCNC: 6.7 MG/DL — LOW (ref 8.4–10.5)
CALCIUM SERPL-MCNC: 6.7 MG/DL — LOW (ref 8.4–10.5)
CALCIUM SERPL-MCNC: 7.4 MG/DL — LOW (ref 8.4–10.5)
CALCIUM SERPL-MCNC: 7.4 MG/DL — LOW (ref 8.4–10.5)
CHLORIDE SERPL-SCNC: 102 MMOL/L — SIGNIFICANT CHANGE UP (ref 96–108)
CHLORIDE SERPL-SCNC: 102 MMOL/L — SIGNIFICANT CHANGE UP (ref 96–108)
CHLORIDE SERPL-SCNC: 107 MMOL/L — SIGNIFICANT CHANGE UP (ref 96–108)
CHLORIDE SERPL-SCNC: 107 MMOL/L — SIGNIFICANT CHANGE UP (ref 96–108)
CO2 SERPL-SCNC: 20 MMOL/L — LOW (ref 22–31)
CO2 SERPL-SCNC: 20 MMOL/L — LOW (ref 22–31)
CO2 SERPL-SCNC: 22 MMOL/L — SIGNIFICANT CHANGE UP (ref 22–31)
CO2 SERPL-SCNC: 22 MMOL/L — SIGNIFICANT CHANGE UP (ref 22–31)
COLOR SPEC: YELLOW — SIGNIFICANT CHANGE UP
COLOR SPEC: YELLOW — SIGNIFICANT CHANGE UP
CREAT ?TM UR-MCNC: 126 MG/DL — SIGNIFICANT CHANGE UP
CREAT ?TM UR-MCNC: 126 MG/DL — SIGNIFICANT CHANGE UP
CREAT SERPL-MCNC: 1.27 MG/DL — SIGNIFICANT CHANGE UP (ref 0.5–1.3)
CREAT SERPL-MCNC: 1.27 MG/DL — SIGNIFICANT CHANGE UP (ref 0.5–1.3)
CREAT SERPL-MCNC: 1.57 MG/DL — HIGH (ref 0.5–1.3)
CREAT SERPL-MCNC: 1.57 MG/DL — HIGH (ref 0.5–1.3)
DIFF PNL FLD: NEGATIVE — SIGNIFICANT CHANGE UP
DIFF PNL FLD: NEGATIVE — SIGNIFICANT CHANGE UP
EGFR: 43 ML/MIN/1.73M2 — LOW
EGFR: 43 ML/MIN/1.73M2 — LOW
EGFR: 56 ML/MIN/1.73M2 — LOW
EGFR: 56 ML/MIN/1.73M2 — LOW
EOSINOPHIL # BLD AUTO: 0 K/UL — SIGNIFICANT CHANGE UP (ref 0–0.5)
EOSINOPHIL # BLD AUTO: 0 K/UL — SIGNIFICANT CHANGE UP (ref 0–0.5)
EOSINOPHIL # BLD AUTO: 0.01 K/UL — SIGNIFICANT CHANGE UP (ref 0–0.5)
EOSINOPHIL # BLD AUTO: 0.01 K/UL — SIGNIFICANT CHANGE UP (ref 0–0.5)
EOSINOPHIL NFR BLD AUTO: 0 % — SIGNIFICANT CHANGE UP (ref 0–6)
EOSINOPHIL NFR BLD AUTO: 0 % — SIGNIFICANT CHANGE UP (ref 0–6)
EOSINOPHIL NFR BLD AUTO: 1 % — SIGNIFICANT CHANGE UP (ref 0–6)
EOSINOPHIL NFR BLD AUTO: 1 % — SIGNIFICANT CHANGE UP (ref 0–6)
GLUCOSE BLDC GLUCOMTR-MCNC: 109 MG/DL — HIGH (ref 70–99)
GLUCOSE BLDC GLUCOMTR-MCNC: 109 MG/DL — HIGH (ref 70–99)
GLUCOSE BLDC GLUCOMTR-MCNC: 145 MG/DL — HIGH (ref 70–99)
GLUCOSE BLDC GLUCOMTR-MCNC: 145 MG/DL — HIGH (ref 70–99)
GLUCOSE BLDC GLUCOMTR-MCNC: 156 MG/DL — HIGH (ref 70–99)
GLUCOSE BLDC GLUCOMTR-MCNC: 156 MG/DL — HIGH (ref 70–99)
GLUCOSE BLDC GLUCOMTR-MCNC: 172 MG/DL — HIGH (ref 70–99)
GLUCOSE BLDC GLUCOMTR-MCNC: 172 MG/DL — HIGH (ref 70–99)
GLUCOSE SERPL-MCNC: 139 MG/DL — HIGH (ref 70–99)
GLUCOSE SERPL-MCNC: 139 MG/DL — HIGH (ref 70–99)
GLUCOSE SERPL-MCNC: 187 MG/DL — HIGH (ref 70–99)
GLUCOSE SERPL-MCNC: 187 MG/DL — HIGH (ref 70–99)
GLUCOSE UR QL: NEGATIVE MG/DL — SIGNIFICANT CHANGE UP
GLUCOSE UR QL: NEGATIVE MG/DL — SIGNIFICANT CHANGE UP
HCT VFR BLD CALC: 23.5 % — LOW (ref 39–50)
HCT VFR BLD CALC: 23.5 % — LOW (ref 39–50)
HCT VFR BLD CALC: 25.1 % — LOW (ref 39–50)
HCT VFR BLD CALC: 25.1 % — LOW (ref 39–50)
HGB BLD-MCNC: 8.1 G/DL — LOW (ref 13–17)
HGB BLD-MCNC: 8.1 G/DL — LOW (ref 13–17)
HGB BLD-MCNC: 8.9 G/DL — LOW (ref 13–17)
HGB BLD-MCNC: 8.9 G/DL — LOW (ref 13–17)
IMM GRANULOCYTES NFR BLD AUTO: 0 % — SIGNIFICANT CHANGE UP (ref 0–0.9)
IMM GRANULOCYTES NFR BLD AUTO: 0 % — SIGNIFICANT CHANGE UP (ref 0–0.9)
INR BLD: 1.77 RATIO — HIGH (ref 0.85–1.18)
INR BLD: 1.77 RATIO — HIGH (ref 0.85–1.18)
KETONES UR-MCNC: ABNORMAL MG/DL
KETONES UR-MCNC: ABNORMAL MG/DL
LACTATE SERPL-SCNC: 2.5 MMOL/L — HIGH (ref 0.7–2)
LACTATE SERPL-SCNC: 3.3 MMOL/L — HIGH (ref 0.7–2)
LACTATE SERPL-SCNC: 3.3 MMOL/L — HIGH (ref 0.7–2)
LEUKOCYTE ESTERASE UR-ACNC: NEGATIVE — SIGNIFICANT CHANGE UP
LEUKOCYTE ESTERASE UR-ACNC: NEGATIVE — SIGNIFICANT CHANGE UP
LIDOCAIN IGE QN: 9 U/L — LOW (ref 13–75)
LIDOCAIN IGE QN: 9 U/L — LOW (ref 13–75)
LYMPHOCYTES # BLD AUTO: 0.3 K/UL — LOW (ref 1–3.3)
LYMPHOCYTES # BLD AUTO: 0.3 K/UL — LOW (ref 1–3.3)
LYMPHOCYTES # BLD AUTO: 0.71 K/UL — LOW (ref 1–3.3)
LYMPHOCYTES # BLD AUTO: 0.71 K/UL — LOW (ref 1–3.3)
LYMPHOCYTES # BLD AUTO: 29.4 % — SIGNIFICANT CHANGE UP (ref 13–44)
LYMPHOCYTES # BLD AUTO: 29.4 % — SIGNIFICANT CHANGE UP (ref 13–44)
LYMPHOCYTES # BLD AUTO: 45.5 % — HIGH (ref 13–44)
LYMPHOCYTES # BLD AUTO: 45.5 % — HIGH (ref 13–44)
MAGNESIUM SERPL-MCNC: 1.2 MG/DL — LOW (ref 1.6–2.6)
MAGNESIUM SERPL-MCNC: 1.2 MG/DL — LOW (ref 1.6–2.6)
MAGNESIUM SERPL-MCNC: 1.4 MG/DL — LOW (ref 1.6–2.6)
MAGNESIUM SERPL-MCNC: 1.4 MG/DL — LOW (ref 1.6–2.6)
MCHC RBC-ENTMCNC: 28.7 PG — SIGNIFICANT CHANGE UP (ref 27–34)
MCHC RBC-ENTMCNC: 28.7 PG — SIGNIFICANT CHANGE UP (ref 27–34)
MCHC RBC-ENTMCNC: 29.1 PG — SIGNIFICANT CHANGE UP (ref 27–34)
MCHC RBC-ENTMCNC: 29.1 PG — SIGNIFICANT CHANGE UP (ref 27–34)
MCHC RBC-ENTMCNC: 34.5 GM/DL — SIGNIFICANT CHANGE UP (ref 32–36)
MCHC RBC-ENTMCNC: 34.5 GM/DL — SIGNIFICANT CHANGE UP (ref 32–36)
MCHC RBC-ENTMCNC: 35.5 GM/DL — SIGNIFICANT CHANGE UP (ref 32–36)
MCHC RBC-ENTMCNC: 35.5 GM/DL — SIGNIFICANT CHANGE UP (ref 32–36)
MCV RBC AUTO: 82 FL — SIGNIFICANT CHANGE UP (ref 80–100)
MCV RBC AUTO: 82 FL — SIGNIFICANT CHANGE UP (ref 80–100)
MCV RBC AUTO: 83.3 FL — SIGNIFICANT CHANGE UP (ref 80–100)
MCV RBC AUTO: 83.3 FL — SIGNIFICANT CHANGE UP (ref 80–100)
MONOCYTES # BLD AUTO: 0.3 K/UL — SIGNIFICANT CHANGE UP (ref 0–0.9)
MONOCYTES # BLD AUTO: 0.3 K/UL — SIGNIFICANT CHANGE UP (ref 0–0.9)
MONOCYTES # BLD AUTO: 0.38 K/UL — SIGNIFICANT CHANGE UP (ref 0–0.9)
MONOCYTES # BLD AUTO: 0.38 K/UL — SIGNIFICANT CHANGE UP (ref 0–0.9)
MONOCYTES NFR BLD AUTO: 24.2 % — HIGH (ref 2–14)
MONOCYTES NFR BLD AUTO: 24.2 % — HIGH (ref 2–14)
MONOCYTES NFR BLD AUTO: 29.4 % — HIGH (ref 2–14)
MONOCYTES NFR BLD AUTO: 29.4 % — HIGH (ref 2–14)
MRSA PCR RESULT.: SIGNIFICANT CHANGE UP
MRSA PCR RESULT.: SIGNIFICANT CHANGE UP
NEUTROPHILS # BLD AUTO: 0.41 K/UL — LOW (ref 1.8–7.4)
NEUTROPHILS # BLD AUTO: 0.41 K/UL — LOW (ref 1.8–7.4)
NEUTROPHILS # BLD AUTO: 0.47 K/UL — LOW (ref 1.8–7.4)
NEUTROPHILS # BLD AUTO: 0.47 K/UL — LOW (ref 1.8–7.4)
NEUTROPHILS NFR BLD AUTO: 28.8 % — LOW (ref 43–77)
NEUTROPHILS NFR BLD AUTO: 28.8 % — LOW (ref 43–77)
NEUTROPHILS NFR BLD AUTO: 40.2 % — LOW (ref 43–77)
NEUTROPHILS NFR BLD AUTO: 40.2 % — LOW (ref 43–77)
NITRITE UR-MCNC: NEGATIVE — SIGNIFICANT CHANGE UP
NITRITE UR-MCNC: NEGATIVE — SIGNIFICANT CHANGE UP
NRBC # BLD: 0 /100 WBCS — SIGNIFICANT CHANGE UP (ref 0–0)
NRBC # BLD: 0 /100 WBCS — SIGNIFICANT CHANGE UP (ref 0–0)
PH UR: 5.5 — SIGNIFICANT CHANGE UP (ref 5–8)
PH UR: 5.5 — SIGNIFICANT CHANGE UP (ref 5–8)
PHOSPHATE SERPL-MCNC: 2 MG/DL — LOW (ref 2.5–4.5)
PHOSPHATE SERPL-MCNC: 2 MG/DL — LOW (ref 2.5–4.5)
PLATELET # BLD AUTO: 153 K/UL — SIGNIFICANT CHANGE UP (ref 150–400)
PLATELET # BLD AUTO: 153 K/UL — SIGNIFICANT CHANGE UP (ref 150–400)
PLATELET # BLD AUTO: 169 K/UL — SIGNIFICANT CHANGE UP (ref 150–400)
PLATELET # BLD AUTO: 169 K/UL — SIGNIFICANT CHANGE UP (ref 150–400)
POTASSIUM SERPL-MCNC: 3.1 MMOL/L — LOW (ref 3.5–5.3)
POTASSIUM SERPL-MCNC: 3.1 MMOL/L — LOW (ref 3.5–5.3)
POTASSIUM SERPL-MCNC: 3.6 MMOL/L — SIGNIFICANT CHANGE UP (ref 3.5–5.3)
POTASSIUM SERPL-MCNC: 3.6 MMOL/L — SIGNIFICANT CHANGE UP (ref 3.5–5.3)
POTASSIUM SERPL-SCNC: 3.1 MMOL/L — LOW (ref 3.5–5.3)
POTASSIUM SERPL-SCNC: 3.1 MMOL/L — LOW (ref 3.5–5.3)
POTASSIUM SERPL-SCNC: 3.6 MMOL/L — SIGNIFICANT CHANGE UP (ref 3.5–5.3)
POTASSIUM SERPL-SCNC: 3.6 MMOL/L — SIGNIFICANT CHANGE UP (ref 3.5–5.3)
PROT SERPL-MCNC: 4.8 G/DL — LOW (ref 6–8.3)
PROT SERPL-MCNC: 4.8 G/DL — LOW (ref 6–8.3)
PROT SERPL-MCNC: 5.9 G/DL — LOW (ref 6–8.3)
PROT SERPL-MCNC: 5.9 G/DL — LOW (ref 6–8.3)
PROT UR-MCNC: 30 MG/DL
PROT UR-MCNC: 30 MG/DL
PROTHROM AB SERPL-ACNC: 19.8 SEC — HIGH (ref 9.5–13)
PROTHROM AB SERPL-ACNC: 19.8 SEC — HIGH (ref 9.5–13)
RAPID RVP RESULT: SIGNIFICANT CHANGE UP
RAPID RVP RESULT: SIGNIFICANT CHANGE UP
RBC # BLD: 2.82 M/UL — LOW (ref 4.2–5.8)
RBC # BLD: 2.82 M/UL — LOW (ref 4.2–5.8)
RBC # BLD: 3.06 M/UL — LOW (ref 4.2–5.8)
RBC # BLD: 3.06 M/UL — LOW (ref 4.2–5.8)
RBC # FLD: 14.6 % — HIGH (ref 10.3–14.5)
S AUREUS DNA NOSE QL NAA+PROBE: SIGNIFICANT CHANGE UP
S AUREUS DNA NOSE QL NAA+PROBE: SIGNIFICANT CHANGE UP
SARS-COV-2 RNA SPEC QL NAA+PROBE: SIGNIFICANT CHANGE UP
SARS-COV-2 RNA SPEC QL NAA+PROBE: SIGNIFICANT CHANGE UP
SODIUM SERPL-SCNC: 133 MMOL/L — LOW (ref 135–145)
SODIUM SERPL-SCNC: 133 MMOL/L — LOW (ref 135–145)
SODIUM SERPL-SCNC: 135 MMOL/L — SIGNIFICANT CHANGE UP (ref 135–145)
SODIUM SERPL-SCNC: 135 MMOL/L — SIGNIFICANT CHANGE UP (ref 135–145)
SODIUM UR-SCNC: 21 MMOL/L — SIGNIFICANT CHANGE UP
SODIUM UR-SCNC: 21 MMOL/L — SIGNIFICANT CHANGE UP
SP GR SPEC: 1.02 — SIGNIFICANT CHANGE UP (ref 1–1.03)
SP GR SPEC: 1.02 — SIGNIFICANT CHANGE UP (ref 1–1.03)
TROPONIN I, HIGH SENSITIVITY RESULT: 14.2 NG/L — SIGNIFICANT CHANGE UP
TROPONIN I, HIGH SENSITIVITY RESULT: 14.2 NG/L — SIGNIFICANT CHANGE UP
UROBILINOGEN FLD QL: 0.2 MG/DL — SIGNIFICANT CHANGE UP (ref 0.2–1)
UROBILINOGEN FLD QL: 0.2 MG/DL — SIGNIFICANT CHANGE UP (ref 0.2–1)
WBC # BLD: 1.02 K/UL — LOW (ref 3.8–10.5)
WBC # BLD: 1.02 K/UL — LOW (ref 3.8–10.5)
WBC # BLD: 1.56 K/UL — LOW (ref 3.8–10.5)
WBC # BLD: 1.56 K/UL — LOW (ref 3.8–10.5)
WBC # FLD AUTO: 1.02 K/UL — LOW (ref 3.8–10.5)
WBC # FLD AUTO: 1.02 K/UL — LOW (ref 3.8–10.5)
WBC # FLD AUTO: 1.56 K/UL — LOW (ref 3.8–10.5)
WBC # FLD AUTO: 1.56 K/UL — LOW (ref 3.8–10.5)

## 2023-12-30 PROCEDURE — 71045 X-RAY EXAM CHEST 1 VIEW: CPT | Mod: 26

## 2023-12-30 PROCEDURE — 99223 1ST HOSP IP/OBS HIGH 75: CPT | Mod: GC

## 2023-12-30 RX ORDER — PANTOPRAZOLE SODIUM 20 MG/1
1 TABLET, DELAYED RELEASE ORAL
Refills: 0 | DISCHARGE

## 2023-12-30 RX ORDER — INSULIN LISPRO 100/ML
VIAL (ML) SUBCUTANEOUS AT BEDTIME
Refills: 0 | Status: DISCONTINUED | OUTPATIENT
Start: 2023-12-30 | End: 2024-01-02

## 2023-12-30 RX ORDER — POTASSIUM PHOSPHATE, MONOBASIC POTASSIUM PHOSPHATE, DIBASIC 236; 224 MG/ML; MG/ML
30 INJECTION, SOLUTION INTRAVENOUS ONCE
Refills: 0 | Status: COMPLETED | OUTPATIENT
Start: 2023-12-30 | End: 2023-12-30

## 2023-12-30 RX ORDER — INSULIN LISPRO 100/ML
VIAL (ML) SUBCUTANEOUS
Refills: 0 | Status: DISCONTINUED | OUTPATIENT
Start: 2023-12-30 | End: 2024-01-02

## 2023-12-30 RX ORDER — INFLUENZA VIRUS VACCINE 15; 15; 15; 15 UG/.5ML; UG/.5ML; UG/.5ML; UG/.5ML
0.7 SUSPENSION INTRAMUSCULAR ONCE
Refills: 0 | Status: DISCONTINUED | OUTPATIENT
Start: 2023-12-30 | End: 2024-01-02

## 2023-12-30 RX ORDER — CEFEPIME 1 G/1
2000 INJECTION, POWDER, FOR SOLUTION INTRAMUSCULAR; INTRAVENOUS EVERY 12 HOURS
Refills: 0 | Status: DISCONTINUED | OUTPATIENT
Start: 2023-12-30 | End: 2024-01-02

## 2023-12-30 RX ORDER — VANCOMYCIN HCL 1 G
1000 VIAL (EA) INTRAVENOUS ONCE
Refills: 0 | Status: COMPLETED | OUTPATIENT
Start: 2023-12-30 | End: 2023-12-30

## 2023-12-30 RX ORDER — SODIUM CHLORIDE 9 MG/ML
1000 INJECTION, SOLUTION INTRAVENOUS ONCE
Refills: 0 | Status: COMPLETED | OUTPATIENT
Start: 2023-12-30 | End: 2023-12-30

## 2023-12-30 RX ORDER — SODIUM CHLORIDE 9 MG/ML
1000 INJECTION INTRAMUSCULAR; INTRAVENOUS; SUBCUTANEOUS ONCE
Refills: 0 | Status: DISCONTINUED | OUTPATIENT
Start: 2023-12-30 | End: 2023-12-30

## 2023-12-30 RX ORDER — ERGOCALCIFEROL 1.25 MG/1
1 CAPSULE ORAL
Qty: 0 | Refills: 0 | DISCHARGE

## 2023-12-30 RX ORDER — LIPASE/PROTEASE/AMYLASE 16-48-48K
1 CAPSULE,DELAYED RELEASE (ENTERIC COATED) ORAL
Refills: 0 | Status: DISCONTINUED | OUTPATIENT
Start: 2023-12-30 | End: 2024-01-02

## 2023-12-30 RX ORDER — CEFTRIAXONE 500 MG/1
1000 INJECTION, POWDER, FOR SOLUTION INTRAMUSCULAR; INTRAVENOUS ONCE
Refills: 0 | Status: COMPLETED | OUTPATIENT
Start: 2023-12-30 | End: 2023-12-30

## 2023-12-30 RX ORDER — ACETAMINOPHEN 500 MG
650 TABLET ORAL EVERY 6 HOURS
Refills: 0 | Status: DISCONTINUED | OUTPATIENT
Start: 2023-12-30 | End: 2024-01-02

## 2023-12-30 RX ORDER — APIXABAN 2.5 MG/1
1 TABLET, FILM COATED ORAL
Qty: 0 | Refills: 0 | DISCHARGE

## 2023-12-30 RX ORDER — KETOROLAC TROMETHAMINE 30 MG/ML
15 SYRINGE (ML) INJECTION ONCE
Refills: 0 | Status: DISCONTINUED | OUTPATIENT
Start: 2023-12-30 | End: 2023-12-30

## 2023-12-30 RX ORDER — METFORMIN HYDROCHLORIDE 850 MG/1
1 TABLET ORAL
Qty: 0 | Refills: 0 | DISCHARGE

## 2023-12-30 RX ORDER — SODIUM CHLORIDE 9 MG/ML
2000 INJECTION INTRAMUSCULAR; INTRAVENOUS; SUBCUTANEOUS ONCE
Refills: 0 | Status: COMPLETED | OUTPATIENT
Start: 2023-12-30 | End: 2023-12-30

## 2023-12-30 RX ORDER — POTASSIUM CHLORIDE 20 MEQ
40 PACKET (EA) ORAL ONCE
Refills: 0 | Status: COMPLETED | OUTPATIENT
Start: 2023-12-30 | End: 2023-12-30

## 2023-12-30 RX ORDER — ESCITALOPRAM OXALATE 10 MG/1
10 TABLET, FILM COATED ORAL DAILY
Refills: 0 | Status: DISCONTINUED | OUTPATIENT
Start: 2023-12-30 | End: 2024-01-02

## 2023-12-30 RX ORDER — PANTOPRAZOLE SODIUM 20 MG/1
40 TABLET, DELAYED RELEASE ORAL
Refills: 0 | Status: DISCONTINUED | OUTPATIENT
Start: 2023-12-30 | End: 2023-12-31

## 2023-12-30 RX ORDER — ACETAMINOPHEN 500 MG
2 TABLET ORAL
Qty: 0 | Refills: 0 | DISCHARGE

## 2023-12-30 RX ORDER — ACETAMINOPHEN 500 MG
1000 TABLET ORAL ONCE
Refills: 0 | Status: COMPLETED | OUTPATIENT
Start: 2023-12-30 | End: 2023-12-30

## 2023-12-30 RX ORDER — APIXABAN 2.5 MG/1
2.5 TABLET, FILM COATED ORAL EVERY 12 HOURS
Refills: 0 | Status: DISCONTINUED | OUTPATIENT
Start: 2023-12-30 | End: 2024-01-02

## 2023-12-30 RX ORDER — MORPHINE SULFATE 50 MG/1
2 CAPSULE, EXTENDED RELEASE ORAL ONCE
Refills: 0 | Status: DISCONTINUED | OUTPATIENT
Start: 2023-12-30 | End: 2023-12-30

## 2023-12-30 RX ORDER — RANOLAZINE 500 MG/1
1 TABLET, FILM COATED, EXTENDED RELEASE ORAL
Qty: 0 | Refills: 0 | DISCHARGE

## 2023-12-30 RX ORDER — ONDANSETRON 8 MG/1
4 TABLET, FILM COATED ORAL EVERY 8 HOURS
Refills: 0 | Status: DISCONTINUED | OUTPATIENT
Start: 2023-12-30 | End: 2024-01-02

## 2023-12-30 RX ORDER — VANCOMYCIN HCL 1 G
1000 VIAL (EA) INTRAVENOUS EVERY 24 HOURS
Refills: 0 | Status: DISCONTINUED | OUTPATIENT
Start: 2023-12-30 | End: 2023-12-31

## 2023-12-30 RX ORDER — MAGNESIUM SULFATE 500 MG/ML
1 VIAL (ML) INJECTION ONCE
Refills: 0 | Status: COMPLETED | OUTPATIENT
Start: 2023-12-30 | End: 2023-12-30

## 2023-12-30 RX ORDER — ESCITALOPRAM OXALATE 10 MG/1
1 TABLET, FILM COATED ORAL
Refills: 0 | DISCHARGE

## 2023-12-30 RX ORDER — SODIUM CHLORIDE 9 MG/ML
1000 INJECTION INTRAMUSCULAR; INTRAVENOUS; SUBCUTANEOUS
Refills: 0 | Status: DISCONTINUED | OUTPATIENT
Start: 2023-12-30 | End: 2023-12-31

## 2023-12-30 RX ORDER — LIPASE/PROTEASE/AMYLASE 16-48-48K
1 CAPSULE,DELAYED RELEASE (ENTERIC COATED) ORAL
Refills: 0 | DISCHARGE

## 2023-12-30 RX ADMIN — CEFEPIME 100 MILLIGRAM(S): 1 INJECTION, POWDER, FOR SOLUTION INTRAMUSCULAR; INTRAVENOUS at 20:36

## 2023-12-30 RX ADMIN — Medication 400 MILLIGRAM(S): at 02:29

## 2023-12-30 RX ADMIN — Medication 250 MILLIGRAM(S): at 19:10

## 2023-12-30 RX ADMIN — CEFTRIAXONE 100 MILLIGRAM(S): 500 INJECTION, POWDER, FOR SOLUTION INTRAMUSCULAR; INTRAVENOUS at 03:12

## 2023-12-30 RX ADMIN — Medication 15 MILLIGRAM(S): at 02:29

## 2023-12-30 RX ADMIN — Medication 650 MILLIGRAM(S): at 11:51

## 2023-12-30 RX ADMIN — SODIUM CHLORIDE 2000 MILLILITER(S): 9 INJECTION INTRAMUSCULAR; INTRAVENOUS; SUBCUTANEOUS at 02:29

## 2023-12-30 RX ADMIN — Medication 650 MILLIGRAM(S): at 11:30

## 2023-12-30 RX ADMIN — MORPHINE SULFATE 2 MILLIGRAM(S): 50 CAPSULE, EXTENDED RELEASE ORAL at 11:51

## 2023-12-30 RX ADMIN — SODIUM CHLORIDE 80 MILLILITER(S): 9 INJECTION INTRAMUSCULAR; INTRAVENOUS; SUBCUTANEOUS at 11:32

## 2023-12-30 RX ADMIN — APIXABAN 2.5 MILLIGRAM(S): 2.5 TABLET, FILM COATED ORAL at 19:10

## 2023-12-30 RX ADMIN — SODIUM CHLORIDE 80 MILLILITER(S): 9 INJECTION INTRAMUSCULAR; INTRAVENOUS; SUBCUTANEOUS at 20:38

## 2023-12-30 RX ADMIN — Medication 1 CAPSULE(S): at 10:18

## 2023-12-30 RX ADMIN — POTASSIUM PHOSPHATE, MONOBASIC POTASSIUM PHOSPHATE, DIBASIC 83.33 MILLIMOLE(S): 236; 224 INJECTION, SOLUTION INTRAVENOUS at 12:07

## 2023-12-30 RX ADMIN — Medication 250 MILLIGRAM(S): at 04:06

## 2023-12-30 RX ADMIN — Medication 40 MILLIEQUIVALENT(S): at 18:26

## 2023-12-30 RX ADMIN — MORPHINE SULFATE 2 MILLIGRAM(S): 50 CAPSULE, EXTENDED RELEASE ORAL at 11:30

## 2023-12-30 RX ADMIN — Medication 100 GRAM(S): at 10:55

## 2023-12-30 RX ADMIN — Medication 1 CAPSULE(S): at 19:10

## 2023-12-30 RX ADMIN — SODIUM CHLORIDE 1000 MILLILITER(S): 9 INJECTION, SOLUTION INTRAVENOUS at 08:38

## 2023-12-30 NOTE — H&P ADULT - HISTORY OF PRESENT ILLNESS
This is an 84 y/o M, from home (HHA 12h/5d), ambulates independently, with PMHx of Pancreatic CA with lung mets (last chemo 12/19), DM, HTN, CAD, and Afib on Eliquis who presents with fever. Pt states he has been feeling chest pain, while pointing at his epigastric area. Per son, pt has had this complaint for 3 days and was evaluated by a doctor who said it was likely heartburn. Son states pt had a temperature of 39C at home along with 2-3 days of not eating or drinking normally. Son also states his father cannot walk without help and has comlpained of occasional shortness of breath. Pt has chronic dizziness per son. Pt denies any sick contacts, headaches, N/V/D, constipation, numbness or tingling.  This is an 82 y/o M, from home (HHA 12h/5d), ambulates independently, with PMHx of Pancreatic CA with lung mets (last chemo 12/19), DM, HTN, CAD, and Afib on Eliquis who presents with fever. Pt states he has been feeling chest pain, while pointing at his epigastric area. Per son, pt has had this complaint for 3 days and was evaluated by a doctor who said it was likely heartburn. Son states pt had a temperature of 39C at home along with 2-3 days of not eating or drinking normally. Son also states his father cannot walk without help and has comlpained of occasional shortness of breath. Pt has chronic dizziness per son. Pt denies any sick contacts, headaches, N/V/D, constipation, numbness or tingling.

## 2023-12-30 NOTE — H&P ADULT - TIME BILLING
Reviewing charts, labs, imaging, corresponding with consult physicians if needed, gathering data from the patient and communicating the assessment and plan.

## 2023-12-30 NOTE — PATIENT PROFILE ADULT - FALL HARM RISK - HARM RISK INTERVENTIONS
Assistance with ambulation/Assistance OOB with selected safe patient handling equipment/Communicate Risk of Fall with Harm to all staff/Discuss with provider need for PT consult/Monitor gait and stability/Provide patient with walking aids - walker, cane, crutches/Reinforce activity limits and safety measures with patient and family/Tailored Fall Risk Interventions/Visual Cue: Yellow wristband and red socks/Bed in lowest position, wheels locked, appropriate side rails in place/Call bell, personal items and telephone in reach/Instruct patient to call for assistance before getting out of bed or chair/Non-slip footwear when patient is out of bed/[place] to call system/Physically safe environment - no spills, clutter or unnecessary equipment/Purposeful Proactive Rounding/Room/bathroom lighting operational, light cord in reach Assistance with ambulation/Assistance OOB with selected safe patient handling equipment/Communicate Risk of Fall with Harm to all staff/Discuss with provider need for PT consult/Monitor gait and stability/Provide patient with walking aids - walker, cane, crutches/Reinforce activity limits and safety measures with patient and family/Tailored Fall Risk Interventions/Visual Cue: Yellow wristband and red socks/Bed in lowest position, wheels locked, appropriate side rails in place/Call bell, personal items and telephone in reach/Instruct patient to call for assistance before getting out of bed or chair/Non-slip footwear when patient is out of bed/[place] to call system/Physically safe environment - no spills, clutter or unnecessary equipment/Purposeful Proactive Rounding/Room/bathroom lighting operational, light cord in reach

## 2023-12-30 NOTE — ED PROVIDER NOTE - CLINICAL SUMMARY MEDICAL DECISION MAKING FREE TEXT BOX
83 year old male with fever/weakness. PE as above.  labs, cxr, fluids, abx, flu/covid swab, reassess

## 2023-12-30 NOTE — ED PROVIDER NOTE - NSICDXPASTMEDICALHX_GEN_ALL_CORE_FT
PAST MEDICAL HISTORY:  CAD (coronary artery disease)     Diabetes     Diabetes     DM (diabetes mellitus)     Former smoker     Gastritis     Hepatomegaly     HTN (hypertension)     HTN (hypertension)     HTN (hypertension)     Hyperlipemia     MI (myocardial infarction)     MI (myocardial infarction)     MI (myocardial infarction)     Myocardial infarct     PUD (peptic ulcer disease) remote

## 2023-12-30 NOTE — H&P ADULT - PROBLEM SELECTOR PLAN 8
Pt has a history of Afib, takes Eliquis and metoprolol succinate at home  - C/w Eliquis and metoprolol Pt has a history of Afib, takes Eliquis at home  - C/w Eliquis

## 2023-12-30 NOTE — H&P ADULT - ATTENDING COMMENTS
[place]  Bella ID# [id number]  Patient is an 84 y/o male with PMHx of Pancreatic CA with lung mets (last chemo 12/19), T2DM, HTN, CAD, and Afib on Eliquis who presents with fever and chest pain x 3 days. His son reports that pt spiked a temperature of 102.2F at home and has also been having reduced PO intake, shortness of breath on exertion, and dizziness. His last chemo (unclear regimen) was 12/26 and he has been having progressive generalized weakness since then. He denies any headaches, visual disturbances, N/V/D, dizziness, falls, palpitations, lower extremity swelling, skin rash, recent travel, or sick contacts    VITALS   T(C): 36.7 (12-30-23 @ 06:02), Max: 38.4 (12-29-23 @ 22:37)  HR: 72 (12-30-23 @ 06:02) (72 - 81)  BP: 102/55 (12-30-23 @ 06:02) (96/56 - 107/69)  RR: 18 (12-30-23 @ 06:02) (18 - 18)  SpO2: 98% (12-30-23 @ 06:02) (95% - 98%)12-30-23 @ 07:18    GENERAL APPEARANCE: Well developed, AA0x2-3, in NAD   HEENT: Normocephalic, PERRL, EOMI  CARDIAC: Normal S1 and S2. No S3, S4 or murmurs. Rhythm is regular. There is trace peripheral edema, without cyanosis or pallor.  LUNGS: Clear to auscultation and percussion without rales, rhonchi, wheezing or diminished breath sounds.  ABDOMEN: Positive bowel sounds. Soft, nondistended, nontender. No guarding or rebound. No masses.  EXTREMITIES: No significant deformity or joint abnormality. No edema. Peripheral pulses intact.   NEUROLOGICAL: CN II-XII intact. Unable to perform full exam.   SKIN: No skin breakdown, lesions or rashes noted     PERTINENT LABS   Hemoglobin: 8.9 g/dL (12-30-23 @ 02:30)  Hematocrit: 25.1 % (12-30-23 @ 02:30)  Platelet Count - Automated: 169 K/uL (12-30-23 @ 02:30)  Sodium: 133 mmol/L (12-30-23 @ 02:30)  Potassium: 3.6 mmol/L (12-30-23 @ 02:30)  Chloride: 102 mmol/L (12-30-23 @ 02:30)  Carbon Dioxide: 22 mmol/L (12-30-23 @ 02:30)  Glucose: 187 mg/dL (12-30-23 @ 02:30)  Blood Urea Nitrogen: 27 mg/dL (12-30-23 @ 02:30)  Albumin: 2.6 g/dL (12-30-23 @ 02:30)  Creatinine: 1.57 mg/dL (12-30-23 @ 02:30)  Aspartate Aminotransferase (AST/SGOT): 8 U/L (12-30-23 @ 02:30)  Alanine Aminotransferase (ALT/SGPT): 17 U/L DA (12-30-23 @ 02:30)  Alkaline Phosphatase: 56 U/L (12-30-23 @ 02:30)    EKG reviewed: NSR   Pertinent Imaging:  CXR without focal consolidation; cephalization +    ASSESSMENT AND PLAN:  # Severe Neutropenic Sepsis   # Metastatic Pancreatic Cancer  # Acute Kidney Injury   # T2DM   # HTN    # Protein Energy Malnutrition     Patient is an 84 y/o Immunocompromised male on active chemotherapy presenting with neutropenia and fevers; will cover with Cefepime and Vancomycin. S/p 3L IVF, BP still borderline at 96/56 mmHg but patient appears able to tolerate more fluid hydration. Will trend down lactate and f/u sepsis w/u to localize source; CXR non-focal. GOC addressed at length with son who wants to discuss with other family before making any decisions but leans toward DNR/DNI. Chest pain is unlikely ACS; ruled out with negative trop and EKG. C/w home meds except anti-hypertensives in setting of sepsis and OHAs; c/w insulin HSS. Would appreciate ID, and heme-onc consult. [place]  Bella ID# [id number]  Patient is an 84 y/o male with PMHx of Pancreatic CA with lung mets (last chemo 12/19), T2DM, HTN, CAD, and Afib on Eliquis who presents with fever and chest pain x 3 days. His son reports that pt spiked a temperature of 102.2F at home and has also been having reduced PO intake, shortness of breath on exertion, and dizziness. His last chemo (unclear regimen) was 12/26 and he has been having progressive generalized weakness since then. He denies any headaches, visual disturbances, N/V/D, dizziness, falls, palpitations, lower extremity swelling, skin rash, recent travel, or sick contacts    VITALS   T(C): 36.7 (12-30-23 @ 06:02), Max: 38.4 (12-29-23 @ 22:37)  HR: 72 (12-30-23 @ 06:02) (72 - 81)  BP: 102/55 (12-30-23 @ 06:02) (96/56 - 107/69)  RR: 18 (12-30-23 @ 06:02) (18 - 18)  SpO2: 98% (12-30-23 @ 06:02) (95% - 98%)12-30-23 @ 07:18    GENERAL APPEARANCE: Well developed, AA0x2-3, in NAD   HEENT: Normocephalic, PERRL, EOMI  CARDIAC: Normal S1 and S2. No S3, S4 or murmurs. Rhythm is regular. There is trace peripheral edema, without cyanosis or pallor.  LUNGS: Clear to auscultation and percussion without rales, rhonchi, wheezing or diminished breath sounds.  ABDOMEN: Positive bowel sounds. Soft, nondistended, nontender. No guarding or rebound. No masses.  EXTREMITIES: No significant deformity or joint abnormality. No edema. Peripheral pulses intact.   NEUROLOGICAL: CN II-XII intact. Unable to perform full exam.   SKIN: No skin breakdown, lesions or rashes noted     PERTINENT LABS   Hemoglobin: 8.9 g/dL (12-30-23 @ 02:30)  Hematocrit: 25.1 % (12-30-23 @ 02:30)  Platelet Count - Automated: 169 K/uL (12-30-23 @ 02:30)  Sodium: 133 mmol/L (12-30-23 @ 02:30)  Potassium: 3.6 mmol/L (12-30-23 @ 02:30)  Chloride: 102 mmol/L (12-30-23 @ 02:30)  Carbon Dioxide: 22 mmol/L (12-30-23 @ 02:30)  Glucose: 187 mg/dL (12-30-23 @ 02:30)  Blood Urea Nitrogen: 27 mg/dL (12-30-23 @ 02:30)  Albumin: 2.6 g/dL (12-30-23 @ 02:30)  Creatinine: 1.57 mg/dL (12-30-23 @ 02:30)  Aspartate Aminotransferase (AST/SGOT): 8 U/L (12-30-23 @ 02:30)  Alanine Aminotransferase (ALT/SGPT): 17 U/L DA (12-30-23 @ 02:30)  Alkaline Phosphatase: 56 U/L (12-30-23 @ 02:30)    EKG reviewed: NSR   Pertinent Imaging:  CXR without focal consolidation; cephalization +    ASSESSMENT AND PLAN:  # Severe Neutropenic Sepsis   # Metastatic Pancreatic Cancer  # Acute Kidney Injury   # T2DM   # HTN    # Protein Energy Malnutrition     Patient is an 84 y/o Immunocompromised male on active chemotherapy presenting with neutropenia and fevers; will cover with Cefepime and Vancomycin. S/p 3L IVF, BP still borderline at 96/56 mmHg but patient appears able to tolerate more fluid hydration. Will trend down lactate and f/u sepsis w/u to localize source; CXR non-focal. GOC addressed at length with son who wants to discuss with other family before making any decisions but leans toward DNR/DNI. Chest pain is unlikely ACS; ruled out with negative trop and EKG. C/w home meds except anti-hypertensives in setting of sepsis and OHAs; c/w insulin HSS. Would appreciate ID, and heme-onc consult.

## 2023-12-30 NOTE — H&P ADULT - ASSESSMENT
84 y/o M, from home (HHA 12h/5d), ambulates independently, with PMHx of Pancreatic CA with lung mets(last chemo 12/19), DM, HTN, CAD, and Afib on Eliquis who presents with fever. Admitted for Neutropenic fever workup.  82 y/o M, from home (HHA 12h/5d), ambulates independently, with PMHx of Pancreatic CA with lung mets(last chemo 12/19), DM, HTN, CAD, and Afib on Eliquis who presents with fever. Admitted for Neutropenic fever workup.  82 y/o M, from home (HHA 12h/5d), ambulates independently, with PMHx of Pancreatic CA with lung mets(last chemo 12/19), DM, HTN, CAD, and Afib on Eliquis who presents with fever. Admitted for Neutropenic fever workup.      84 y/o M, from home (HHA 12h/5d), ambulates independently, with PMHx of Pancreatic CA with lung mets(last chemo 12/19), DM, HTN, CAD, and Afib on Eliquis who presents with fever. Admitted for Neutropenic fever workup.      82 y/o M, from home (HHA 12h/5d), ambulates independently, with PMHx of Pancreatic CA with lung mets(last chemo 12/19), DM, HTN, CAD, and Afib on Eliquis who presents with fever. Admitted for Neutropenic fever workup.    Pharmacy close, family member retreived a list of meds, unsure if there are other medications. please confirm med rec in AM with pharmacy  84 y/o M, from home (HHA 12h/5d), ambulates independently, with PMHx of Pancreatic CA with lung mets(last chemo 12/19), DM, HTN, CAD, and Afib on Eliquis who presents with fever. Admitted for Neutropenic fever workup.    Pharmacy close, family member retreived a list of meds, unsure if there are other medications. please confirm med rec in AM with pharmacy

## 2023-12-30 NOTE — ED PROVIDER NOTE - OBJECTIVE STATEMENT
83 year old male PMH DM, HTN, CAD, afib, pancreatic cancer with lung mets coming in with fever and generalized weakness since this morning. son states last chemo was last tuesday. pt denies all other complaints.

## 2023-12-30 NOTE — H&P ADULT - CONVERSATION DETAILS
[facility] conversation held with son and patient. Son states pt does not want CPR or intubation, while patient states he does want CPR. Family states they will have further discussion before coming to a decision regarding CPR.  Also family is unsure if they would like to use pressor medications if worsening of BP. They request more discussion time before coming to a decision, however they will discuss today.  Will continue with FULL CODE for now. [facility] conversation held with son and patient. Son states pt does not want CPR or intubation, while patient states he does want CPR. Family states they will have further discussion before coming to a decision regarding CPR.  Also family is unsure if they would like to use pressor medications if worsening of BP. They request more discussion time before coming to a decision, however they will discuss today.  Will continue with FULL CODE for now.

## 2023-12-30 NOTE — H&P ADULT - NSHPPHYSICALEXAM_GEN_ALL_CORE
GENERAL: NAD, regular build, laying in bed  HEAD:  Atraumatic, Normocephalic  EYES: EOMI, PERRLA, conjunctiva and sclera clear  NECK: Supple  CHEST/LUNG: Clear to auscultation bilaterally, no RRW  HEART: Regular rate and rhythm; No murmurs, rubs, or gallops  ABDOMEN: Soft, Nontender, Nondistended; Bowel sounds present  EXTREMITIES:  2+ Peripheral Pulses, No edema  PSYCH: AAOx2-3 (confused on question for date, answered 2023 and 1964)  NEUROLOGY: non-focal  SKIN: No rashes or lesions

## 2023-12-30 NOTE — H&P ADULT - PROBLEM SELECTOR PLAN 1
WBC 1.26, T 101.2, HR 81, BP 96/56(meets sepsis criteria)   (severe neutropenia)  Unidentified source  CXR clear, UA pending  - F/u UA, UCx, BCx  - F/u sputum Cx, MRSA swab  - C/w Cefepime/Vancomycin WBC 1.26, T 101.2, HR 81, BP 96/56(meets sepsis criteria)   (severe neutropenia)  Unidentified source  CXR clear, UA pending  - F/u UA, UCx, BCx  - F/u sputum Cx, MRSA swab  - C/w Cefepime/Vancomycin  ID Dr. Ventura Consulted

## 2023-12-30 NOTE — ED ADULT NURSE NOTE - NSFALLHARMRISKINTERV_ED_ALL_ED
Assistance OOB with selected safe patient handling equipment if applicable/Assistance with ambulation/Communicate risk of Fall with Harm to all staff, patient, and family/Monitor gait and stability/Provide visual cue: red socks, yellow wristband, yellow gown, etc/Reinforce activity limits and safety measures with patient and family/Toileting schedule using arm’s reach rule for commode and bathroom/Bed in lowest position, wheels locked, appropriate side rails in place/Call bell, personal items and telephone in reach/Instruct patient to call for assistance before getting out of bed/chair/stretcher/Non-slip footwear applied when patient is off stretcher/[place] to call system/Physically safe environment - no spills, clutter or unnecessary equipment/Purposeful Proactive Rounding/Room/bathroom lighting operational, light cord in reach Assistance OOB with selected safe patient handling equipment if applicable/Assistance with ambulation/Communicate risk of Fall with Harm to all staff, patient, and family/Monitor gait and stability/Provide visual cue: red socks, yellow wristband, yellow gown, etc/Reinforce activity limits and safety measures with patient and family/Toileting schedule using arm’s reach rule for commode and bathroom/Bed in lowest position, wheels locked, appropriate side rails in place/Call bell, personal items and telephone in reach/Instruct patient to call for assistance before getting out of bed/chair/stretcher/Non-slip footwear applied when patient is off stretcher/[place] to call system/Physically safe environment - no spills, clutter or unnecessary equipment/Purposeful Proactive Rounding/Room/bathroom lighting operational, light cord in reach

## 2023-12-30 NOTE — H&P ADULT - PROBLEM SELECTOR PLAN 9
Pt has a history of CAD Pt has a history of CAD, takes Eliquis at home. No statin medication on record  - Please complete formal med rec with pharmacy to verify

## 2023-12-30 NOTE — H&P ADULT - PROBLEM SELECTOR PLAN 4
Pt presents with CP, epigastric likely related to heartburn and pancreatic cancer  Trop neg x1  - F/u EKG

## 2023-12-30 NOTE — H&P ADULT - PROBLEM SELECTOR PLAN 6
Pt has a history of DM, takes __ at home  - C/w ISS  FS ACHS Pt has a history of DM, takes metformin 500mg BID at home  - C/w ISS  FS ACHS

## 2023-12-30 NOTE — CHART NOTE - NSCHARTNOTEFT_GEN_A_CORE
Spoke with patient via  [id number] [facility]. AAOx1- non organized thoughts. offers no complaints.    Vital Signs Last 24 Hrs  T(C): 36.7 (30 Dec 2023 15:30), Max: 38.4 (29 Dec 2023 22:37)  T(F): 98.1 (30 Dec 2023 15:30), Max: 101.2 (29 Dec 2023 22:37)  HR: 86 (30 Dec 2023 15:30) (72 - 86)  BP: 127/67 (30 Dec 2023 15:30) (96/56 - 127/67)  BP(mean): 87 (30 Dec 2023 15:30) (87 - 87)  RR: 18 (30 Dec 2023 15:30) (18 - 19)  SpO2: 95% (30 Dec 2023 15:30) (95% - 98%)    Parameters below as of 30 Dec 2023 15:30  Patient On (Oxygen Delivery Method): room air    Physical: Pale, no acute distress aaox1, heart regular lungs clear, abd soft nontender bowel sounds present, lower ext mild edema    # Severe Neutropenic Sepsis   # Metastatic Pancreatic Cancer  # Acute Kidney Injury   # T2DM   # HTN    # Protein Energy Malnutrition     continue broad spectrum vanc cefepime for neutropenic fevers  vanc trough tomorrow afternoon prior to third dose  delmer improving  need pall followup  monitor sugars on sliding scale  pancrealipase  heme/onc followup Spoke with patient via  [id number] [facility]. AAOx1- non organized thoughts. offers no complaints.    Vital Signs Last 24 Hrs  T(C): 36.7 (30 Dec 2023 15:30), Max: 38.4 (29 Dec 2023 22:37)  T(F): 98.1 (30 Dec 2023 15:30), Max: 101.2 (29 Dec 2023 22:37)  HR: 86 (30 Dec 2023 15:30) (72 - 86)  BP: 127/67 (30 Dec 2023 15:30) (96/56 - 127/67)  BP(mean): 87 (30 Dec 2023 15:30) (87 - 87)  RR: 18 (30 Dec 2023 15:30) (18 - 19)  SpO2: 95% (30 Dec 2023 15:30) (95% - 98%)    Parameters below as of 30 Dec 2023 15:30  Patient On (Oxygen Delivery Method): room air    Physical: Pale, no acute distress aaox1, heart regular lungs clear, abd soft nontender bowel sounds present, lower ext mild edema    # Severe Neutropenic Sepsis   # Metastatic Pancreatic Cancer  # Acute Kidney Injury   # T2DM   # HTN    # Protein Energy Malnutrition     continue broad spectrum vanc cefepime for neutropenic fevers  vanc trough tomorrow afternoon prior to third dose  delmer improving  need pall followup  monitor sugars on sliding scale  pancrealipase  heme/onc followup

## 2023-12-30 NOTE — H&P ADULT - PROBLEM SELECTOR PLAN 2
Cr 1.57 on admission, baseline 1.05 dec 22'  Likely prerenal in setting of decreased PO intake  - s/p 3LNS  - C/w IVFs  - Avoid Nephrotoxic agents (NSAIDs, ACEi, ARBs)  - F/u Urine lytes

## 2023-12-30 NOTE — PATIENT PROFILE ADULT - STATED REASON FOR ADMISSION
Pt has two cancer and he was doing chemo , he felt weak for 2-3 days and then had a fever overnight.

## 2023-12-30 NOTE — H&P ADULT - PROBLEM SELECTOR PLAN 7
Pt has a history of HTN, takes ___ at home  - BP soft, will hold all meds Pt has a history of HTN, not on meds at home  - BP soft, will hold all meds

## 2023-12-30 NOTE — H&P ADULT - NSHPREVIEWOFSYSTEMS_GEN_ALL_CORE
CONSTITUTIONAL: + fever. No weight loss, or fatigue  RESPIRATORY: No cough, wheezing, chills or hemoptysis; No shortness of breath  CARDIOVASCULAR: No chest pain, palpitations, dizziness, or leg swelling  GASTROINTESTINAL: No abdominal pain. No nausea, vomiting, or hematemesis; No diarrhea or constipation. No melena or hematochezia.  GENITOURINARY: No dysuria or hematuria, urinary frequency  NEUROLOGICAL: No headaches, memory loss, loss of strength, numbness, or tremors  ENDOCRINE: No polyuria, polydipsia, or heat/cold intolerance  MUSCULOSKELETAL: No muscle aches, joint pains  HEME: no easy bruisability, no tender or enlarged lymph nodes  SKIN: No itching, burning, rashes, or lesions .

## 2023-12-31 LAB
A1C WITH ESTIMATED AVERAGE GLUCOSE RESULT: 6.9 % — HIGH (ref 4–5.6)
A1C WITH ESTIMATED AVERAGE GLUCOSE RESULT: 6.9 % — HIGH (ref 4–5.6)
ALBUMIN SERPL ELPH-MCNC: 1.8 G/DL — LOW (ref 3.5–5)
ALBUMIN SERPL ELPH-MCNC: 1.8 G/DL — LOW (ref 3.5–5)
ALP SERPL-CCNC: 47 U/L — SIGNIFICANT CHANGE UP (ref 40–120)
ALP SERPL-CCNC: 47 U/L — SIGNIFICANT CHANGE UP (ref 40–120)
ALT FLD-CCNC: 12 U/L DA — SIGNIFICANT CHANGE UP (ref 10–60)
ALT FLD-CCNC: 12 U/L DA — SIGNIFICANT CHANGE UP (ref 10–60)
ANION GAP SERPL CALC-SCNC: 8 MMOL/L — SIGNIFICANT CHANGE UP (ref 5–17)
ANION GAP SERPL CALC-SCNC: 8 MMOL/L — SIGNIFICANT CHANGE UP (ref 5–17)
AST SERPL-CCNC: 9 U/L — LOW (ref 10–40)
AST SERPL-CCNC: 9 U/L — LOW (ref 10–40)
BASOPHILS # BLD AUTO: 0.02 K/UL — SIGNIFICANT CHANGE UP (ref 0–0.2)
BASOPHILS # BLD AUTO: 0.02 K/UL — SIGNIFICANT CHANGE UP (ref 0–0.2)
BASOPHILS # BLD AUTO: 0.03 K/UL — SIGNIFICANT CHANGE UP (ref 0–0.2)
BASOPHILS # BLD AUTO: 0.03 K/UL — SIGNIFICANT CHANGE UP (ref 0–0.2)
BASOPHILS NFR BLD AUTO: 0.8 % — SIGNIFICANT CHANGE UP (ref 0–2)
BASOPHILS NFR BLD AUTO: 0.8 % — SIGNIFICANT CHANGE UP (ref 0–2)
BASOPHILS NFR BLD AUTO: 0.9 % — SIGNIFICANT CHANGE UP (ref 0–2)
BASOPHILS NFR BLD AUTO: 0.9 % — SIGNIFICANT CHANGE UP (ref 0–2)
BILIRUB SERPL-MCNC: 0.4 MG/DL — SIGNIFICANT CHANGE UP (ref 0.2–1.2)
BILIRUB SERPL-MCNC: 0.4 MG/DL — SIGNIFICANT CHANGE UP (ref 0.2–1.2)
BUN SERPL-MCNC: 25 MG/DL — HIGH (ref 7–18)
BUN SERPL-MCNC: 25 MG/DL — HIGH (ref 7–18)
CALCIUM SERPL-MCNC: 7.5 MG/DL — LOW (ref 8.4–10.5)
CALCIUM SERPL-MCNC: 7.5 MG/DL — LOW (ref 8.4–10.5)
CHLORIDE SERPL-SCNC: 108 MMOL/L — SIGNIFICANT CHANGE UP (ref 96–108)
CHLORIDE SERPL-SCNC: 108 MMOL/L — SIGNIFICANT CHANGE UP (ref 96–108)
CK MB BLD-MCNC: <2.8 % — SIGNIFICANT CHANGE UP (ref 0–3.5)
CK MB BLD-MCNC: <2.8 % — SIGNIFICANT CHANGE UP (ref 0–3.5)
CK MB CFR SERPL CALC: <1 NG/ML — SIGNIFICANT CHANGE UP (ref 0–3.6)
CK MB CFR SERPL CALC: <1 NG/ML — SIGNIFICANT CHANGE UP (ref 0–3.6)
CK SERPL-CCNC: 36 U/L — SIGNIFICANT CHANGE UP (ref 35–232)
CK SERPL-CCNC: 36 U/L — SIGNIFICANT CHANGE UP (ref 35–232)
CO2 SERPL-SCNC: 19 MMOL/L — LOW (ref 22–31)
CO2 SERPL-SCNC: 19 MMOL/L — LOW (ref 22–31)
CREAT SERPL-MCNC: 0.96 MG/DL — SIGNIFICANT CHANGE UP (ref 0.5–1.3)
CREAT SERPL-MCNC: 0.96 MG/DL — SIGNIFICANT CHANGE UP (ref 0.5–1.3)
CULTURE RESULTS: NO GROWTH — SIGNIFICANT CHANGE UP
CULTURE RESULTS: NO GROWTH — SIGNIFICANT CHANGE UP
EGFR: 78 ML/MIN/1.73M2 — SIGNIFICANT CHANGE UP
EGFR: 78 ML/MIN/1.73M2 — SIGNIFICANT CHANGE UP
EOSINOPHIL # BLD AUTO: 0.03 K/UL — SIGNIFICANT CHANGE UP (ref 0–0.5)
EOSINOPHIL # BLD AUTO: 0.03 K/UL — SIGNIFICANT CHANGE UP (ref 0–0.5)
EOSINOPHIL # BLD AUTO: 0.04 K/UL — SIGNIFICANT CHANGE UP (ref 0–0.5)
EOSINOPHIL # BLD AUTO: 0.04 K/UL — SIGNIFICANT CHANGE UP (ref 0–0.5)
EOSINOPHIL NFR BLD AUTO: 1.1 % — SIGNIFICANT CHANGE UP (ref 0–6)
EOSINOPHIL NFR BLD AUTO: 1.1 % — SIGNIFICANT CHANGE UP (ref 0–6)
EOSINOPHIL NFR BLD AUTO: 1.4 % — SIGNIFICANT CHANGE UP (ref 0–6)
EOSINOPHIL NFR BLD AUTO: 1.4 % — SIGNIFICANT CHANGE UP (ref 0–6)
ESTIMATED AVERAGE GLUCOSE: 151 MG/DL — HIGH (ref 68–114)
ESTIMATED AVERAGE GLUCOSE: 151 MG/DL — HIGH (ref 68–114)
GLUCOSE BLDC GLUCOMTR-MCNC: 100 MG/DL — HIGH (ref 70–99)
GLUCOSE BLDC GLUCOMTR-MCNC: 101 MG/DL — HIGH (ref 70–99)
GLUCOSE BLDC GLUCOMTR-MCNC: 101 MG/DL — HIGH (ref 70–99)
GLUCOSE BLDC GLUCOMTR-MCNC: 102 MG/DL — HIGH (ref 70–99)
GLUCOSE BLDC GLUCOMTR-MCNC: 102 MG/DL — HIGH (ref 70–99)
GLUCOSE SERPL-MCNC: 105 MG/DL — HIGH (ref 70–99)
GLUCOSE SERPL-MCNC: 105 MG/DL — HIGH (ref 70–99)
HCT VFR BLD CALC: 20.5 % — CRITICAL LOW (ref 39–50)
HCT VFR BLD CALC: 20.5 % — CRITICAL LOW (ref 39–50)
HCT VFR BLD CALC: 25.4 % — LOW (ref 39–50)
HCT VFR BLD CALC: 25.4 % — LOW (ref 39–50)
HGB BLD-MCNC: 7.4 G/DL — LOW (ref 13–17)
HGB BLD-MCNC: 7.4 G/DL — LOW (ref 13–17)
HGB BLD-MCNC: 8.9 G/DL — LOW (ref 13–17)
HGB BLD-MCNC: 8.9 G/DL — LOW (ref 13–17)
IMM GRANULOCYTES NFR BLD AUTO: 1.6 % — HIGH (ref 0–0.9)
IMM GRANULOCYTES NFR BLD AUTO: 1.6 % — HIGH (ref 0–0.9)
IMM GRANULOCYTES NFR BLD AUTO: 2.8 % — HIGH (ref 0–0.9)
IMM GRANULOCYTES NFR BLD AUTO: 2.8 % — HIGH (ref 0–0.9)
LYMPHOCYTES # BLD AUTO: 0.6 K/UL — LOW (ref 1–3.3)
LYMPHOCYTES # BLD AUTO: 0.6 K/UL — LOW (ref 1–3.3)
LYMPHOCYTES # BLD AUTO: 1 K/UL — SIGNIFICANT CHANGE UP (ref 1–3.3)
LYMPHOCYTES # BLD AUTO: 1 K/UL — SIGNIFICANT CHANGE UP (ref 1–3.3)
LYMPHOCYTES # BLD AUTO: 26.7 % — SIGNIFICANT CHANGE UP (ref 13–44)
LYMPHOCYTES # BLD AUTO: 26.7 % — SIGNIFICANT CHANGE UP (ref 13–44)
LYMPHOCYTES # BLD AUTO: 28.2 % — SIGNIFICANT CHANGE UP (ref 13–44)
LYMPHOCYTES # BLD AUTO: 28.2 % — SIGNIFICANT CHANGE UP (ref 13–44)
MAGNESIUM SERPL-MCNC: 1.4 MG/DL — LOW (ref 1.6–2.6)
MAGNESIUM SERPL-MCNC: 1.4 MG/DL — LOW (ref 1.6–2.6)
MCHC RBC-ENTMCNC: 29.3 PG — SIGNIFICANT CHANGE UP (ref 27–34)
MCHC RBC-ENTMCNC: 29.3 PG — SIGNIFICANT CHANGE UP (ref 27–34)
MCHC RBC-ENTMCNC: 29.5 PG — SIGNIFICANT CHANGE UP (ref 27–34)
MCHC RBC-ENTMCNC: 29.5 PG — SIGNIFICANT CHANGE UP (ref 27–34)
MCHC RBC-ENTMCNC: 35 GM/DL — SIGNIFICANT CHANGE UP (ref 32–36)
MCHC RBC-ENTMCNC: 35 GM/DL — SIGNIFICANT CHANGE UP (ref 32–36)
MCHC RBC-ENTMCNC: 36.1 GM/DL — HIGH (ref 32–36)
MCHC RBC-ENTMCNC: 36.1 GM/DL — HIGH (ref 32–36)
MCV RBC AUTO: 81.7 FL — SIGNIFICANT CHANGE UP (ref 80–100)
MCV RBC AUTO: 81.7 FL — SIGNIFICANT CHANGE UP (ref 80–100)
MCV RBC AUTO: 83.6 FL — SIGNIFICANT CHANGE UP (ref 80–100)
MCV RBC AUTO: 83.6 FL — SIGNIFICANT CHANGE UP (ref 80–100)
MONOCYTES # BLD AUTO: 0.64 K/UL — SIGNIFICANT CHANGE UP (ref 0–0.9)
MONOCYTES # BLD AUTO: 0.64 K/UL — SIGNIFICANT CHANGE UP (ref 0–0.9)
MONOCYTES # BLD AUTO: 1.1 K/UL — HIGH (ref 0–0.9)
MONOCYTES # BLD AUTO: 1.1 K/UL — HIGH (ref 0–0.9)
MONOCYTES NFR BLD AUTO: 29.4 % — HIGH (ref 2–14)
MONOCYTES NFR BLD AUTO: 29.4 % — HIGH (ref 2–14)
MONOCYTES NFR BLD AUTO: 30 % — HIGH (ref 2–14)
MONOCYTES NFR BLD AUTO: 30 % — HIGH (ref 2–14)
NEUTROPHILS # BLD AUTO: 0.78 K/UL — LOW (ref 1.8–7.4)
NEUTROPHILS # BLD AUTO: 0.78 K/UL — LOW (ref 1.8–7.4)
NEUTROPHILS # BLD AUTO: 1.51 K/UL — LOW (ref 1.8–7.4)
NEUTROPHILS # BLD AUTO: 1.51 K/UL — LOW (ref 1.8–7.4)
NEUTROPHILS NFR BLD AUTO: 36.7 % — LOW (ref 43–77)
NEUTROPHILS NFR BLD AUTO: 36.7 % — LOW (ref 43–77)
NEUTROPHILS NFR BLD AUTO: 40.4 % — LOW (ref 43–77)
NEUTROPHILS NFR BLD AUTO: 40.4 % — LOW (ref 43–77)
NRBC # BLD: 0 /100 WBCS — SIGNIFICANT CHANGE UP (ref 0–0)
PHOSPHATE SERPL-MCNC: 2.3 MG/DL — LOW (ref 2.5–4.5)
PHOSPHATE SERPL-MCNC: 2.3 MG/DL — LOW (ref 2.5–4.5)
PLATELET # BLD AUTO: 161 K/UL — SIGNIFICANT CHANGE UP (ref 150–400)
PLATELET # BLD AUTO: 161 K/UL — SIGNIFICANT CHANGE UP (ref 150–400)
PLATELET # BLD AUTO: 224 K/UL — SIGNIFICANT CHANGE UP (ref 150–400)
PLATELET # BLD AUTO: 224 K/UL — SIGNIFICANT CHANGE UP (ref 150–400)
POTASSIUM SERPL-MCNC: 3.1 MMOL/L — LOW (ref 3.5–5.3)
POTASSIUM SERPL-MCNC: 3.1 MMOL/L — LOW (ref 3.5–5.3)
POTASSIUM SERPL-SCNC: 3.1 MMOL/L — LOW (ref 3.5–5.3)
POTASSIUM SERPL-SCNC: 3.1 MMOL/L — LOW (ref 3.5–5.3)
PROT SERPL-MCNC: 4.3 G/DL — LOW (ref 6–8.3)
PROT SERPL-MCNC: 4.3 G/DL — LOW (ref 6–8.3)
RBC # BLD: 2.51 M/UL — LOW (ref 4.2–5.8)
RBC # BLD: 2.51 M/UL — LOW (ref 4.2–5.8)
RBC # BLD: 3.04 M/UL — LOW (ref 4.2–5.8)
RBC # BLD: 3.04 M/UL — LOW (ref 4.2–5.8)
RBC # FLD: 14.9 % — HIGH (ref 10.3–14.5)
RBC # FLD: 14.9 % — HIGH (ref 10.3–14.5)
RBC # FLD: 15.1 % — HIGH (ref 10.3–14.5)
RBC # FLD: 15.1 % — HIGH (ref 10.3–14.5)
SODIUM SERPL-SCNC: 135 MMOL/L — SIGNIFICANT CHANGE UP (ref 135–145)
SODIUM SERPL-SCNC: 135 MMOL/L — SIGNIFICANT CHANGE UP (ref 135–145)
SPECIMEN SOURCE: SIGNIFICANT CHANGE UP
SPECIMEN SOURCE: SIGNIFICANT CHANGE UP
TROPONIN I, HIGH SENSITIVITY RESULT: 14.6 NG/L — SIGNIFICANT CHANGE UP
TROPONIN I, HIGH SENSITIVITY RESULT: 14.6 NG/L — SIGNIFICANT CHANGE UP
WBC # BLD: 2.13 K/UL — LOW (ref 3.8–10.5)
WBC # BLD: 2.13 K/UL — LOW (ref 3.8–10.5)
WBC # BLD: 3.74 K/UL — LOW (ref 3.8–10.5)
WBC # BLD: 3.74 K/UL — LOW (ref 3.8–10.5)
WBC # FLD AUTO: 2.13 K/UL — LOW (ref 3.8–10.5)
WBC # FLD AUTO: 2.13 K/UL — LOW (ref 3.8–10.5)
WBC # FLD AUTO: 3.74 K/UL — LOW (ref 3.8–10.5)
WBC # FLD AUTO: 3.74 K/UL — LOW (ref 3.8–10.5)

## 2023-12-31 PROCEDURE — 99233 SBSQ HOSP IP/OBS HIGH 50: CPT

## 2023-12-31 PROCEDURE — 99223 1ST HOSP IP/OBS HIGH 75: CPT

## 2023-12-31 PROCEDURE — 74177 CT ABD & PELVIS W/CONTRAST: CPT | Mod: 26

## 2023-12-31 RX ORDER — MORPHINE SULFATE 50 MG/1
1 CAPSULE, EXTENDED RELEASE ORAL ONCE
Refills: 0 | Status: DISCONTINUED | OUTPATIENT
Start: 2023-12-31 | End: 2023-12-31

## 2023-12-31 RX ORDER — ASPIRIN/CALCIUM CARB/MAGNESIUM 324 MG
324 TABLET ORAL ONCE
Refills: 0 | Status: COMPLETED | OUTPATIENT
Start: 2023-12-31 | End: 2023-12-31

## 2023-12-31 RX ORDER — METRONIDAZOLE 500 MG
500 TABLET ORAL EVERY 8 HOURS
Refills: 0 | Status: DISCONTINUED | OUTPATIENT
Start: 2023-12-31 | End: 2024-01-02

## 2023-12-31 RX ORDER — PANTOPRAZOLE SODIUM 20 MG/1
40 TABLET, DELAYED RELEASE ORAL EVERY 12 HOURS
Refills: 0 | Status: DISCONTINUED | OUTPATIENT
Start: 2023-12-31 | End: 2024-01-02

## 2023-12-31 RX ORDER — SODIUM,POTASSIUM PHOSPHATES 278-250MG
2 POWDER IN PACKET (EA) ORAL ONCE
Refills: 0 | Status: COMPLETED | OUTPATIENT
Start: 2023-12-31 | End: 2023-12-31

## 2023-12-31 RX ORDER — MAGNESIUM SULFATE 500 MG/ML
2 VIAL (ML) INJECTION ONCE
Refills: 0 | Status: COMPLETED | OUTPATIENT
Start: 2023-12-31 | End: 2023-12-31

## 2023-12-31 RX ORDER — DEXTROSE MONOHYDRATE, SODIUM CHLORIDE, AND POTASSIUM CHLORIDE 50; .745; 4.5 G/1000ML; G/1000ML; G/1000ML
1000 INJECTION, SOLUTION INTRAVENOUS
Refills: 0 | Status: DISCONTINUED | OUTPATIENT
Start: 2023-12-31 | End: 2024-01-02

## 2023-12-31 RX ADMIN — CEFEPIME 100 MILLIGRAM(S): 1 INJECTION, POWDER, FOR SOLUTION INTRAMUSCULAR; INTRAVENOUS at 05:24

## 2023-12-31 RX ADMIN — PANTOPRAZOLE SODIUM 40 MILLIGRAM(S): 20 TABLET, DELAYED RELEASE ORAL at 17:50

## 2023-12-31 RX ADMIN — Medication 100 MILLIGRAM(S): at 22:09

## 2023-12-31 RX ADMIN — MORPHINE SULFATE 1 MILLIGRAM(S): 50 CAPSULE, EXTENDED RELEASE ORAL at 09:44

## 2023-12-31 RX ADMIN — APIXABAN 2.5 MILLIGRAM(S): 2.5 TABLET, FILM COATED ORAL at 05:58

## 2023-12-31 RX ADMIN — CEFEPIME 100 MILLIGRAM(S): 1 INJECTION, POWDER, FOR SOLUTION INTRAMUSCULAR; INTRAVENOUS at 17:51

## 2023-12-31 RX ADMIN — Medication 324 MILLIGRAM(S): at 01:52

## 2023-12-31 RX ADMIN — Medication 100 MILLIGRAM(S): at 14:09

## 2023-12-31 RX ADMIN — MORPHINE SULFATE 1 MILLIGRAM(S): 50 CAPSULE, EXTENDED RELEASE ORAL at 02:15

## 2023-12-31 RX ADMIN — MORPHINE SULFATE 1 MILLIGRAM(S): 50 CAPSULE, EXTENDED RELEASE ORAL at 01:49

## 2023-12-31 RX ADMIN — SODIUM CHLORIDE 80 MILLILITER(S): 9 INJECTION INTRAMUSCULAR; INTRAVENOUS; SUBCUTANEOUS at 08:52

## 2023-12-31 RX ADMIN — PANTOPRAZOLE SODIUM 40 MILLIGRAM(S): 20 TABLET, DELAYED RELEASE ORAL at 05:58

## 2023-12-31 RX ADMIN — Medication 25 GRAM(S): at 10:27

## 2023-12-31 RX ADMIN — DEXTROSE MONOHYDRATE, SODIUM CHLORIDE, AND POTASSIUM CHLORIDE 75 MILLILITER(S): 50; .745; 4.5 INJECTION, SOLUTION INTRAVENOUS at 10:07

## 2023-12-31 RX ADMIN — ONDANSETRON 4 MILLIGRAM(S): 8 TABLET, FILM COATED ORAL at 09:32

## 2023-12-31 RX ADMIN — MORPHINE SULFATE 1 MILLIGRAM(S): 50 CAPSULE, EXTENDED RELEASE ORAL at 10:00

## 2023-12-31 RX ADMIN — Medication 2 TABLET(S): at 09:32

## 2023-12-31 NOTE — PROVIDER CONTACT NOTE (CRITICAL VALUE NOTIFICATION) - NS PROVIDER READ BACK TO LAB
1. You were seen in the ENT Clinic today by Dr. Nissen.  If you have any questions or concerns after your appointment, please call   - Option 1: ENT Clinic: [phone]   - Option 2: Tracey (Dr. Nissen's Nurse): [phone]                   Emily(Dr. Nissen's Nurse): [phone]      2.   Plan to return to clinic in 2-3 weeks with hearing test    Tracey Carroll LPN  [facility] - Otolaryngology      
yes

## 2023-12-31 NOTE — CHART NOTE - NSCHARTNOTEFT_GEN_A_CORE
EVENT:  pt. c/o chest pain per nurse      HPI:  This is an 82 y/o M, from home (HHA 12h/5d), ambulates independently, with PMHx of Pancreatic CA with lung mets (last chemo 12/19), DM, HTN, CAD, and Afib on Eliquis who presents with fever. Pt states he has been feeling chest pain, while pointing at his epigastric area. Per son, pt has had this complaint for 3 days and was evaluated by a doctor who said it was likely heartburn. Son states pt had a temperature of 39C at home along with 2-3 days of not eating or drinking normally. Son also states his father cannot walk without help and has comlpained of occasional shortness of breath. Pt has chronic dizziness per son. Pt denies any sick contacts, headaches, N/V/D, constipation, numbness or tingling.  (30 Dec 2023 06:32)        OBJECTIVE:  Vital Signs Last 24 Hrs  T(C): 37.1 (31 Dec 2023 05:26), Max: 37.9 (30 Dec 2023 11:20)  T(F): 98.8 (31 Dec 2023 05:26), Max: 100.2 (30 Dec 2023 11:20)  HR: 72 (31 Dec 2023 05:26) (72 - 86)  BP: 109/52 (31 Dec 2023 05:26) (100/62 - 127/67)  BP(mean): 87 (30 Dec 2023 15:30) (87 - 87)  RR: 17 (31 Dec 2023 05:26) (17 - 18)  SpO2: 95% (31 Dec 2023 05:26) (94% - 96%)    Parameters below as of 31 Dec 2023 05:26  Patient On (Oxygen Delivery Method): room air        LABS:                        7.4    2.13  )-----------( 161      ( 31 Dec 2023 06:18 )             20.5   CARDIAC MARKERS ( 31 Dec 2023 02:10 )  x     / x     / 36 U/L / x     / <1.0 ng/mL    12-31    135  |  108  |  25<H>  ----------------------------<  105<H>  3.1<L>   |  19<L>  |  0.96    Ca    7.5<L>      31 Dec 2023 06:18  Phos  2.3     12-31  Mg     1.4     12-31    TPro  4.3<L>  /  Alb  1.8<L>  /  TBili  0.4  /  DBili  x   /  AST  9<L>  /  ALT  12  /  AlkPhos  47  12-31            PLAN:   1. discussed with Dr. Marc  2. noted previous trop, ekg (negative)  3. endorsed to nurse give zofran, maalox and morphine x 1  4. ordered urgent ct a/p w/ IV contrast    FOLLOW UP/RESULTS:    1. f/u resolution of chest pain  2. f/u ct a/p result

## 2023-12-31 NOTE — CHART NOTE - NSCHARTNOTEFT_GEN_A_CORE
EVENT:  informed by RN critical hct level 20.5      HPI:  This is an 82 y/o M, from home (HHA 12h/5d), ambulates independently, with PMHx of Pancreatic CA with lung mets (last chemo 12/19), DM, HTN, CAD, and Afib on Eliquis who presents with fever. Pt states he has been feeling chest pain, while pointing at his epigastric area. Per son, pt has had this complaint for 3 days and was evaluated by a doctor who said it was likely heartburn. Son states pt had a temperature of 39C at home along with 2-3 days of not eating or drinking normally. Son also states his father cannot walk without help and has comlpained of occasional shortness of breath. Pt has chronic dizziness per son. Pt denies any sick contacts, headaches, N/V/D, constipation, numbness or tingling.  (30 Dec 2023 06:32)        OBJECTIVE:  Vital Signs Last 24 Hrs  T(C): 37.1 (31 Dec 2023 05:26), Max: 37.9 (30 Dec 2023 11:20)  T(F): 98.8 (31 Dec 2023 05:26), Max: 100.2 (30 Dec 2023 11:20)  HR: 72 (31 Dec 2023 05:26) (72 - 86)  BP: 109/52 (31 Dec 2023 05:26) (100/62 - 127/67)  BP(mean): 87 (30 Dec 2023 15:30) (87 - 87)  RR: 17 (31 Dec 2023 05:26) (17 - 18)  SpO2: 95% (31 Dec 2023 05:26) (94% - 96%)    Parameters below as of 31 Dec 2023 05:26  Patient On (Oxygen Delivery Method): room air        LABS:                        7.4    2.13  )-----------( 161      ( 31 Dec 2023 06:18 )             20.5   CARDIAC MARKERS ( 31 Dec 2023 02:10 )  x     / x     / 36 U/L / x     / <1.0 ng/mL    12-31    135  |  108  |  25<H>  ----------------------------<  105<H>  3.1<L>   |  19<L>  |  0.96    Ca    7.5<L>      31 Dec 2023 06:18  Phos  2.3     12-31  Mg     1.4     12-31    TPro  4.3<L>  /  Alb  1.8<L>  /  TBili  0.4  /  DBili  x   /  AST  9<L>  /  ALT  12  /  AlkPhos  47  12-31            PLAN:   1. repeat cbc    FOLLOW UP/RESULTS:    1. f/u repeat cbc EVENT:  informed by RN critical hct level 20.5      HPI:  This is an 84 y/o M, from home (HHA 12h/5d), ambulates independently, with PMHx of Pancreatic CA with lung mets (last chemo 12/19), DM, HTN, CAD, and Afib on Eliquis who presents with fever. Pt states he has been feeling chest pain, while pointing at his epigastric area. Per son, pt has had this complaint for 3 days and was evaluated by a doctor who said it was likely heartburn. Son states pt had a temperature of 39C at home along with 2-3 days of not eating or drinking normally. Son also states his father cannot walk without help and has comlpained of occasional shortness of breath. Pt has chronic dizziness per son. Pt denies any sick contacts, headaches, N/V/D, constipation, numbness or tingling.  (30 Dec 2023 06:32)        OBJECTIVE:  Vital Signs Last 24 Hrs  T(C): 37.1 (31 Dec 2023 05:26), Max: 37.9 (30 Dec 2023 11:20)  T(F): 98.8 (31 Dec 2023 05:26), Max: 100.2 (30 Dec 2023 11:20)  HR: 72 (31 Dec 2023 05:26) (72 - 86)  BP: 109/52 (31 Dec 2023 05:26) (100/62 - 127/67)  BP(mean): 87 (30 Dec 2023 15:30) (87 - 87)  RR: 17 (31 Dec 2023 05:26) (17 - 18)  SpO2: 95% (31 Dec 2023 05:26) (94% - 96%)    Parameters below as of 31 Dec 2023 05:26  Patient On (Oxygen Delivery Method): room air        LABS:                        7.4    2.13  )-----------( 161      ( 31 Dec 2023 06:18 )             20.5   CARDIAC MARKERS ( 31 Dec 2023 02:10 )  x     / x     / 36 U/L / x     / <1.0 ng/mL    12-31    135  |  108  |  25<H>  ----------------------------<  105<H>  3.1<L>   |  19<L>  |  0.96    Ca    7.5<L>      31 Dec 2023 06:18  Phos  2.3     12-31  Mg     1.4     12-31    TPro  4.3<L>  /  Alb  1.8<L>  /  TBili  0.4  /  DBili  x   /  AST  9<L>  /  ALT  12  /  AlkPhos  47  12-31            PLAN:   1. repeat cbc    FOLLOW UP/RESULTS:    1. f/u repeat cbc

## 2023-12-31 NOTE — PROGRESS NOTE ADULT - SUBJECTIVE AND OBJECTIVE BOX
INTERVAL HPI/OVERNIGHT EVENTS:   Noted moving all extremities. Minimally verbal- same as yesterday, can answer with further pressing.       REVIEW OF SYSTEMS:  unable to assess    Vital Signs Last 24 Hrs  T(C): 36.8 (31 Dec 2023 14:10), Max: 37.1 (30 Dec 2023 17:30)  T(F): 98.3 (31 Dec 2023 14:10), Max: 98.8 (30 Dec 2023 17:30)  HR: 70 (31 Dec 2023 14:10) (70 - 82)  BP: 130/52 (31 Dec 2023 14:10) (100/62 - 130/52)  BP(mean): --  RR: 16 (31 Dec 2023 14:10) (16 - 18)  SpO2: 95% (31 Dec 2023 14:10) (94% - 96%)    Parameters below as of 31 Dec 2023 14:10  Patient On (Oxygen Delivery Method): room air        PHYSICAL EXAMINATION:  no acute distres, pale appearing, minimally participating in conversation, same as yesterday. No abdominal tenderness, abdomen softbowel sounds present. Heart regular lungs clear                          8.9    3.74  )-----------( 224      ( 31 Dec 2023 12:05 )             25.4     12-31    135  |  108  |  25<H>  ----------------------------<  105<H>  3.1<L>   |  19<L>  |  0.96    Ca    7.5<L>      31 Dec 2023 06:18  Phos  2.3     12-31  Mg     1.4     12-31    TPro  4.3<L>  /  Alb  1.8<L>  /  TBili  0.4  /  DBili  x   /  AST  9<L>  /  ALT  12  /  AlkPhos  47  12-31    LIVER FUNCTIONS - ( 31 Dec 2023 06:18 )  Alb: 1.8 g/dL / Pro: 4.3 g/dL / ALK PHOS: 47 U/L / ALT: 12 U/L DA / AST: 9 U/L / GGT: x           CARDIAC MARKERS ( 31 Dec 2023 02:10 )  x     / x     / 36 U/L / x     / <1.0 ng/mL      PT/INR - ( 30 Dec 2023 04:54 )   PT: 19.8 sec;   INR: 1.77 ratio         PTT - ( 30 Dec 2023 04:54 )  PTT:32.9 sec    CAPILLARY BLOOD GLUCOSE      RADIOLOGY & ADDITIONAL TESTS:

## 2023-12-31 NOTE — PHYSICAL THERAPY INITIAL EVALUATION ADULT - ADDITIONAL COMMENTS
Patient lives in a building apartment with elevator access; independent with transfers, ADLs and ambulation without assistive device.

## 2023-12-31 NOTE — CONSULT NOTE ADULT - ASSESSMENT
84 y/o M, from home with PMHx of Pancreatic CA with lung mets (last chemo 12/19 -??regimen--with no port in place), DM, HTN, CAD, and Afib on Eliquis who presents with fever and reduced WBC. Rx with empiric Abs b/o fever and low WBC, although pt not absolutely neutropenic. Pt now on cefepime and metronidazole to Rx duodenitis noted on CT abd.     # Fever and low WBC-- does not meet neutropenia definition, and WBC now rising.   --Obtain data on type of chemo pt is receiving  --Heme/Onc input  --f/u repeat WBC with diff    #ANA-- likely ATN. Renal fn improving.   --f/u creat.   --management as per medical team    #Duodenitis-- may be due to chemo (see above)  --cont. cefepime and metronidazole for now    #Metastatic pancreatic CA--additional lung infiltrates likely due to met disease

## 2023-12-31 NOTE — CHART NOTE - NSCHARTNOTEFT_GEN_A_CORE
Spoke to wife Radha and son Alan today, updated on clinical status, treatment plan/options and discharge plan/options, all questions answered      Patient follow with Dr. Garcia oncologist [phone] Spoke to wife Radha and son Alan today, updated on clinical status, treatment plan/options and discharge plan/options, all questions answered      Patient follow with Dr. Garcia oncologist [phone]

## 2023-12-31 NOTE — INPATIENT CERTIFICATION FOR MEDICARE PATIENTS - THE STATUS OF COMORBIDITIES.
Fam Hx: no history of heart disease    Social: quit tobacco after lung cancer diagnosis 13 years ago, no etoh/drugs  retired     lives with  in apartment  have aide 10am-4pm 5days/week    uses walker
2. The status of comorbities. (See ED/admit documents)

## 2023-12-31 NOTE — CONSULT NOTE ADULT - SUBJECTIVE AND OBJECTIVE BOX
HPI/Course in Hospital (Hx obtained from chart and patient with help from Mong[place]  #[id number]):    This is an 84 y/o M, from home (HHA 12h/5d), ambulates independently, with PMHx of Pancreatic CA with lung mets (last chemo 12/19 with no port in place), DM, HTN, CAD, and Afib on Eliquis who presents with fever. Pt states he has been feeling chest pain, while pointing at his epigastric area. Per son, pt has had this complaint for 3 days and was evaluated by a doctor who said it was likely heartburn. Son states pt had a temperature of 39C at home along with 2-3 days of not eating or drinking normally. Son also states his father cannot walk without help and has comlpained of occasional shortness of breath. Pt has chronic dizziness per son. Pt denies any sick contacts, headaches, N/V/D, constipation, numbness or tingling.  (30 Dec 2023 06:32). Given empiric Abs b/o concerns for fever in the face of reduced WBC. Received vanco and CTX on admission with antibiotics changed to cefepime and metronidazole b/o CT abd with duodenitis. Pt arousable at the time of my consult; thinks he's home. Denies any complaints.      PAST MEDICAL & SURGICAL HISTORY:  HTN (hypertension)      DM (diabetes mellitus)      MI (myocardial infarction)      Former smoker      MI (myocardial infarction)      PUD (peptic ulcer disease)  remote      Gastritis      Hepatomegaly      HTN (hypertension)      Diabetes      CAD (coronary artery disease)      MI (myocardial infarction)      Hyperlipemia      Diabetes      Myocardial infarct      HTN (hypertension)      No significant past surgical history      H/O heart artery stent          MEDS:  acetaminophen     Tablet .. 650 milliGRAM(s) Oral every 6 hours PRN  aluminum hydroxide/magnesium hydroxide/simethicone Suspension 30 milliLiter(s) Oral every 4 hours PRN  apixaban 2.5 milliGRAM(s) Oral every 12 hours  cefepime   IVPB 2000 milliGRAM(s) IV Intermittent every 12 hours (D2)  dextrose 5% + sodium chloride 0.9% with potassium chloride 20 mEq/L 1000 milliLiter(s) IV Continuous <Continuous>  escitalopram 10 milliGRAM(s) Oral daily  influenza  Vaccine (HIGH DOSE) 0.7 milliLiter(s) IntraMuscular once  insulin lispro (ADMELOG) corrective regimen sliding scale   SubCutaneous three times a day before meals  insulin lispro (ADMELOG) corrective regimen sliding scale   SubCutaneous at bedtime  metroNIDAZOLE  IVPB 500 milliGRAM(s) IV Intermittent every 8 hours (D1)  ondansetron Injectable 4 milliGRAM(s) IV Push every 8 hours PRN  pancrelipase  (CREON 24,000 Lipase Units) 1 Capsule(s) Oral three times a day with meals  pantoprazole  Injectable 40 milliGRAM(s) IV Push every 12 hours      ALLERGIES  No Known Allergies      SOCIAL HISTORY: emigrated from [facility] yrs ago; denies cigs, ETOH; lives with family    FAMILY HISTORY:  Family history of stroke (Father)        ROS: no complaints. ??reliability in view of pt's confusion    	    PHYSICAL EXAM:    Vital Signs Last 24 Hrs  T(C): 36.8 (31 Dec 2023 14:10), Max: 37.1 (30 Dec 2023 17:30)  T(F): 98.3 (31 Dec 2023 14:10), Max: 98.8 (30 Dec 2023 17:30)  HR: 70 (31 Dec 2023 14:10) (70 - 86)  BP: 130/52 (31 Dec 2023 14:10) (100/62 - 130/52)  BP(mean): 87 (30 Dec 2023 15:30) (87 - 87)  RR: 16 (31 Dec 2023 14:10) (16 - 18)  SpO2: 95% (31 Dec 2023 14:10) (94% - 96%)    Parameters below as of 31 Dec 2023 14:10  Patient On (Oxygen Delivery Method): room air          Gen: arousable W male in NAD    HEENT: NC/AT; conj. injected; mouth--good oral hygiene    Neck: supple    Lymph Nodes: no enlarged submand, cervical or supraclav LNs    Back: no vert or CVA tenderness    Chest/Thorax: poor inspiratory effort    Cardiovascular: S1S2 reg with no m, g, r    ABD: BS active; soft and non-tender to palpation    Genitourinary: no childs    Extremities: no distal LE swelling or erythema    Neurological: arousable and responsive; did not know he was in the hospital; knew is was Dec. but not the year    Skin: no rashes noted    Psychiatric: affect appropriate      LABS/DIAGNOSTIC TESTS:                          8.9    3.74  )-----------( 224      ( 31 Dec 2023 12:05 )             25.4     WBC Count: 3.74 K/uL (12-31 @ 12:05)  WBC Count: 2.13 K/uL (12-31 @ 06:18)  WBC Count: 1.02 K/uL (12-30 @ 10:00)  WBC Count: 1.56 K/uL (12-30 @ 02:30)      12-31    135  |  108  |  25<H>  ----------------------------<  105<H>  3.1<L>   |  19<L>  |  0.96    Ca    7.5<L>      31 Dec 2023 06:18  Phos  2.3     12-31  Mg     1.4     12-31    TPro  4.3<L>  /  Alb  1.8<L>  /  TBili  0.4  /  DBili  x   /  AST  9<L>  /  ALT  12  /  AlkPhos  47  12-31      Urine Microscopic-Add On (NC) (12.30.23 @ 11:56)   Red Blood Cell - Urine: None Seen /HPF  White Blood Cell - Urine: none seen /HPF  Bacteria: Few /HPF  Squamous Epithelial Cells: None Seen    LIVER FUNCTIONS - ( 31 Dec 2023 06:18 )  Alb: 1.8 g/dL / Pro: 4.3 g/dL / ALK PHOS: 47 U/L / ALT: 12 U/L DA / AST: 9 U/L / GGT: x             PT/INR - ( 30 Dec 2023 04:54 )   PT: 19.8 sec;   INR: 1.77 ratio         PTT - ( 30 Dec 2023 04:54 )  PTT:32.9 sec    LACTATE: Lactate, Blood (12.30.23 @ 10:00)   Lactate, Blood: 2.5: Elevated lactate. Consider ordering follow-up lactate to trend. mmol/L      Historical Values  Lactate, Blood (12.30.23 @ 10:00)   Lactate, Blood: 2.5: Elevated lactate. Consider ordering follow-up lactate to trend. mmol/L  Lactate, Blood (12.30.23 @ 07:40)   Lactate, Blood: 2.5: Elevated lactate. Consider ordering follow-up lactate to trend. mmol/L  Lactate, Blood (12.30.23 @ 02:30)   Lactate, Blood: 3.3:      CULTURES:     Culture - Blood (collected 12-30-23 @ 02:04)  Source: .Blood Blood-Peripheral  Preliminary Report (12-31-23 @ 07:03):    No growth at 24 hours    Culture - Blood (collected 12-30-23 @ 01:54)  Source: .Blood Blood-Peripheral  Preliminary Report (12-31-23 @ 07:02):    No growth at 24 hours        RADIOLOGY  < from: Xray Chest 1 View- PORTABLE-Urgent (Xray Chest 1 View- PORTABLE-Urgent .) (12.30.23 @ 02:21) >  ACC: [id number] EXAM:  XR CHEST PORTABLE URGENT 1V   ORDERED BY: RENZO ESPINOSA     PROCEDURE DATE:  12/30/2023          INTERPRETATION:  AP chest on December 30, 2023 at 2:00 AM. Patient has   fever.    Elevated left hemidiaphragm again noted. Heartmagnified by technique.    Present film shows scattered small infiltrates throughout both lung   fields right greater than left. These infiltrates are new since May 7,   2020.    IMPRESSION: Bilateral scattered small infiltrates right greater than   left. These are new since prior.  ______________________________________________________________________    < from: CT Abdomen and Pelvis w/ IV Cont (12.31.23 @ 11:21) >  ACC: [id number] EXAM:  CT ABDOMEN AND PELVIS IC   ORDERED BY: LUPILLO CALVO     PROCEDURE DATE:  12/31/2023          INTERPRETATION:  CLINICAL INFORMATION: Epigastric pain. History of   pancreatic cancer.    COMPARISON: CT 6/26/2023    CONTRAST/COMPLICATIONS:  IV Contrast: Omnipaque 350  90 cc administered   10 cc discarded  Oral Contrast: NONE  Complications: None reported at time of study completion    PROCEDURE:  CT of the Abdomen and Pelvis was performed.  Sagittal and coronal reformats were performed.    FINDINGS:  LOWER CHEST: Small bilateral pleural effusions with adjacent compressive   atelectasis/consolidation. Innumerable nodules at the lung bases   including in the right middle lobe and lingula, consistent with   metastatic disease.    LIVER: Calcified granuloma in the right hepatic lobe inferiorly. No   suspicious liver lesions.  BILE DUCTS: Slightly dilated common bile duct measuring up to 8 mm, new   compared with prior CT 6/26/2023. No obstructing stone or lesion is   identified.  GALLBLADDER: Distended. Gallbladder wall thickening without   pericholecystic fluid or fat stranding.  SPLEEN: Within normal limits.  PANCREAS: Ill-defined hypoattenuating mass in the distal pancreatic body   measuring 4.2 x 2.6 cm (2-37, previously 4.2 x 2.7 cm at a similar level.   Atrophy of the pancreatic tail with upstream pancreatic ductal   dilatation. Lesion abuts the splenic artery and vein without occlusion.   Other vessels are not involved. However, there is nonspecific thickening   along the common hepatic artery, possibly related to duodenal   inflammation.  ADRENALS: Within normal limits.  KIDNEYS/URETERS: 0.7 cm indeterminate hyperdense lesion at the anterior   left lower pole (2-55), not seen on the prior CT. Right interpolar cyst,   stable. No hydronephrosis.    BLADDER: Decompressed with superimposed bladder wall thickening.  REPRODUCTIVE ORGANS: Prostate is enlarged.    BOWEL: No bowel obstruction. Appendix is normal. Duodenal wall thickening   and periduodenal fat stranding, new compared with prior CT, likely   representing acute duodenitis. Stranding extends into the central small   bowel mesentery as well as into the bilateral retroperitoneum.  PERITONEUM: No ascites.  VESSELS: Atherosclerotic changes of the abdominal aorta. Patent portal   and hepatic veins. Patent SMV.  RETROPERITONEUM/LYMPH NODES: No lymphadenopathy.  ABDOMINAL WALL: Within normal limits.  BONES: Degenerative changes.    IMPRESSION:  1.  Findings consistent with acute duodenitis,new compared with prior CT   6/26/2023.  2.  New indeterminate subcentimeter hyperdense lesion in the left lower   pole kidney, possibly representing small neoplasm versus a   proteinaceous/hemorrhagic cyst.  3.  Stable appearance of pancreatic body mass.  4.  Small bilateral pleural effusions. Innumerable metastatic pulmonary   lesions at the lung bases.                       HPI/Course in Hospital (Hx obtained from chart and patient with help from [place]i  #[id number]):    This is an 82 y/o M, from home (HHA 12h/5d), ambulates independently, with PMHx of Pancreatic CA with lung mets (last chemo 12/19 with no port in place), DM, HTN, CAD, and Afib on Eliquis who presents with fever. Pt states he has been feeling chest pain, while pointing at his epigastric area. Per son, pt has had this complaint for 3 days and was evaluated by a doctor who said it was likely heartburn. Son states pt had a temperature of 39C at home along with 2-3 days of not eating or drinking normally. Son also states his father cannot walk without help and has comlpained of occasional shortness of breath. Pt has chronic dizziness per son. Pt denies any sick contacts, headaches, N/V/D, constipation, numbness or tingling.  (30 Dec 2023 06:32). Given empiric Abs b/o concerns for fever in the face of reduced WBC. Received vanco and CTX on admission with antibiotics changed to cefepime and metronidazole b/o CT abd with duodenitis. Pt arousable at the time of my consult; thinks he's home. Denies any complaints.      PAST MEDICAL & SURGICAL HISTORY:  HTN (hypertension)      DM (diabetes mellitus)      MI (myocardial infarction)      Former smoker      MI (myocardial infarction)      PUD (peptic ulcer disease)  remote      Gastritis      Hepatomegaly      HTN (hypertension)      Diabetes      CAD (coronary artery disease)      MI (myocardial infarction)      Hyperlipemia      Diabetes      Myocardial infarct      HTN (hypertension)      No significant past surgical history      H/O heart artery stent          MEDS:  acetaminophen     Tablet .. 650 milliGRAM(s) Oral every 6 hours PRN  aluminum hydroxide/magnesium hydroxide/simethicone Suspension 30 milliLiter(s) Oral every 4 hours PRN  apixaban 2.5 milliGRAM(s) Oral every 12 hours  cefepime   IVPB 2000 milliGRAM(s) IV Intermittent every 12 hours (D2)  dextrose 5% + sodium chloride 0.9% with potassium chloride 20 mEq/L 1000 milliLiter(s) IV Continuous <Continuous>  escitalopram 10 milliGRAM(s) Oral daily  influenza  Vaccine (HIGH DOSE) 0.7 milliLiter(s) IntraMuscular once  insulin lispro (ADMELOG) corrective regimen sliding scale   SubCutaneous three times a day before meals  insulin lispro (ADMELOG) corrective regimen sliding scale   SubCutaneous at bedtime  metroNIDAZOLE  IVPB 500 milliGRAM(s) IV Intermittent every 8 hours (D1)  ondansetron Injectable 4 milliGRAM(s) IV Push every 8 hours PRN  pancrelipase  (CREON 24,000 Lipase Units) 1 Capsule(s) Oral three times a day with meals  pantoprazole  Injectable 40 milliGRAM(s) IV Push every 12 hours      ALLERGIES  No Known Allergies      SOCIAL HISTORY: emigrated from [facility] yrs ago; denies cigs, ETOH; lives with family    FAMILY HISTORY:  Family history of stroke (Father)        ROS: no complaints. ??reliability in view of pt's confusion    	    PHYSICAL EXAM:    Vital Signs Last 24 Hrs  T(C): 36.8 (31 Dec 2023 14:10), Max: 37.1 (30 Dec 2023 17:30)  T(F): 98.3 (31 Dec 2023 14:10), Max: 98.8 (30 Dec 2023 17:30)  HR: 70 (31 Dec 2023 14:10) (70 - 86)  BP: 130/52 (31 Dec 2023 14:10) (100/62 - 130/52)  BP(mean): 87 (30 Dec 2023 15:30) (87 - 87)  RR: 16 (31 Dec 2023 14:10) (16 - 18)  SpO2: 95% (31 Dec 2023 14:10) (94% - 96%)    Parameters below as of 31 Dec 2023 14:10  Patient On (Oxygen Delivery Method): room air          Gen: arousable W male in NAD    HEENT: NC/AT; conj. injected; mouth--good oral hygiene    Neck: supple    Lymph Nodes: no enlarged submand, cervical or supraclav LNs    Back: no vert or CVA tenderness    Chest/Thorax: poor inspiratory effort    Cardiovascular: S1S2 reg with no m, g, r    ABD: BS active; soft and non-tender to palpation    Genitourinary: no childs    Extremities: no distal LE swelling or erythema    Neurological: arousable and responsive; did not know he was in the hospital; knew is was Dec. but not the year    Skin: no rashes noted    Psychiatric: affect appropriate      LABS/DIAGNOSTIC TESTS:                          8.9    3.74  )-----------( 224      ( 31 Dec 2023 12:05 )             25.4     WBC Count: 3.74 K/uL (12-31 @ 12:05)  WBC Count: 2.13 K/uL (12-31 @ 06:18)  WBC Count: 1.02 K/uL (12-30 @ 10:00)  WBC Count: 1.56 K/uL (12-30 @ 02:30)      12-31    135  |  108  |  25<H>  ----------------------------<  105<H>  3.1<L>   |  19<L>  |  0.96    Ca    7.5<L>      31 Dec 2023 06:18  Phos  2.3     12-31  Mg     1.4     12-31    TPro  4.3<L>  /  Alb  1.8<L>  /  TBili  0.4  /  DBili  x   /  AST  9<L>  /  ALT  12  /  AlkPhos  47  12-31      Urine Microscopic-Add On (NC) (12.30.23 @ 11:56)   Red Blood Cell - Urine: None Seen /HPF  White Blood Cell - Urine: none seen /HPF  Bacteria: Few /HPF  Squamous Epithelial Cells: None Seen    LIVER FUNCTIONS - ( 31 Dec 2023 06:18 )  Alb: 1.8 g/dL / Pro: 4.3 g/dL / ALK PHOS: 47 U/L / ALT: 12 U/L DA / AST: 9 U/L / GGT: x             PT/INR - ( 30 Dec 2023 04:54 )   PT: 19.8 sec;   INR: 1.77 ratio         PTT - ( 30 Dec 2023 04:54 )  PTT:32.9 sec    LACTATE: Lactate, Blood (12.30.23 @ 10:00)   Lactate, Blood: 2.5: Elevated lactate. Consider ordering follow-up lactate to trend. mmol/L      Historical Values  Lactate, Blood (12.30.23 @ 10:00)   Lactate, Blood: 2.5: Elevated lactate. Consider ordering follow-up lactate to trend. mmol/L  Lactate, Blood (12.30.23 @ 07:40)   Lactate, Blood: 2.5: Elevated lactate. Consider ordering follow-up lactate to trend. mmol/L  Lactate, Blood (12.30.23 @ 02:30)   Lactate, Blood: 3.3:      CULTURES:     Culture - Blood (collected 12-30-23 @ 02:04)  Source: .Blood Blood-Peripheral  Preliminary Report (12-31-23 @ 07:03):    No growth at 24 hours    Culture - Blood (collected 12-30-23 @ 01:54)  Source: .Blood Blood-Peripheral  Preliminary Report (12-31-23 @ 07:02):    No growth at 24 hours        RADIOLOGY  < from: Xray Chest 1 View- PORTABLE-Urgent (Xray Chest 1 View- PORTABLE-Urgent .) (12.30.23 @ 02:21) >  ACC: [id number] EXAM:  XR CHEST PORTABLE URGENT 1V   ORDERED BY: RENZO ESPINOSA     PROCEDURE DATE:  12/30/2023          INTERPRETATION:  AP chest on December 30, 2023 at 2:00 AM. Patient has   fever.    Elevated left hemidiaphragm again noted. Heartmagnified by technique.    Present film shows scattered small infiltrates throughout both lung   fields right greater than left. These infiltrates are new since May 7,   2020.    IMPRESSION: Bilateral scattered small infiltrates right greater than   left. These are new since prior.  ______________________________________________________________________    < from: CT Abdomen and Pelvis w/ IV Cont (12.31.23 @ 11:21) >  ACC: [id number] EXAM:  CT ABDOMEN AND PELVIS IC   ORDERED BY: LUPILLO CALVO     PROCEDURE DATE:  12/31/2023          INTERPRETATION:  CLINICAL INFORMATION: Epigastric pain. History of   pancreatic cancer.    COMPARISON: CT 6/26/2023    CONTRAST/COMPLICATIONS:  IV Contrast: Omnipaque 350  90 cc administered   10 cc discarded  Oral Contrast: NONE  Complications: None reported at time of study completion    PROCEDURE:  CT of the Abdomen and Pelvis was performed.  Sagittal and coronal reformats were performed.    FINDINGS:  LOWER CHEST: Small bilateral pleural effusions with adjacent compressive   atelectasis/consolidation. Innumerable nodules at the lung bases   including in the right middle lobe and lingula, consistent with   metastatic disease.    LIVER: Calcified granuloma in the right hepatic lobe inferiorly. No   suspicious liver lesions.  BILE DUCTS: Slightly dilated common bile duct measuring up to 8 mm, new   compared with prior CT 6/26/2023. No obstructing stone or lesion is   identified.  GALLBLADDER: Distended. Gallbladder wall thickening without   pericholecystic fluid or fat stranding.  SPLEEN: Within normal limits.  PANCREAS: Ill-defined hypoattenuating mass in the distal pancreatic body   measuring 4.2 x 2.6 cm (2-37, previously 4.2 x 2.7 cm at a similar level.   Atrophy of the pancreatic tail with upstream pancreatic ductal   dilatation. Lesion abuts the splenic artery and vein without occlusion.   Other vessels are not involved. However, there is nonspecific thickening   along the common hepatic artery, possibly related to duodenal   inflammation.  ADRENALS: Within normal limits.  KIDNEYS/URETERS: 0.7 cm indeterminate hyperdense lesion at the anterior   left lower pole (2-55), not seen on the prior CT. Right interpolar cyst,   stable. No hydronephrosis.    BLADDER: Decompressed with superimposed bladder wall thickening.  REPRODUCTIVE ORGANS: Prostate is enlarged.    BOWEL: No bowel obstruction. Appendix is normal. Duodenal wall thickening   and periduodenal fat stranding, new compared with prior CT, likely   representing acute duodenitis. Stranding extends into the central small   bowel mesentery as well as into the bilateral retroperitoneum.  PERITONEUM: No ascites.  VESSELS: Atherosclerotic changes of the abdominal aorta. Patent portal   and hepatic veins. Patent SMV.  RETROPERITONEUM/LYMPH NODES: No lymphadenopathy.  ABDOMINAL WALL: Within normal limits.  BONES: Degenerative changes.    IMPRESSION:  1.  Findings consistent with acute duodenitis,new compared with prior CT   6/26/2023.  2.  New indeterminate subcentimeter hyperdense lesion in the left lower   pole kidney, possibly representing small neoplasm versus a   proteinaceous/hemorrhagic cyst.  3.  Stable appearance of pancreatic body mass.  4.  Small bilateral pleural effusions. Innumerable metastatic pulmonary   lesions at the lung bases.

## 2023-12-31 NOTE — CONSULT NOTE ADULT - TIME BILLING
--chart review, including notes/labs/imaging  --bedside evaluation  --discussion of duodenitis Dx and management with Dr. Marc  --coordination of care related to ID issues in this pt with fever and low WBC

## 2024-01-01 LAB
ALBUMIN SERPL ELPH-MCNC: 1.8 G/DL — LOW (ref 3.5–5)
ALBUMIN SERPL ELPH-MCNC: 1.8 G/DL — LOW (ref 3.5–5)
ALP SERPL-CCNC: 53 U/L — SIGNIFICANT CHANGE UP (ref 40–120)
ALP SERPL-CCNC: 53 U/L — SIGNIFICANT CHANGE UP (ref 40–120)
ALT FLD-CCNC: 12 U/L DA — SIGNIFICANT CHANGE UP (ref 10–60)
ALT FLD-CCNC: 12 U/L DA — SIGNIFICANT CHANGE UP (ref 10–60)
ANION GAP SERPL CALC-SCNC: 7 MMOL/L — SIGNIFICANT CHANGE UP (ref 5–17)
ANION GAP SERPL CALC-SCNC: 7 MMOL/L — SIGNIFICANT CHANGE UP (ref 5–17)
ANISOCYTOSIS BLD QL: SLIGHT — SIGNIFICANT CHANGE UP
ANISOCYTOSIS BLD QL: SLIGHT — SIGNIFICANT CHANGE UP
AST SERPL-CCNC: 9 U/L — LOW (ref 10–40)
AST SERPL-CCNC: 9 U/L — LOW (ref 10–40)
BASOPHILS # BLD AUTO: 0 K/UL — SIGNIFICANT CHANGE UP (ref 0–0.2)
BASOPHILS # BLD AUTO: 0 K/UL — SIGNIFICANT CHANGE UP (ref 0–0.2)
BASOPHILS NFR BLD AUTO: 0 % — SIGNIFICANT CHANGE UP (ref 0–2)
BASOPHILS NFR BLD AUTO: 0 % — SIGNIFICANT CHANGE UP (ref 0–2)
BILIRUB SERPL-MCNC: 0.4 MG/DL — SIGNIFICANT CHANGE UP (ref 0.2–1.2)
BILIRUB SERPL-MCNC: 0.4 MG/DL — SIGNIFICANT CHANGE UP (ref 0.2–1.2)
BLD GP AB SCN SERPL QL: SIGNIFICANT CHANGE UP
BLD GP AB SCN SERPL QL: SIGNIFICANT CHANGE UP
BUN SERPL-MCNC: 21 MG/DL — HIGH (ref 7–18)
BUN SERPL-MCNC: 21 MG/DL — HIGH (ref 7–18)
CALCIUM SERPL-MCNC: 7.3 MG/DL — LOW (ref 8.4–10.5)
CALCIUM SERPL-MCNC: 7.3 MG/DL — LOW (ref 8.4–10.5)
CHLORIDE SERPL-SCNC: 111 MMOL/L — HIGH (ref 96–108)
CHLORIDE SERPL-SCNC: 111 MMOL/L — HIGH (ref 96–108)
CO2 SERPL-SCNC: 21 MMOL/L — LOW (ref 22–31)
CO2 SERPL-SCNC: 21 MMOL/L — LOW (ref 22–31)
CREAT SERPL-MCNC: 0.98 MG/DL — SIGNIFICANT CHANGE UP (ref 0.5–1.3)
CREAT SERPL-MCNC: 0.98 MG/DL — SIGNIFICANT CHANGE UP (ref 0.5–1.3)
DACRYOCYTES BLD QL SMEAR: SLIGHT — SIGNIFICANT CHANGE UP
DACRYOCYTES BLD QL SMEAR: SLIGHT — SIGNIFICANT CHANGE UP
EGFR: 76 ML/MIN/1.73M2 — SIGNIFICANT CHANGE UP
EGFR: 76 ML/MIN/1.73M2 — SIGNIFICANT CHANGE UP
EOSINOPHIL # BLD AUTO: 0.19 K/UL — SIGNIFICANT CHANGE UP (ref 0–0.5)
EOSINOPHIL # BLD AUTO: 0.19 K/UL — SIGNIFICANT CHANGE UP (ref 0–0.5)
EOSINOPHIL NFR BLD AUTO: 4 % — SIGNIFICANT CHANGE UP (ref 0–6)
EOSINOPHIL NFR BLD AUTO: 4 % — SIGNIFICANT CHANGE UP (ref 0–6)
GLUCOSE BLDC GLUCOMTR-MCNC: 117 MG/DL — HIGH (ref 70–99)
GLUCOSE BLDC GLUCOMTR-MCNC: 117 MG/DL — HIGH (ref 70–99)
GLUCOSE BLDC GLUCOMTR-MCNC: 132 MG/DL — HIGH (ref 70–99)
GLUCOSE BLDC GLUCOMTR-MCNC: 132 MG/DL — HIGH (ref 70–99)
GLUCOSE BLDC GLUCOMTR-MCNC: 133 MG/DL — HIGH (ref 70–99)
GLUCOSE BLDC GLUCOMTR-MCNC: 133 MG/DL — HIGH (ref 70–99)
GLUCOSE BLDC GLUCOMTR-MCNC: 148 MG/DL — HIGH (ref 70–99)
GLUCOSE BLDC GLUCOMTR-MCNC: 148 MG/DL — HIGH (ref 70–99)
GLUCOSE SERPL-MCNC: 107 MG/DL — HIGH (ref 70–99)
GLUCOSE SERPL-MCNC: 107 MG/DL — HIGH (ref 70–99)
HCT VFR BLD CALC: 23.5 % — LOW (ref 39–50)
HCT VFR BLD CALC: 23.5 % — LOW (ref 39–50)
HGB BLD-MCNC: 8.2 G/DL — LOW (ref 13–17)
HGB BLD-MCNC: 8.2 G/DL — LOW (ref 13–17)
HYPOCHROMIA BLD QL: SLIGHT — SIGNIFICANT CHANGE UP
HYPOCHROMIA BLD QL: SLIGHT — SIGNIFICANT CHANGE UP
LYMPHOCYTES # BLD AUTO: 1.03 K/UL — SIGNIFICANT CHANGE UP (ref 1–3.3)
LYMPHOCYTES # BLD AUTO: 1.03 K/UL — SIGNIFICANT CHANGE UP (ref 1–3.3)
LYMPHOCYTES # BLD AUTO: 22 % — SIGNIFICANT CHANGE UP (ref 13–44)
LYMPHOCYTES # BLD AUTO: 22 % — SIGNIFICANT CHANGE UP (ref 13–44)
MAGNESIUM SERPL-MCNC: 2.1 MG/DL — SIGNIFICANT CHANGE UP (ref 1.6–2.6)
MAGNESIUM SERPL-MCNC: 2.1 MG/DL — SIGNIFICANT CHANGE UP (ref 1.6–2.6)
MANUAL SMEAR VERIFICATION: SIGNIFICANT CHANGE UP
MANUAL SMEAR VERIFICATION: SIGNIFICANT CHANGE UP
MCHC RBC-ENTMCNC: 29.2 PG — SIGNIFICANT CHANGE UP (ref 27–34)
MCHC RBC-ENTMCNC: 29.2 PG — SIGNIFICANT CHANGE UP (ref 27–34)
MCHC RBC-ENTMCNC: 34.9 GM/DL — SIGNIFICANT CHANGE UP (ref 32–36)
MCHC RBC-ENTMCNC: 34.9 GM/DL — SIGNIFICANT CHANGE UP (ref 32–36)
MCV RBC AUTO: 83.6 FL — SIGNIFICANT CHANGE UP (ref 80–100)
MCV RBC AUTO: 83.6 FL — SIGNIFICANT CHANGE UP (ref 80–100)
MICROCYTES BLD QL: SLIGHT — SIGNIFICANT CHANGE UP
MICROCYTES BLD QL: SLIGHT — SIGNIFICANT CHANGE UP
MONOCYTES # BLD AUTO: 1.21 K/UL — HIGH (ref 0–0.9)
MONOCYTES # BLD AUTO: 1.21 K/UL — HIGH (ref 0–0.9)
MONOCYTES NFR BLD AUTO: 26 % — HIGH (ref 2–14)
MONOCYTES NFR BLD AUTO: 26 % — HIGH (ref 2–14)
MYELOCYTES NFR BLD: 3 % — HIGH (ref 0–0)
MYELOCYTES NFR BLD: 3 % — HIGH (ref 0–0)
NEUTROPHILS # BLD AUTO: 2.1 K/UL — SIGNIFICANT CHANGE UP (ref 1.8–7.4)
NEUTROPHILS # BLD AUTO: 2.1 K/UL — SIGNIFICANT CHANGE UP (ref 1.8–7.4)
NEUTROPHILS NFR BLD AUTO: 43 % — SIGNIFICANT CHANGE UP (ref 43–77)
NEUTROPHILS NFR BLD AUTO: 43 % — SIGNIFICANT CHANGE UP (ref 43–77)
NEUTS BAND # BLD: 2 % — SIGNIFICANT CHANGE UP (ref 0–8)
NEUTS BAND # BLD: 2 % — SIGNIFICANT CHANGE UP (ref 0–8)
NRBC # BLD: 0 /100 WBCS — SIGNIFICANT CHANGE UP (ref 0–0)
NRBC # BLD: 0 /100 WBCS — SIGNIFICANT CHANGE UP (ref 0–0)
PHOSPHATE SERPL-MCNC: 2.4 MG/DL — LOW (ref 2.5–4.5)
PHOSPHATE SERPL-MCNC: 2.4 MG/DL — LOW (ref 2.5–4.5)
PLAT MORPH BLD: NORMAL — SIGNIFICANT CHANGE UP
PLAT MORPH BLD: NORMAL — SIGNIFICANT CHANGE UP
PLATELET # BLD AUTO: 234 K/UL — SIGNIFICANT CHANGE UP (ref 150–400)
PLATELET # BLD AUTO: 234 K/UL — SIGNIFICANT CHANGE UP (ref 150–400)
PLATELET COUNT - ESTIMATE: NORMAL — SIGNIFICANT CHANGE UP
PLATELET COUNT - ESTIMATE: NORMAL — SIGNIFICANT CHANGE UP
POIKILOCYTOSIS BLD QL AUTO: SLIGHT — SIGNIFICANT CHANGE UP
POIKILOCYTOSIS BLD QL AUTO: SLIGHT — SIGNIFICANT CHANGE UP
POTASSIUM SERPL-MCNC: 3.6 MMOL/L — SIGNIFICANT CHANGE UP (ref 3.5–5.3)
POTASSIUM SERPL-MCNC: 3.6 MMOL/L — SIGNIFICANT CHANGE UP (ref 3.5–5.3)
POTASSIUM SERPL-SCNC: 3.6 MMOL/L — SIGNIFICANT CHANGE UP (ref 3.5–5.3)
POTASSIUM SERPL-SCNC: 3.6 MMOL/L — SIGNIFICANT CHANGE UP (ref 3.5–5.3)
PROT SERPL-MCNC: 4.7 G/DL — LOW (ref 6–8.3)
PROT SERPL-MCNC: 4.7 G/DL — LOW (ref 6–8.3)
RBC # BLD: 2.81 M/UL — LOW (ref 4.2–5.8)
RBC # BLD: 2.81 M/UL — LOW (ref 4.2–5.8)
RBC # FLD: 15.2 % — HIGH (ref 10.3–14.5)
RBC # FLD: 15.2 % — HIGH (ref 10.3–14.5)
RBC BLD AUTO: ABNORMAL
RBC BLD AUTO: ABNORMAL
SCHISTOCYTES BLD QL AUTO: SLIGHT — SIGNIFICANT CHANGE UP
SCHISTOCYTES BLD QL AUTO: SLIGHT — SIGNIFICANT CHANGE UP
SODIUM SERPL-SCNC: 139 MMOL/L — SIGNIFICANT CHANGE UP (ref 135–145)
SODIUM SERPL-SCNC: 139 MMOL/L — SIGNIFICANT CHANGE UP (ref 135–145)
WBC # BLD: 4.67 K/UL — SIGNIFICANT CHANGE UP (ref 3.8–10.5)
WBC # BLD: 4.67 K/UL — SIGNIFICANT CHANGE UP (ref 3.8–10.5)
WBC # FLD AUTO: 4.67 K/UL — SIGNIFICANT CHANGE UP (ref 3.8–10.5)
WBC # FLD AUTO: 4.67 K/UL — SIGNIFICANT CHANGE UP (ref 3.8–10.5)

## 2024-01-01 PROCEDURE — 99232 SBSQ HOSP IP/OBS MODERATE 35: CPT

## 2024-01-01 RX ADMIN — Medication 100 MILLIGRAM(S): at 21:53

## 2024-01-01 RX ADMIN — Medication 100 MILLIGRAM(S): at 05:29

## 2024-01-01 RX ADMIN — CEFEPIME 100 MILLIGRAM(S): 1 INJECTION, POWDER, FOR SOLUTION INTRAMUSCULAR; INTRAVENOUS at 18:16

## 2024-01-01 RX ADMIN — PANTOPRAZOLE SODIUM 40 MILLIGRAM(S): 20 TABLET, DELAYED RELEASE ORAL at 05:27

## 2024-01-01 RX ADMIN — CEFEPIME 100 MILLIGRAM(S): 1 INJECTION, POWDER, FOR SOLUTION INTRAMUSCULAR; INTRAVENOUS at 05:27

## 2024-01-01 RX ADMIN — Medication 1 CAPSULE(S): at 18:16

## 2024-01-01 RX ADMIN — PANTOPRAZOLE SODIUM 40 MILLIGRAM(S): 20 TABLET, DELAYED RELEASE ORAL at 18:22

## 2024-01-01 RX ADMIN — Medication 100 MILLIGRAM(S): at 15:28

## 2024-01-01 RX ADMIN — APIXABAN 2.5 MILLIGRAM(S): 2.5 TABLET, FILM COATED ORAL at 18:16

## 2024-01-01 NOTE — PROGRESS NOTE ADULT - SUBJECTIVE AND OBJECTIVE BOX
INTERVAL HPI/OVERNIGHT EVENTS:   Seen and examined with patient's wife and home attendant at bedside. Stable baseline mental status, is playing jokes, pretending to punch me, pretending to be in pain. in overall good spirits. Sometimes has abdominal pain. No fver      REVIEW OF SYSTEMS:negative except per hpi    Vital Signs Last 24 Hrs  T(C): 36.4 (01 Jan 2024 05:33), Max: 36.4 (31 Dec 2023 20:34)  T(F): 97.6 (01 Jan 2024 05:33), Max: 97.6 (01 Jan 2024 05:33)  HR: 69 (01 Jan 2024 05:33) (63 - 69)  BP: 114/59 (01 Jan 2024 05:33) (114/59 - 131/66)  BP(mean): 88 (31 Dec 2023 20:34) (88 - 88)  RR: 20 (01 Jan 2024 05:33) (18 - 20)  SpO2: 96% (01 Jan 2024 05:33) (94% - 96%)    Parameters below as of 01 Jan 2024 05:33  Patient On (Oxygen Delivery Method): room air        PHYSICAL EXAMINATION:  no acute distres, pale appearing, somewhat more participating in conversation, but overall quiet. No abdominal tenderness, abdomen softbowel sounds present. Heart regular lungs clear                            8.2    4.67  )-----------( 234      ( 01 Jan 2024 07:38 )             23.5     01-01    139  |  111<H>  |  21<H>  ----------------------------<  107<H>  3.6   |  21<L>  |  0.98    Ca    7.3<L>      01 Jan 2024 07:38  Phos  2.4     01-01  Mg     2.1     01-01    TPro  4.7<L>  /  Alb  1.8<L>  /  TBili  0.4  /  DBili  x   /  AST  9<L>  /  ALT  12  /  AlkPhos  53  01-01    LIVER FUNCTIONS - ( 01 Jan 2024 07:38 )  Alb: 1.8 g/dL / Pro: 4.7 g/dL / ALK PHOS: 53 U/L / ALT: 12 U/L DA / AST: 9 U/L / GGT: x           CARDIAC MARKERS ( 31 Dec 2023 02:10 )  x     / x     / 36 U/L / x     / <1.0 ng/mL          CAPILLARY BLOOD GLUCOSE      RADIOLOGY & ADDITIONAL TESTS:

## 2024-01-02 ENCOUNTER — TRANSCRIPTION ENCOUNTER (OUTPATIENT)
Age: 84
End: 2024-01-02

## 2024-01-02 VITALS
TEMPERATURE: 98 F | RESPIRATION RATE: 17 BRPM | DIASTOLIC BLOOD PRESSURE: 71 MMHG | HEART RATE: 64 BPM | SYSTOLIC BLOOD PRESSURE: 153 MMHG | OXYGEN SATURATION: 95 %

## 2024-01-02 LAB
ANION GAP SERPL CALC-SCNC: 8 MMOL/L — SIGNIFICANT CHANGE UP (ref 5–17)
ANION GAP SERPL CALC-SCNC: 8 MMOL/L — SIGNIFICANT CHANGE UP (ref 5–17)
BUN SERPL-MCNC: 17 MG/DL — SIGNIFICANT CHANGE UP (ref 7–18)
BUN SERPL-MCNC: 17 MG/DL — SIGNIFICANT CHANGE UP (ref 7–18)
CALCIUM SERPL-MCNC: 7.2 MG/DL — LOW (ref 8.4–10.5)
CALCIUM SERPL-MCNC: 7.2 MG/DL — LOW (ref 8.4–10.5)
CHLORIDE SERPL-SCNC: 111 MMOL/L — HIGH (ref 96–108)
CHLORIDE SERPL-SCNC: 111 MMOL/L — HIGH (ref 96–108)
CO2 SERPL-SCNC: 18 MMOL/L — LOW (ref 22–31)
CO2 SERPL-SCNC: 18 MMOL/L — LOW (ref 22–31)
CREAT SERPL-MCNC: 1.05 MG/DL — SIGNIFICANT CHANGE UP (ref 0.5–1.3)
CREAT SERPL-MCNC: 1.05 MG/DL — SIGNIFICANT CHANGE UP (ref 0.5–1.3)
EGFR: 70 ML/MIN/1.73M2 — SIGNIFICANT CHANGE UP
EGFR: 70 ML/MIN/1.73M2 — SIGNIFICANT CHANGE UP
GLUCOSE BLDC GLUCOMTR-MCNC: 116 MG/DL — HIGH (ref 70–99)
GLUCOSE BLDC GLUCOMTR-MCNC: 116 MG/DL — HIGH (ref 70–99)
GLUCOSE BLDC GLUCOMTR-MCNC: 179 MG/DL — HIGH (ref 70–99)
GLUCOSE BLDC GLUCOMTR-MCNC: 179 MG/DL — HIGH (ref 70–99)
GLUCOSE SERPL-MCNC: 132 MG/DL — HIGH (ref 70–99)
GLUCOSE SERPL-MCNC: 132 MG/DL — HIGH (ref 70–99)
HCT VFR BLD CALC: 25.2 % — LOW (ref 39–50)
HCT VFR BLD CALC: 25.2 % — LOW (ref 39–50)
HGB BLD-MCNC: 8.8 G/DL — LOW (ref 13–17)
HGB BLD-MCNC: 8.8 G/DL — LOW (ref 13–17)
MCHC RBC-ENTMCNC: 28.8 PG — SIGNIFICANT CHANGE UP (ref 27–34)
MCHC RBC-ENTMCNC: 28.8 PG — SIGNIFICANT CHANGE UP (ref 27–34)
MCHC RBC-ENTMCNC: 34.9 GM/DL — SIGNIFICANT CHANGE UP (ref 32–36)
MCHC RBC-ENTMCNC: 34.9 GM/DL — SIGNIFICANT CHANGE UP (ref 32–36)
MCV RBC AUTO: 82.4 FL — SIGNIFICANT CHANGE UP (ref 80–100)
MCV RBC AUTO: 82.4 FL — SIGNIFICANT CHANGE UP (ref 80–100)
NRBC # BLD: 0 /100 WBCS — SIGNIFICANT CHANGE UP (ref 0–0)
NRBC # BLD: 0 /100 WBCS — SIGNIFICANT CHANGE UP (ref 0–0)
PLATELET # BLD AUTO: 232 K/UL — SIGNIFICANT CHANGE UP (ref 150–400)
PLATELET # BLD AUTO: 232 K/UL — SIGNIFICANT CHANGE UP (ref 150–400)
POTASSIUM SERPL-MCNC: 3.2 MMOL/L — LOW (ref 3.5–5.3)
POTASSIUM SERPL-MCNC: 3.2 MMOL/L — LOW (ref 3.5–5.3)
POTASSIUM SERPL-SCNC: 3.2 MMOL/L — LOW (ref 3.5–5.3)
POTASSIUM SERPL-SCNC: 3.2 MMOL/L — LOW (ref 3.5–5.3)
RBC # BLD: 3.06 M/UL — LOW (ref 4.2–5.8)
RBC # BLD: 3.06 M/UL — LOW (ref 4.2–5.8)
RBC # FLD: 15.7 % — HIGH (ref 10.3–14.5)
RBC # FLD: 15.7 % — HIGH (ref 10.3–14.5)
SODIUM SERPL-SCNC: 137 MMOL/L — SIGNIFICANT CHANGE UP (ref 135–145)
SODIUM SERPL-SCNC: 137 MMOL/L — SIGNIFICANT CHANGE UP (ref 135–145)
WBC # BLD: 5.2 K/UL — SIGNIFICANT CHANGE UP (ref 3.8–10.5)
WBC # BLD: 5.2 K/UL — SIGNIFICANT CHANGE UP (ref 3.8–10.5)
WBC # FLD AUTO: 5.2 K/UL — SIGNIFICANT CHANGE UP (ref 3.8–10.5)
WBC # FLD AUTO: 5.2 K/UL — SIGNIFICANT CHANGE UP (ref 3.8–10.5)

## 2024-01-02 PROCEDURE — 83036 HEMOGLOBIN GLYCOSYLATED A1C: CPT

## 2024-01-02 PROCEDURE — 85610 PROTHROMBIN TIME: CPT

## 2024-01-02 PROCEDURE — 87637 SARSCOV2&INF A&B&RSV AMP PRB: CPT

## 2024-01-02 PROCEDURE — 84300 ASSAY OF URINE SODIUM: CPT

## 2024-01-02 PROCEDURE — 81001 URINALYSIS AUTO W/SCOPE: CPT

## 2024-01-02 PROCEDURE — 99232 SBSQ HOSP IP/OBS MODERATE 35: CPT

## 2024-01-02 PROCEDURE — 36415 COLL VENOUS BLD VENIPUNCTURE: CPT

## 2024-01-02 PROCEDURE — 97162 PT EVAL MOD COMPLEX 30 MIN: CPT

## 2024-01-02 PROCEDURE — 87086 URINE CULTURE/COLONY COUNT: CPT

## 2024-01-02 PROCEDURE — 87040 BLOOD CULTURE FOR BACTERIA: CPT

## 2024-01-02 PROCEDURE — 82550 ASSAY OF CK (CPK): CPT

## 2024-01-02 PROCEDURE — 82962 GLUCOSE BLOOD TEST: CPT

## 2024-01-02 PROCEDURE — 86850 RBC ANTIBODY SCREEN: CPT

## 2024-01-02 PROCEDURE — 0225U NFCT DS DNA&RNA 21 SARSCOV2: CPT

## 2024-01-02 PROCEDURE — 83605 ASSAY OF LACTIC ACID: CPT

## 2024-01-02 PROCEDURE — 82570 ASSAY OF URINE CREATININE: CPT

## 2024-01-02 PROCEDURE — 96375 TX/PRO/DX INJ NEW DRUG ADDON: CPT

## 2024-01-02 PROCEDURE — 82553 CREATINE MB FRACTION: CPT

## 2024-01-02 PROCEDURE — 74177 CT ABD & PELVIS W/CONTRAST: CPT

## 2024-01-02 PROCEDURE — 80048 BASIC METABOLIC PNL TOTAL CA: CPT

## 2024-01-02 PROCEDURE — 85730 THROMBOPLASTIN TIME PARTIAL: CPT

## 2024-01-02 PROCEDURE — 93005 ELECTROCARDIOGRAM TRACING: CPT

## 2024-01-02 PROCEDURE — 96376 TX/PRO/DX INJ SAME DRUG ADON: CPT

## 2024-01-02 PROCEDURE — 85025 COMPLETE CBC W/AUTO DIFF WBC: CPT

## 2024-01-02 PROCEDURE — 83735 ASSAY OF MAGNESIUM: CPT

## 2024-01-02 PROCEDURE — 99285 EMERGENCY DEPT VISIT HI MDM: CPT | Mod: 25

## 2024-01-02 PROCEDURE — 86901 BLOOD TYPING SEROLOGIC RH(D): CPT

## 2024-01-02 PROCEDURE — 83690 ASSAY OF LIPASE: CPT

## 2024-01-02 PROCEDURE — 96374 THER/PROPH/DIAG INJ IV PUSH: CPT

## 2024-01-02 PROCEDURE — 87640 STAPH A DNA AMP PROBE: CPT

## 2024-01-02 PROCEDURE — 80053 COMPREHEN METABOLIC PANEL: CPT

## 2024-01-02 PROCEDURE — 87641 MR-STAPH DNA AMP PROBE: CPT

## 2024-01-02 PROCEDURE — 85027 COMPLETE CBC AUTOMATED: CPT

## 2024-01-02 PROCEDURE — 84100 ASSAY OF PHOSPHORUS: CPT

## 2024-01-02 PROCEDURE — 84484 ASSAY OF TROPONIN QUANT: CPT

## 2024-01-02 PROCEDURE — 71045 X-RAY EXAM CHEST 1 VIEW: CPT

## 2024-01-02 PROCEDURE — 86900 BLOOD TYPING SEROLOGIC ABO: CPT

## 2024-01-02 PROCEDURE — 99239 HOSP IP/OBS DSCHRG MGMT >30: CPT

## 2024-01-02 RX ORDER — POTASSIUM CHLORIDE 20 MEQ
40 PACKET (EA) ORAL ONCE
Refills: 0 | Status: COMPLETED | OUTPATIENT
Start: 2024-01-02 | End: 2024-01-02

## 2024-01-02 RX ORDER — CEFPODOXIME PROXETIL 100 MG
1 TABLET ORAL
Qty: 12 | Refills: 0
Start: 2024-01-02 | End: 2024-01-07

## 2024-01-02 RX ORDER — METRONIDAZOLE 500 MG
1 TABLET ORAL
Qty: 18 | Refills: 0
Start: 2024-01-02 | End: 2024-01-07

## 2024-01-02 RX ADMIN — ESCITALOPRAM OXALATE 10 MILLIGRAM(S): 10 TABLET, FILM COATED ORAL at 12:33

## 2024-01-02 RX ADMIN — PANTOPRAZOLE SODIUM 40 MILLIGRAM(S): 20 TABLET, DELAYED RELEASE ORAL at 05:31

## 2024-01-02 RX ADMIN — Medication 1 CAPSULE(S): at 09:01

## 2024-01-02 RX ADMIN — Medication 40 MILLIEQUIVALENT(S): at 12:32

## 2024-01-02 RX ADMIN — APIXABAN 2.5 MILLIGRAM(S): 2.5 TABLET, FILM COATED ORAL at 05:32

## 2024-01-02 RX ADMIN — CEFEPIME 100 MILLIGRAM(S): 1 INJECTION, POWDER, FOR SOLUTION INTRAMUSCULAR; INTRAVENOUS at 05:31

## 2024-01-02 RX ADMIN — Medication 1 CAPSULE(S): at 12:33

## 2024-01-02 RX ADMIN — Medication 100 MILLIGRAM(S): at 05:31

## 2024-01-02 NOTE — CONSULT NOTE ADULT - NS ATTEND AMEND GEN_ALL_CORE FT
# Met Pacreatic Ca:   -Out-pt f/u with Dr. Ghosh; on palliative chemo   # Neutropenia: sec to chemo; resolved; ID recs appreciated   Case d/w primary team attending   Patient discharged; will f/u with Dr. Ghosh

## 2024-01-02 NOTE — DISCHARGE NOTE PROVIDER - NSDCMRMEDTOKEN_GEN_ALL_CORE_FT
cefpodoxime 200 mg oral tablet: 1 tab(s) orally 2 times a day  Creon 24,000 units oral delayed release capsule: 1 cap(s) orally 3 times a day  Eliquis 2.5 mg oral tablet: 1 tab(s) orally 2 times a day  Lexapro 10 mg oral tablet: 1 tab(s) orally once a day  metFORMIN 500 mg oral tablet, extended release: 1 tab(s) orally 2 times a day  metroNIDAZOLE 500 mg oral tablet: 1 tab(s) orally every 8 hours  pantoprazole 40 mg oral delayed release tablet: 1 tab(s) orally once a day

## 2024-01-02 NOTE — DISCHARGE NOTE NURSING/CASE MANAGEMENT/SOCIAL WORK - PATIENT PORTAL LINK FT
You can access the FollowMyHealth Patient Portal offered by [facility] by registering at the following website: [email or web address]. By joining [email or web address]’s FollowMyHealth portal, you will also be able to view your health information using other applications (apps) compatible with our system. You can access the FollowMyHealth Patient Portal offered by [facility] by registering at the following website: [email or web address]. By joining [employer]’s FollowMyHealth portal, you will also be able to view your health information using other applications (apps) compatible with our system.

## 2024-01-02 NOTE — DISCHARGE NOTE PROVIDER - HOSPITAL COURSE
This is an 84 y/o M, from home (HHA 12h/5d), ambulates independently, with PMHx of Pancreatic CA with lung mets (last chemo 12/19), DM, HTN, CAD, and Afib on Eliquis who presents with fever. CT A/P showed duodenitis, and started on antibiotics. Symptoms improved and tolerated regular diet.   Medically optimized for discharge to home with family and PO antibiotics.    Discharge discussed with attending.  Note this is just a brief course for full course please refer to daily progress and consult notes.   This is an 82 y/o M, from home (HHA 12h/5d), ambulates independently, with PMHx of Pancreatic CA with lung mets (last chemo 12/19), DM, HTN, CAD, and Afib on Eliquis who presents with fever. Admitted for neutropenic fever, neutropenia resolved and afebrile now. CT A/P showed duodenitis, and started on antibiotics. Symptoms improved and tolerated regular diet.   Medically optimized for discharge to home with family and PO antibiotics.    Discharge discussed with attending.  Note this is just a brief course for full course please refer to daily progress and consult notes.

## 2024-01-02 NOTE — DISCHARGE NOTE NURSING/CASE MANAGEMENT/SOCIAL WORK - NSDCPEFALRISK_GEN_ALL_CORE
For information on Fall & Injury Prevention, visit: [email or web address] OR  [email or web address]/[email or web address] OR  https://www.cdc.gov/steadi/patient.html For information on Fall & Injury Prevention, visit: [email or web address]://[email or web address]/[email or web address]-[email or web address]-[email or web address]-[email or web address] OR  https://[email or web address]/[email or web address]-prevention-tips-to-avoid-injury OR  https://www.cdc.gov/steadi/patient.html

## 2024-01-02 NOTE — CONSULT NOTE ADULT - ASSESSMENT
complete note to follow    #VTE Prophylaxis   82 y/o M, from home ([facility]HA 12h/5d), ambulates independently, with PMHx of Pancreatic CA with lung mets (last chemo 12/19), DM, HTN, CAD, and Afib on Eliquis who presents with fever. Pt states he has been feeling chest pain, while pointing at his epigastric area. Per son, pt has had this complaint for 3 days and was evaluated by a doctor who said it was likely heartburn. Son states pt had a temperature of 39C at home along with 2-3 days of not eating or drinking normally. Son also states his father cannot walk without help and has comlpained of occasional shortness of breath. Pt has chronic dizziness per son. Pt denies any sick contacts, headaches, N/V/D, constipation, numbness or tingling.     #Met Pancreatic CA  #Neutropenic Fever  #Hx PE- on eliquis  pt follows with Dr. Garcia in [place] [phone], on chemo with [facility]  WBC was 1.5 with  and now 5.2, ANC yesterday was 1,200  CT A/P shows acute duodenitis, new Left kidney lesion, innumerable met pulm lesions  now afebrile, denies pain  Rec's:  SE to chemo  -hold chemo  -neutropenia resolved without G-CSF, no indication for zarxio  -continue eliquis  -on abx, ID following  upon d/c pt to F/U with his primary Oncologist Dr. Garcia    #AMS  ?delirium  mgmt as per Medicine Team      #VTE Prophylaxis    will sign-off at this time, please re-consult if needed  Thank you for the referral. Will continue to monitor the patient.  Please call with any questions [phone]  Above reviewed with Attending Dr. Flores  [facility]  [address] [facility], [address], [place], [place]  [phone]  *Note not finalized until signed by Attending Physician     82 y/o M, from home (HHA 12h/5d), ambulates independently, with PMHx of Pancreatic CA with lung mets (last chemo 12/19), DM, HTN, CAD, and Afib on Eliquis who presents with fever. Pt states he has been feeling chest pain, while pointing at his epigastric area. Per son, pt has had this complaint for 3 days and was evaluated by a doctor who said it was likely heartburn. Son states pt had a temperature of 39C at home along with 2-3 days of not eating or drinking normally. Son also states his father cannot walk without help and has comlpained of occasional shortness of breath. Pt has chronic dizziness per son. Pt denies any sick contacts, headaches, N/V/D, constipation, numbness or tingling.     #Met Pancreatic CA  #Neutropenic Fever  #Hx PE- on eliquis  pt follows with Dr. Garcia in [place] [phone], on chemo with [facility]  WBC was 1.5 with  and now 5.2, ANC yesterday was 1,200  CT A/P shows acute duodenitis, new Left kidney lesion, innumerable met pulm lesions  now afebrile, denies pain  Rec's:  SE to chemo  -hold chemo  -neutropenia resolved without G-CSF, no indication for zarxio  -continue eliquis  -on abx, ID following  upon d/c pt to F/U with his primary Oncologist Dr. Garcia    #AMS  ?delirium  mgmt as per Medicine Team      #VTE Prophylaxis    will sign-off at this time, please re-consult if needed  Thank you for the referral. Will continue to monitor the patient.  Please call with any questions [phone]  Above reviewed with Attending Dr. Flores  [facility]  [address] [facility] [facility], [address], [place], [place]  [phone]  *Note not finalized until signed by Attending Physician

## 2024-01-02 NOTE — DISCHARGE NOTE PROVIDER - NSDCCPCAREPLAN_GEN_ALL_CORE_FT
PRINCIPAL DISCHARGE DIAGNOSIS  Diagnosis: Duodenitis  Assessment and Plan of Treatment: You presented to the hospital with fever and cat scan of abd showed duodenitis whic is inflammation of a part of your intestine. You were started on antibiotics and your symptoms improved.  You will need to complete oral antibiotics  Please follow up with your PCP within 1-2 weeks for continued management.      SECONDARY DISCHARGE DIAGNOSES  Diagnosis: ANA (acute kidney injury)  Assessment and Plan of Treatment: Your kidney function was elevated more than your baseline likley due to infection and dehydration., you were given IV fluids   your kidney function improved and remained stable at your baseline  while your kidneys are healing you should avoid agents that can cause kidney injury.  Some of these agents include NSAIDs, Gadolinium contrast, and Phosphate containing enemas.      Diagnosis: Pancreatic cancer metastasized to lung  Assessment and Plan of Treatment: Please follow up with your oncologist outpatient for continued management.    Diagnosis: DM (diabetes mellitus)  Assessment and Plan of Treatment: Your glucose has been controlled and you should continue to take your home medications as prescribed.  Make sure you get your HgA1c checked every three months.  If you take oral diabetes medications, check your blood glucose two times a day.  Low blood sugar (hypoglycemia) is a blood sugar below 70mg/dl. Check your blood sugar if you feel signs/symptoms of hypoglycemia. If your blood sugar is below 70 take 15 grams of carbohydrates (ex 4 oz of apple juice, 3-4 glucose tablets, or 4-6 oz of regular soda) wait 15 minutes and repeat blood sugar to make sure it comes up above 70.  If your blood sugar is above 70 and you are due for a meal, have a meal.  If you are not due for a meal have a snack.  This snack helps keeps your blood sugar at a safe range.      Diagnosis: Afib  Assessment and Plan of Treatment: Atrial fibrillation is the most common heart rhythm problem & has the risk of stroke & heart attack  It helps if you control your blood pressure, not drink more than 1-2 alcohol drinks per day, cut down on caffeine, getting treatment for over active thyroid gland, & getting exercise  Call your doctor if you feel your heart racing or beating unusually, chest tightness or pain, lightheaded, faint, shortness of breath especially with exercise  It is important to take your heart medication as prescribed  You may be on anticoagulation which is very important to take as directed - you may need blood work to monitor drug levels      Diagnosis: CAD (coronary artery disease)  Assessment and Plan of Treatment: Coronary artery disease is a condition where the arteries the supply the heart muscle gets clogged with fatty deposits & puts you at risk for a heart attack.   Continue with medications as prescribed.   You can help yourself with lifestyle changes (quitting smoking if you smoke), eat lots of fruits & vegetables & low fat dairy products, not a lot of meat & fatty foods, walk or some form of physical activity most days of the week, lose weight if you are overweight  Take your cardiac medication as prescribed to lower cholesterol, to lower blood pressure, aspirin to prevent blood clots, and diabetes control  Make sure to keep appointments with doctor for cardiac follow up care      Diagnosis: ANA (acute kidney injury)  Assessment and Plan of Treatment:      PRINCIPAL DISCHARGE DIAGNOSIS  Diagnosis: Neutropenic fever  Assessment and Plan of Treatment: You presented to the hospital with fever and your neutrophil count was low. CT scan of abd showed duodenitis which is inflammation of a part of your intestine. You were started on antibiotics and your symptoms improved.  You will need to complete oral antibiotics  Please follow up with your PCP within 1-2 weeks for continued management.      SECONDARY DISCHARGE DIAGNOSES  Diagnosis: Duodenitis  Assessment and Plan of Treatment: You presented to the hospital with fever and cat scan of abd showed duodenitis whic is inflammation of a part of your intestine. You were started on antibiotics and your symptoms improved.  You will need to complete oral antibiotics  Please follow up with your PCP within 1-2 weeks for continued management.    Diagnosis: Chronic atrial fibrillation  Assessment and Plan of Treatment: Atrial fibrillation is the most common heart rhythm problem & has the risk of stroke & heart attack  It helps if you control your blood pressure, not drink more than 1-2 alcohol drinks per day, cut down on caffeine, getting treatment for over active thyroid gland, & getting exercise  Call your doctor if you feel your heart racing or beating unusually, chest tightness or pain, lightheaded, faint, shortness of breath especially with exercise  It is important to take your heart medication as prescribed  You may be on anticoagulation which is very important to take as directed - you may need blood work to monitor drug levels    Diagnosis: ANA (acute kidney injury)  Assessment and Plan of Treatment: Your kidney function was elevated more than your baseline likley due to infection and dehydration., you were given IV fluids   your kidney function improved and remained stable at your baseline  while your kidneys are healing you should avoid agents that can cause kidney injury.  Some of these agents include NSAIDs, Gadolinium contrast, and Phosphate containing enemas.      Diagnosis: Pancreatic cancer metastasized to lung  Assessment and Plan of Treatment: Please follow up with your oncologist outpatient for continued management.    Diagnosis: DM (diabetes mellitus)  Assessment and Plan of Treatment: Your glucose has been controlled and you should continue to take your home medications as prescribed.  Make sure you get your HgA1c checked every three months.  If you take oral diabetes medications, check your blood glucose two times a day.  Low blood sugar (hypoglycemia) is a blood sugar below 70mg/dl. Check your blood sugar if you feel signs/symptoms of hypoglycemia. If your blood sugar is below 70 take 15 grams of carbohydrates (ex 4 oz of apple juice, 3-4 glucose tablets, or 4-6 oz of regular soda) wait 15 minutes and repeat blood sugar to make sure it comes up above 70.  If your blood sugar is above 70 and you are due for a meal, have a meal.  If you are not due for a meal have a snack.  This snack helps keeps your blood sugar at a safe range.      Diagnosis: CAD (coronary artery disease)  Assessment and Plan of Treatment: Coronary artery disease is a condition where the arteries the supply the heart muscle gets clogged with fatty deposits & puts you at risk for a heart attack.   Continue with medications as prescribed.   You can help yourself with lifestyle changes (quitting smoking if you smoke), eat lots of fruits & vegetables & low fat dairy products, not a lot of meat & fatty foods, walk or some form of physical activity most days of the week, lose weight if you are overweight  Take your cardiac medication as prescribed to lower cholesterol, to lower blood pressure, aspirin to prevent blood clots, and diabetes control  Make sure to keep appointments with doctor for cardiac follow up care      Diagnosis: Afib  Assessment and Plan of Treatment: Atrial fibrillation is the most common heart rhythm problem & has the risk of stroke & heart attack  It helps if you control your blood pressure, not drink more than 1-2 alcohol drinks per day, cut down on caffeine, getting treatment for over active thyroid gland, & getting exercise  Call your doctor if you feel your heart racing or beating unusually, chest tightness or pain, lightheaded, faint, shortness of breath especially with exercise  It is important to take your heart medication as prescribed  You may be on anticoagulation which is very important to take as directed - you may need blood work to monitor drug levels      Diagnosis: Neutropenic fever  Assessment and Plan of Treatment: You presented to the hospital with fever and cat scan of abd showed duodenitis whic is inflammation of a part of your intestine. You were started on antibiotics and your symptoms improved.  You will need to complete oral antibiotics  Please follow up with your PCP within 1-2 weeks for continued management.

## 2024-01-02 NOTE — PROGRESS NOTE ADULT - ASSESSMENT
83 year old male with PMhx of pancreatic ca on therapy, now with neutropenic fevers.    # Neutropenic fever   # Metastatic Pancreatic Cancer  # Acute Kidney Injury   # T2DM   # HTN    # Protein Energy Malnutrition   #Anemia     Neutrophil count absolute <0.5 on admission  neutropenia improved  CT abdomen with IV contrast performed- duodenitis  - added anaerboic coverage, discontinued vancomycin  - advanced diet, clear  Heme/Onc, [facility] following, plans for filgrastin if with persistent neutropenia and fevers, but neutropenia improving  delmer improving  need pall followup  monitor sugars on sliding scale  pancrealipase  
83 year old male with PMhx of pancreatic ca on therapy, now with neutropenic fevers.    # Neutropenic fever   # Metastatic Pancreatic Cancer  # Acute Kidney Injury   # T2DM   # HTN    # Protein Energy Malnutrition   #Anemia     Neutrophil count absolute <0.5 on admission  CBC abnormally low this AM, was repeated and returned to baseline.   CT abdomen with IV contrast performed- duodenitis  - NPO for bowel rest for today. DC vancomycin start metronidazole, continue with broad spectrum cefepime. PLan discussed with ID, Dr. Washington  [facility]eme/Onc, [facility] following, plans for filgrastin if with persistent neutropenia and fevers, but neutropenia improving  delmer improving  need pall followup  monitor sugars on sliding scale  pancrealipase  
82 y/o M, from home with PMHx of Pancreatic CA with lung mets (last chemo 12/19 -??regimen--with no port in place), DM, HTN, CAD, and Afib on Eliquis who presents with fever and reduced WBC. Rx with empiric Abs b/o fever and low WBC, although pt not absolutely neutropenic. Pt now on cefepime and metronidazole to Rx duodenitis noted on CT abd. WBC now normal. No additional elevated temps since 12/30.    # Fever and low WBC-- did not meet neutropenia definition, and WBC now normal   --Obtain data on type of chemo pt is receiving  --Heme/Onc input    #ANA-- likely ATN. Renal fn improving.   --f/u creat.   --management as per medical team    #Duodenitis-- may be due to chemo (see above)  --d/c cefepime  --start cefpodoxime 200mg p.o. bid x7d  --change metronidazole IV to oral and cont. x7d more    Chart reviewed, including additional notes/labs; re-evaluated pt at the bedside; discussed Dx/management of duodenitis with the patient and the medical team, including Dr. Jacobsen. Please call again if needed.

## 2024-01-02 NOTE — CONSULT NOTE ADULT - SUBJECTIVE AND OBJECTIVE BOX
MEDICATIONS  (STANDING):  apixaban 2.5 milliGRAM(s) Oral every 12 hours  cefepime   IVPB 2000 milliGRAM(s) IV Intermittent every 12 hours  dextrose 5% + sodium chloride 0.9% with potassium chloride 20 mEq/L 1000 milliLiter(s) (75 mL/Hr) IV Continuous <Continuous>  escitalopram 10 milliGRAM(s) Oral daily  influenza  Vaccine (HIGH DOSE) 0.7 milliLiter(s) IntraMuscular once  insulin lispro (ADMELOG) corrective regimen sliding scale   SubCutaneous three times a day before meals  insulin lispro (ADMELOG) corrective regimen sliding scale   SubCutaneous at bedtime  metroNIDAZOLE  IVPB 500 milliGRAM(s) IV Intermittent every 8 hours  pancrelipase  (CREON 24,000 Lipase Units) 1 Capsule(s) Oral three times a day with meals  pantoprazole  Injectable 40 milliGRAM(s) IV Push every 12 hours  potassium chloride    Tablet ER 40 milliEquivalent(s) Oral once    MEDICATIONS  (PRN):  acetaminophen     Tablet .. 650 milliGRAM(s) Oral every 6 hours PRN Temp greater or equal to 38C (100.4F), Mild Pain (1 - 3), Moderate Pain (4 - 6)  aluminum hydroxide/magnesium hydroxide/simethicone Suspension 30 milliLiter(s) Oral every 4 hours PRN Dyspepsia  ondansetron Injectable 4 milliGRAM(s) IV Push every 8 hours PRN Nausea and/or Vomiting    CAPILLARY BLOOD GLUCOSE      POCT Blood Glucose.: 116 mg/dL (02 Jan 2024 08:27)  POCT Blood Glucose.: 132 mg/dL (01 Jan 2024 21:57)  POCT Blood Glucose.: 148 mg/dL (01 Jan 2024 17:26)  POCT Blood Glucose.: 133 mg/dL (01 Jan 2024 14:58)    I&O's Summary      PHYSICAL EXAM:  Vital Signs Last 24 Hrs  T(C): 36.7 (02 Jan 2024 05:34), Max: 36.7 (01 Jan 2024 20:33)  T(F): 98.1 (02 Jan 2024 05:34), Max: 98.1 (02 Jan 2024 05:34)  HR: 64 (02 Jan 2024 05:34) (59 - 64)  BP: 153/71 (02 Jan 2024 05:34) (119/78 - 158/71)  BP(mean): --  RR: 17 (02 Jan 2024 05:34) (16 - 17)  SpO2: 95% (02 Jan 2024 05:34) (95% - 95%)    Parameters below as of 02 Jan 2024 05:34  Patient On (Oxygen Delivery Method): room air        LABS:                        8.8    5.20  )-----------( 232      ( 02 Jan 2024 07:40 )             25.2     01-02    137  |  111<H>  |  17  ----------------------------<  132<H>  3.2<L>   |  18<L>  |  1.05    Ca    7.2<L>      02 Jan 2024 07:40  Phos  2.4     01-01  Mg     2.1     01-01    TPro  4.7<L>  /  Alb  1.8<L>  /  TBili  0.4  /  DBili  x   /  AST  9<L>  /  ALT  12  /  AlkPhos  53  01-01          Urinalysis Basic - ( 02 Jan 2024 07:40 )    Color: x / Appearance: x / SG: x / pH: x  Gluc: 132 mg/dL / Ketone: x  / Bili: x / Urobili: x   Blood: x / Protein: x / Nitrite: x   Leuk Esterase: x / RBC: x / WBC x   Sq Epi: x / Non Sq Epi: x / Bacteria: x        Culture - Urine (collected 30 Dec 2023 11:56)  Source: Clean Catch Clean Catch (Midstream)  Final Report (31 Dec 2023 21:57):    No growth      SARS-CoV-2: NotDetec (29 Dec 2023 22:40)      RADIOLOGY & ADDITIONAL TESTS:       Reason for Admission: Neutropenic Fever  History of Present Illness:   This is an 82 y/o M, from home (HHA 12h/5d), ambulates independently, with PMHx of Pancreatic CA with lung mets (last chemo 12/19), DM, HTN, CAD, and Afib on Eliquis who presents with fever. Pt states he has been feeling chest pain, while pointing at his epigastric area. Per son, pt has had this complaint for 3 days and was evaluated by a doctor who said it was likely heartburn. Son states pt had a temperature of 39C at home along with 2-3 days of not eating or drinking normally. Son also states his father cannot walk without help and has comlpained of occasional shortness of breath. Pt has chronic dizziness per son. Pt denies any sick contacts, headaches, N/V/D, constipation, numbness or tingling.     #[id number]    REVIEW OF SYSTEMS:    CONSTITUTIONAL: No fever, no loss of appetite. no chills, no weight loss, +weakness  EYES: no acute visual disturbances  NECK: No pain or stiffness  RESPIRATORY: No cough; No shortness of breath  CARDIOVASCULAR: No chest pain, no palpitations  GASTROINTESTINAL: No pain. No nausea or vomiting; No diarrhea   NEUROLOGICAL: No headache or numbness, no tremors  MUSCULOSKELETAL: No joint pain, no muscle pain  GENITOURINARY: no dysuria, no frequency, no hesitancy  PSYCHIATRY: no depression, no anxiety  ALL OTHER  ROS negative      Goals of Care:    GOALS OF CARE:  · Participants	Patient; Family  · Child(monique)	Son     Conversation Discussion:  · Conversation	MOLST Discussed  · Conversation Details	GOC conversation held with son and patient. Son states pt does not want CPR or intubation, while patient states he does want CPR. Family states they will have further discussion before coming to a decision regarding CPR.  Also family is unsure if they would like to use pressor medications if worsening of BP. They request more discussion time before coming to a decision, however they will discuss today.  Will continue with FULL CODE for now.      Allergies and Intolerances:        Allergies:  	No Known Allergies:     Home Medications:   * Patient Currently Takes Medications as of 15-Dec-2022 14:57 documented in Structured Notes  · 	losartan 25 mg oral tablet: 1 tab(s) orally once a day   · 	Aspir 81 oral delayed release tablet: 1 tab(s) orally once a day   · 	Eliquis 2.5 mg oral tablet: 1 tab(s) orally 2 times a day  · 	Vitamin D2 50,000 intl units (1.25 mg) oral capsule: 1 cap(s) orally once a week   · 	dicyclomine 10 mg oral capsule: 1 cap(s) orally 4 times a day  · 	Tylenol 500 mg oral tablet: 2 tab(s) orally every 6 hours, As Needed  · 	ranolazine 500 mg oral tablet, extended release: 1 tab(s) orally 2 times a day   · 	metFORMIN 500 mg oral tablet, extended release: 1 tab(s) orally 2 times a day    Patient History:    Past Medical, Past Surgical, and Family History:  PAST MEDICAL HISTORY:  CAD (coronary artery disease)     Diabetes     Diabetes     DM (diabetes mellitus)     Former smoker     Gastritis     Hepatomegaly     HTN (hypertension)     HTN (hypertension)     HTN (hypertension)     Hyperlipemia     MI (myocardial infarction)     MI (myocardial infarction)     MI (myocardial infarction)     Myocardial infarct     PUD (peptic ulcer disease) remote.     PAST SURGICAL HISTORY:  H/O heart artery stent     No significant past surgical history.     FAMILY HISTORY:  Father  Still living? No  Family history of stroke, Age at diagnosis: Age Unknown.     Social History:  · Substance use	No  · Social History (marital status, living situation, occupation, and sexual history)	Former smoker 5pkyr  Denies EtOH use    MEDICATIONS  (STANDING):  apixaban 2.5 milliGRAM(s) Oral every 12 hours  cefepime   IVPB 2000 milliGRAM(s) IV Intermittent every 12 hours  dextrose 5% + sodium chloride 0.9% with potassium chloride 20 mEq/L 1000 milliLiter(s) (75 mL/Hr) IV Continuous <Continuous>  escitalopram 10 milliGRAM(s) Oral daily  influenza  Vaccine (HIGH DOSE) 0.7 milliLiter(s) IntraMuscular once  insulin lispro (ADMELOG) corrective regimen sliding scale   SubCutaneous three times a day before meals  insulin lispro (ADMELOG) corrective regimen sliding scale   SubCutaneous at bedtime  metroNIDAZOLE  IVPB 500 milliGRAM(s) IV Intermittent every 8 hours  pancrelipase  (CREON 24,000 Lipase Units) 1 Capsule(s) Oral three times a day with meals  pantoprazole  Injectable 40 milliGRAM(s) IV Push every 12 hours  potassium chloride    Tablet ER 40 milliEquivalent(s) Oral once    MEDICATIONS  (PRN):  acetaminophen     Tablet .. 650 milliGRAM(s) Oral every 6 hours PRN Temp greater or equal to 38C (100.4F), Mild Pain (1 - 3), Moderate Pain (4 - 6)  aluminum hydroxide/magnesium hydroxide/simethicone Suspension 30 milliLiter(s) Oral every 4 hours PRN Dyspepsia  ondansetron Injectable 4 milliGRAM(s) IV Push every 8 hours PRN Nausea and/or Vomiting    CAPILLARY BLOOD GLUCOSE      POCT Blood Glucose.: 116 mg/dL (02 Jan 2024 08:27)  POCT Blood Glucose.: 132 mg/dL (01 Jan 2024 21:57)  POCT Blood Glucose.: 148 mg/dL (01 Jan 2024 17:26)  POCT Blood Glucose.: 133 mg/dL (01 Jan 2024 14:58)    I&O's Summary      PHYSICAL EXAM:  Vital Signs Last 24 Hrs  T(C): 36.7 (02 Jan 2024 05:34), Max: 36.7 (01 Jan 2024 20:33)  T(F): 98.1 (02 Jan 2024 05:34), Max: 98.1 (02 Jan 2024 05:34)  HR: 64 (02 Jan 2024 05:34) (59 - 64)  BP: 153/71 (02 Jan 2024 05:34) (119/78 - 158/71)  BP(mean): --  RR: 17 (02 Jan 2024 05:34) (16 - 17)  SpO2: 95% (02 Jan 2024 05:34) (95% - 95%)    Parameters below as of 02 Jan 2024 05:34  Patient On (Oxygen Delivery Method): room air    GEN: NAD; A and O x 1-2  LUNGS: CTA B/L  HEART: S1 S2  ABDOMEN: soft, non-tender, non-distended, + BS  EXTREMITIES: no edema        LABS:                        8.8    5.20  )-----------( 232      ( 02 Jan 2024 07:40 )             25.2     01-02    137  |  111<H>  |  17  ----------------------------<  132<H>  3.2<L>   |  18<L>  |  1.05    Ca    7.2<L>      02 Jan 2024 07:40  Phos  2.4     01-01  Mg     2.1     01-01    TPro  4.7<L>  /  Alb  1.8<L>  /  TBili  0.4  /  DBili  x   /  AST  9<L>  /  ALT  12  /  AlkPhos  53  01-01          Urinalysis Basic - ( 02 Jan 2024 07:40 )    Color: x / Appearance: x / SG: x / pH: x  Gluc: 132 mg/dL / Ketone: x  / Bili: x / Urobili: x   Blood: x / Protein: x / Nitrite: x   Leuk Esterase: x / RBC: x / WBC x   Sq Epi: x / Non Sq Epi: x / Bacteria: x        Culture - Urine (collected 30 Dec 2023 11:56)  Source: Clean Catch Clean Catch (Midstream)  Final Report (31 Dec 2023 21:57):    No growth      SARS-CoV-2: NotDetec (29 Dec 2023 22:40)      RADIOLOGY & ADDITIONAL TESTS:    < from: CT Abdomen and Pelvis w/ IV Cont (12.31.23 @ 11:21) >    ACC: [id number] EXAM:  CT ABDOMEN AND PELVIS IC   ORDERED BY: LUPILLO CALVO     PROCEDURE DATE:  12/31/2023          INTERPRETATION:  CLINICAL INFORMATION: Epigastric pain. History of   pancreatic cancer.    COMPARISON: CT 6/26/2023    CONTRAST/COMPLICATIONS:  IV Contrast: Omnipaque 350  90 cc administered   10 cc discarded  Oral Contrast: NONE  Complications: None reported at time of study completion    PROCEDURE:  CT of the Abdomen and Pelvis was performed.  Sagittal and coronal reformats were performed.    FINDINGS:  LOWER CHEST: Small bilateral pleural effusions with adjacent compressive   atelectasis/consolidation. Innumerable nodules at the lung bases   including in the right middle lobe and lingula, consistent with   metastatic disease.    LIVER: Calcified granuloma in the right hepatic lobe inferiorly. No   suspicious liver lesions.  BILE DUCTS: Slightly dilated common bile duct measuring up to 8 mm, new   compared with prior CT 6/26/2023. No obstructing stone or lesion is   identified.  GALLBLADDER: Distended. Gallbladder wall thickening without   pericholecystic fluid or fat stranding.  SPLEEN: Within normal limits.  PANCREAS: Ill-defined hypoattenuating mass in the distal pancreatic body   measuring 4.2 x 2.6 cm (2-37, previously 4.2 x 2.7 cm at a similar level.   Atrophy of the pancreatic tail with upstream pancreatic ductal   dilatation. Lesion abuts the splenic artery and vein without occlusion.   Other vessels are not involved. However, there is nonspecific thickening   along the common hepatic artery, possibly related to duodenal   inflammation.  ADRENALS: Within normal limits.  KIDNEYS/URETERS: 0.7 cm indeterminate hyperdense lesion at the anterior   left lower pole (2-55), not seen on the prior CT. Right interpolar cyst,   stable. No hydronephrosis.    BLADDER: Decompressed with superimposed bladder wall thickening.  REPRODUCTIVE ORGANS: Prostate is enlarged.    BOWEL: No bowel obstruction. Appendix is normal. Duodenal wall thickening   and periduodenal fat stranding, new compared with prior CT, likely   representing acute duodenitis. Stranding extends into the central small   bowel mesentery as well as into the bilateral retroperitoneum.  PERITONEUM: No ascites.  VESSELS: Atherosclerotic changes of the abdominal aorta. Patent portal   and hepatic veins. Patent SMV.  RETROPERITONEUM/LYMPH NODES: No lymphadenopathy.  ABDOMINAL WALL: Within normal limits.  BONES: Degenerative changes.    IMPRESSION:  1.  Findings consistent with acute duodenitis,new compared with prior CT   6/26/2023.  2.  New indeterminate subcentimeter hyperdense lesion in the left lower   pole kidney, possibly representing small neoplasm versus a   proteinaceous/hemorrhagic cyst.  3.  Stable appearance of pancreatic body mass.  4.  Small bilateral pleural effusions. Innumerable metastatic pulmonary   lesions at the lung bases.    < end of copied text >     Reason for Admission: Neutropenic Fever  History of Present Illness:   This is an 84 y/o M, from home (HHA 12h/5d), ambulates independently, with PMHx of Pancreatic CA with lung mets (last chemo 12/19), DM, HTN, CAD, and Afib on Eliquis who presents with fever. Pt states he has been feeling chest pain, while pointing at his epigastric area. Per son, pt has had this complaint for 3 days and was evaluated by a doctor who said it was likely heartburn. Son states pt had a temperature of 39C at home along with 2-3 days of not eating or drinking normally. Son also states his father cannot walk without help and has comlpained of occasional shortness of breath. Pt has chronic dizziness per son. Pt denies any sick contacts, headaches, N/V/D, constipation, numbness or tingling.     #[id number]    REVIEW OF SYSTEMS:    CONSTITUTIONAL: No fever, no loss of appetite. no chills, no weight loss, +weakness  EYES: no acute visual disturbances  NECK: No pain or stiffness  RESPIRATORY: No cough; No shortness of breath  CARDIOVASCULAR: No chest pain, no palpitations  GASTROINTESTINAL: No pain. No nausea or vomiting; No diarrhea   NEUROLOGICAL: No headache or numbness, no tremors  MUSCULOSKELETAL: No joint pain, no muscle pain  GENITOURINARY: no dysuria, no frequency, no hesitancy  PSYCHIATRY: no depression, no anxiety  ALL OTHER  ROS negative      Goals of Care:    GOALS OF CARE:  · Participants	Patient; Family  · Child(monique)	Son     Conversation Discussion:  · Conversation	MOLST Discussed  · Conversation Details	GOC conversation held with son and patient. Son states pt does not want CPR or intubation, while patient states he does want CPR. Family states they will have further discussion before coming to a decision regarding CPR.  Also family is unsure if they would like to use pressor medications if worsening of BP. They request more discussion time before coming to a decision, however they will discuss today.  Will continue with FULL CODE for now.      Allergies and Intolerances:        Allergies:  	No Known Allergies:     Home Medications:   * Patient Currently Takes Medications as of 15-Dec-2022 14:57 documented in Structured Notes  · 	losartan 25 mg oral tablet: 1 tab(s) orally once a day   · 	Aspir 81 oral delayed release tablet: 1 tab(s) orally once a day   · 	Eliquis 2.5 mg oral tablet: 1 tab(s) orally 2 times a day  · 	Vitamin D2 50,000 intl units (1.25 mg) oral capsule: 1 cap(s) orally once a week   · 	dicyclomine 10 mg oral capsule: 1 cap(s) orally 4 times a day  · 	Tylenol 500 mg oral tablet: 2 tab(s) orally every 6 hours, As Needed  · 	ranolazine 500 mg oral tablet, extended release: 1 tab(s) orally 2 times a day   · 	metFORMIN 500 mg oral tablet, extended release: 1 tab(s) orally 2 times a day    Patient History:    Past Medical, Past Surgical, and Family History:  PAST MEDICAL HISTORY:  CAD (coronary artery disease)     Diabetes     Diabetes     DM (diabetes mellitus)     Former smoker     Gastritis     Hepatomegaly     HTN (hypertension)     HTN (hypertension)     HTN (hypertension)     Hyperlipemia     MI (myocardial infarction)     MI (myocardial infarction)     MI (myocardial infarction)     Myocardial infarct     PUD (peptic ulcer disease) remote.     PAST SURGICAL HISTORY:  H/O heart artery stent     No significant past surgical history.     FAMILY HISTORY:  Father  Still living? No  Family history of stroke, Age at diagnosis: Age Unknown.     Social History:  · Substance use	No  · Social History (marital status, living situation, occupation, and sexual history)	Former smoker 5pkyr  Denies EtOH use    MEDICATIONS  (STANDING):  apixaban 2.5 milliGRAM(s) Oral every 12 hours  cefepime   IVPB 2000 milliGRAM(s) IV Intermittent every 12 hours  dextrose 5% + sodium chloride 0.9% with potassium chloride 20 mEq/L 1000 milliLiter(s) (75 mL/Hr) IV Continuous <Continuous>  escitalopram 10 milliGRAM(s) Oral daily  influenza  Vaccine (HIGH DOSE) 0.7 milliLiter(s) IntraMuscular once  insulin lispro (ADMELOG) corrective regimen sliding scale   SubCutaneous three times a day before meals  insulin lispro (ADMELOG) corrective regimen sliding scale   SubCutaneous at bedtime  metroNIDAZOLE  IVPB 500 milliGRAM(s) IV Intermittent every 8 hours  pancrelipase  (CREON 24,000 Lipase Units) 1 Capsule(s) Oral three times a day with meals  pantoprazole  Injectable 40 milliGRAM(s) IV Push every 12 hours  potassium chloride    Tablet ER 40 milliEquivalent(s) Oral once    MEDICATIONS  (PRN):  acetaminophen     Tablet .. 650 milliGRAM(s) Oral every 6 hours PRN Temp greater or equal to 38C (100.4F), Mild Pain (1 - 3), Moderate Pain (4 - 6)  aluminum hydroxide/magnesium hydroxide/simethicone Suspension 30 milliLiter(s) Oral every 4 hours PRN Dyspepsia  ondansetron Injectable 4 milliGRAM(s) IV Push every 8 hours PRN Nausea and/or Vomiting    CAPILLARY BLOOD GLUCOSE      POCT Blood Glucose.: 116 mg/dL (02 Jan 2024 08:27)  POCT Blood Glucose.: 132 mg/dL (01 Jan 2024 21:57)  POCT Blood Glucose.: 148 mg/dL (01 Jan 2024 17:26)  POCT Blood Glucose.: 133 mg/dL (01 Jan 2024 14:58)    I&O's Summary      PHYSICAL EXAM:  Vital Signs Last 24 Hrs  T(C): 36.7 (02 Jan 2024 05:34), Max: 36.7 (01 Jan 2024 20:33)  T(F): 98.1 (02 Jan 2024 05:34), Max: 98.1 (02 Jan 2024 05:34)  HR: 64 (02 Jan 2024 05:34) (59 - 64)  BP: 153/71 (02 Jan 2024 05:34) (119/78 - 158/71)  BP(mean): --  RR: 17 (02 Jan 2024 05:34) (16 - 17)  SpO2: 95% (02 Jan 2024 05:34) (95% - 95%)    Parameters below as of 02 Jan 2024 05:34  Patient On (Oxygen Delivery Method): room air    GEN: NAD; A and O x 1-2  LUNGS: CTA B/L  HEART: S1 S2  ABDOMEN: soft, non-tender, non-distended, + BS  EXTREMITIES: no edema        LABS:                        8.8    5.20  )-----------( 232      ( 02 Jan 2024 07:40 )             25.2     01-02    137  |  111<H>  |  17  ----------------------------<  132<H>  3.2<L>   |  18<L>  |  1.05    Ca    7.2<L>      02 Jan 2024 07:40  Phos  2.4     01-01  Mg     2.1     01-01    TPro  4.7<L>  /  Alb  1.8<L>  /  TBili  0.4  /  DBili  x   /  AST  9<L>  /  ALT  12  /  AlkPhos  53  01-01          Urinalysis Basic - ( 02 Jan 2024 07:40 )    Color: x / Appearance: x / SG: x / pH: x  Gluc: 132 mg/dL / Ketone: x  / Bili: x / Urobili: x   Blood: x / Protein: x / Nitrite: x   Leuk Esterase: x / RBC: x / WBC x   Sq Epi: x / Non Sq Epi: x / Bacteria: x        Culture - Urine (collected 30 Dec 2023 11:56)  Source: Clean Catch Clean Catch (Midstream)  Final Report (31 Dec 2023 21:57):    No growth      SARS-CoV-2: NotDetec (29 Dec 2023 22:40)      RADIOLOGY & ADDITIONAL TESTS:    < from: CT Abdomen and Pelvis w/ IV Cont (12.31.23 @ 11:21) >    ACC: [id number] EXAM:  CT ABDOMEN AND PELVIS IC   ORDERED BY: LUPILLO CALVO     PROCEDURE DATE:  12/31/2023          INTERPRETATION:  CLINICAL INFORMATION: Epigastric pain. History of   pancreatic cancer.    COMPARISON: CT 6/26/2023    CONTRAST/COMPLICATIONS:  IV Contrast: Omnipaque 350  90 cc administered   10 cc discarded  Oral Contrast: NONE  Complications: None reported at time of study completion    PROCEDURE:  CT of the Abdomen and Pelvis was performed.  Sagittal and coronal reformats were performed.    FINDINGS:  LOWER CHEST: Small bilateral pleural effusions with adjacent compressive   atelectasis/consolidation. Innumerable nodules at the lung bases   including in the right middle lobe and lingula, consistent with   metastatic disease.    LIVER: Calcified granuloma in the right hepatic lobe inferiorly. No   suspicious liver lesions.  BILE DUCTS: Slightly dilated common bile duct measuring up to 8 mm, new   compared with prior CT 6/26/2023. No obstructing stone or lesion is   identified.  GALLBLADDER: Distended. Gallbladder wall thickening without   pericholecystic fluid or fat stranding.  SPLEEN: Within normal limits.  PANCREAS: Ill-defined hypoattenuating mass in the distal pancreatic body   measuring 4.2 x 2.6 cm (2-37, previously 4.2 x 2.7 cm at a similar level.   Atrophy of the pancreatic tail with upstream pancreatic ductal   dilatation. Lesion abuts the splenic artery and vein without occlusion.   Other vessels are not involved. However, there is nonspecific thickening   along the common hepatic artery, possibly related to duodenal   inflammation.  ADRENALS: Within normal limits.  KIDNEYS/URETERS: 0.7 cm indeterminate hyperdense lesion at the anterior   left lower pole (2-55), not seen on the prior CT. Right interpolar cyst,   stable. No hydronephrosis.    BLADDER: Decompressed with superimposed bladder wall thickening.  REPRODUCTIVE ORGANS: Prostate is enlarged.    BOWEL: No bowel obstruction. Appendix is normal. Duodenal wall thickening   and periduodenal fat stranding, new compared with prior CT, likely   representing acute duodenitis. Stranding extends into the central small   bowel mesentery as well as into the bilateral retroperitoneum.  PERITONEUM: No ascites.  VESSELS: Atherosclerotic changes of the abdominal aorta. Patent portal   and hepatic veins. Patent SMV.  RETROPERITONEUM/LYMPH NODES: No lymphadenopathy.  ABDOMINAL WALL: Within normal limits.  BONES: Degenerative changes.    IMPRESSION:  1.  Findings consistent with acute duodenitis,new compared with prior CT   6/26/2023.  2.  New indeterminate subcentimeter hyperdense lesion in the left lower   pole kidney, possibly representing small neoplasm versus a   proteinaceous/hemorrhagic cyst.  3.  Stable appearance of pancreatic body mass.  4.  Small bilateral pleural effusions. Innumerable metastatic pulmonary   lesions at the lung bases.    < end of copied text >

## 2024-01-02 NOTE — PROGRESS NOTE ADULT - SUBJECTIVE AND OBJECTIVE BOX
Subjective/Objective: No further elevated temps since 12/30.      MEDS  acetaminophen     Tablet .. 650 milliGRAM(s) Oral every 6 hours PRN  aluminum hydroxide/magnesium hydroxide/simethicone Suspension 30 milliLiter(s) Oral every 4 hours PRN  apixaban 2.5 milliGRAM(s) Oral every 12 hours  cefepime   IVPB 2000 milliGRAM(s) IV Intermittent every 12 hours (D4)  dextrose 5% + sodium chloride 0.9% with potassium chloride 20 mEq/L 1000 milliLiter(s) IV Continuous <Continuous>  escitalopram 10 milliGRAM(s) Oral daily  influenza  Vaccine (HIGH DOSE) 0.7 milliLiter(s) IntraMuscular once  insulin lispro (ADMELOG) corrective regimen sliding scale   SubCutaneous three times a day before meals  insulin lispro (ADMELOG) corrective regimen sliding scale   SubCutaneous at bedtime  metroNIDAZOLE  IVPB 500 milliGRAM(s) IV Intermittent every 8 hours (D3)  ondansetron Injectable 4 milliGRAM(s) IV Push every 8 hours PRN  pancrelipase  (CREON 24,000 Lipase Units) 1 Capsule(s) Oral three times a day with meals  pantoprazole  Injectable 40 milliGRAM(s) IV Push every 12 hours      PHYSICAL EXAM:    Vital Signs Last 24 Hrs  T(C): 36.7 (02 Jan 2024 05:34), Max: 36.7 (01 Jan 2024 20:33)  T(F): 98.1 (02 Jan 2024 05:34), Max: 98.1 (02 Jan 2024 05:34)  HR: 64 (02 Jan 2024 05:34) (59 - 64)  BP: 153/71 (02 Jan 2024 05:34) (119/78 - 158/71)  BP(mean): --  RR: 17 (02 Jan 2024 05:34) (16 - 17)  SpO2: 95% (02 Jan 2024 05:34) (95% - 95%)    Parameters below as of 02 Jan 2024 05:34  Patient On (Oxygen Delivery Method): room air        GEN:    HEENT:    CHEST/Respiratory:    Cardiovascular:    Abdomen:    Genitourinary:    Extremities:     Neurological:    Skin:      LABS/DIAGNOSTIC TESTS                            8.8    5.20  )-----------( 232      ( 02 Jan 2024 07:40 )             25.2       WBC Count: 5.20 K/uL (01-02 @ 07:40)  WBC Count: 4.67 K/uL (01-01 @ 07:38)  WBC Count: 3.74 K/uL (12-31 @ 12:05)  WBC Count: 2.13 K/uL (12-31 @ 06:18)      01-02    137  |  111<H>  |  17  ----------------------------<  132<H>  3.2<L>   |  18<L>  |  1.05    Ca    7.2<L>      02 Jan 2024 07:40  Phos  2.4     01-01  Mg     2.1     01-01    TPro  4.7<L>  /  Alb  1.8<L>  /  TBili  0.4  /  DBili  x   /  AST  9<L>  /  ALT  12  /  AlkPhos  53  01-01      Urinalysis Basic - ( 02 Jan 2024 07:40 )    Color: x / Appearance: x / SG: x / pH: x  Gluc: 132 mg/dL / Ketone: x  / Bili: x / Urobili: x   Blood: x / Protein: x / Nitrite: x   Leuk Esterase: x / RBC: x / WBC x   Sq Epi: x / Non Sq Epi: x / Bacteria: x              CULTURES      Culture - Urine (collected 12-30-23 @ 11:56)  Source: Clean Catch Clean Catch (Midstream)  Final Report (12-31-23 @ 21:57):    No growth    Culture - Blood (collected 12-30-23 @ 02:04)  Source: .Blood Blood-Peripheral  Preliminary Report (01-02-24 @ 07:02):    No growth at 72 Hours    Culture - Blood (collected 12-30-23 @ 01:54)  Source: .Blood Blood-Peripheral  Preliminary Report (01-02-24 @ 07:02):    No growth at 72 Hours          RADIOLOGY               Subjective/Objective ([place]oc[place] interpretation by nurse Lillie on the unit): No further elevated temps since 12/30. Pt with no complaints.      MEDS  acetaminophen     Tablet .. 650 milliGRAM(s) Oral every 6 hours PRN  aluminum hydroxide/magnesium hydroxide/simethicone Suspension 30 milliLiter(s) Oral every 4 hours PRN  apixaban 2.5 milliGRAM(s) Oral every 12 hours  cefepime   IVPB 2000 milliGRAM(s) IV Intermittent every 12 hours (D4)  dextrose 5% + sodium chloride 0.9% with potassium chloride 20 mEq/L 1000 milliLiter(s) IV Continuous <Continuous>  escitalopram 10 milliGRAM(s) Oral daily  influenza  Vaccine (HIGH DOSE) 0.7 milliLiter(s) IntraMuscular once  insulin lispro (ADMELOG) corrective regimen sliding scale   SubCutaneous three times a day before meals  insulin lispro (ADMELOG) corrective regimen sliding scale   SubCutaneous at bedtime  metroNIDAZOLE  IVPB 500 milliGRAM(s) IV Intermittent every 8 hours (D3)  ondansetron Injectable 4 milliGRAM(s) IV Push every 8 hours PRN  pancrelipase  (CREON 24,000 Lipase Units) 1 Capsule(s) Oral three times a day with meals  pantoprazole  Injectable 40 milliGRAM(s) IV Push every 12 hours      PHYSICAL EXAM:    Vital Signs Last 24 Hrs  T(C): 36.7 (02 Jan 2024 05:34), Max: 36.7 (01 Jan 2024 20:33)  T(F): 98.1 (02 Jan 2024 05:34), Max: 98.1 (02 Jan 2024 05:34)  HR: 64 (02 Jan 2024 05:34) (59 - 64)  BP: 153/71 (02 Jan 2024 05:34) (119/78 - 158/71)  BP(mean): --  RR: 17 (02 Jan 2024 05:34) (16 - 17)  SpO2: 95% (02 Jan 2024 05:34) (95% - 95%)    Parameters below as of 02 Jan 2024 05:34  Patient On (Oxygen Delivery Method): room air      Gen: alert, responsive and in NAD    HEENT: NC/AT; conj. injected    Neck: supple    Chest/Thorax: clear to auscultation    Cardiovascular: S1S2 reg with no m, g, r    ABD: BS active; soft and non-tender to palpation    Genitourinary: no childs    Extremities: no distal LE swelling or erythema    Neurological: alert and responsive; knows day of week but not year or name of hospital    Psychiatric: affect appropriate          LABS/DIAGNOSTIC TESTS                        8.8    5.20  )-----------( 232      ( 02 Jan 2024 07:40 )             25.2       WBC Count: 5.20 K/uL (01-02 @ 07:40)  WBC Count: 4.67 K/uL (01-01 @ 07:38)  WBC Count: 3.74 K/uL (12-31 @ 12:05)  WBC Count: 2.13 K/uL (12-31 @ 06:18)      01-02    137  |  111<H>  |  17  ----------------------------<  132<H>  3.2<L>   |  18<L>  |  1.05    Ca    7.2<L>      02 Jan 2024 07:40  Phos  2.4     01-01  Mg     2.1     01-01    TPro  4.7<L>  /  Alb  1.8<L>  /  TBili  0.4  /  DBili  x   /  AST  9<L>  /  ALT  12  /  AlkPhos  53  01-01        CULTURES      Culture - Urine (collected 12-30-23 @ 11:56)  Source: Clean Catch Clean Catch (Midstream)  Final Report (12-31-23 @ 21:57):    No growth    Culture - Blood (collected 12-30-23 @ 02:04)  Source: .Blood Blood-Peripheral  Preliminary Report (01-02-24 @ 07:02):    No growth at 72 Hours    Culture - Blood (collected 12-30-23 @ 01:54)  Source: .Blood Blood-Peripheral  Preliminary Report (01-02-24 @ 07:02):    No growth at 72 Hours          RADIOLOGY               Subjective/Objective ([place]i interpretation by nurse Lillie on the unit): No further elevated temps since 12/30. Pt with no complaints.      MEDS  acetaminophen     Tablet .. 650 milliGRAM(s) Oral every 6 hours PRN  aluminum hydroxide/magnesium hydroxide/simethicone Suspension 30 milliLiter(s) Oral every 4 hours PRN  apixaban 2.5 milliGRAM(s) Oral every 12 hours  cefepime   IVPB 2000 milliGRAM(s) IV Intermittent every 12 hours (D4)  dextrose 5% + sodium chloride 0.9% with potassium chloride 20 mEq/L 1000 milliLiter(s) IV Continuous <Continuous>  escitalopram 10 milliGRAM(s) Oral daily  influenza  Vaccine (HIGH DOSE) 0.7 milliLiter(s) IntraMuscular once  insulin lispro (ADMELOG) corrective regimen sliding scale   SubCutaneous three times a day before meals  insulin lispro (ADMELOG) corrective regimen sliding scale   SubCutaneous at bedtime  metroNIDAZOLE  IVPB 500 milliGRAM(s) IV Intermittent every 8 hours (D3)  ondansetron Injectable 4 milliGRAM(s) IV Push every 8 hours PRN  pancrelipase  (CREON 24,000 Lipase Units) 1 Capsule(s) Oral three times a day with meals  pantoprazole  Injectable 40 milliGRAM(s) IV Push every 12 hours      PHYSICAL EXAM:    Vital Signs Last 24 Hrs  T(C): 36.7 (02 Jan 2024 05:34), Max: 36.7 (01 Jan 2024 20:33)  T(F): 98.1 (02 Jan 2024 05:34), Max: 98.1 (02 Jan 2024 05:34)  HR: 64 (02 Jan 2024 05:34) (59 - 64)  BP: 153/71 (02 Jan 2024 05:34) (119/78 - 158/71)  BP(mean): --  RR: 17 (02 Jan 2024 05:34) (16 - 17)  SpO2: 95% (02 Jan 2024 05:34) (95% - 95%)    Parameters below as of 02 Jan 2024 05:34  Patient On (Oxygen Delivery Method): room air      Gen: alert, responsive and in NAD    HEENT: NC/AT; conj. injected    Neck: supple    Chest/Thorax: clear to auscultation    Cardiovascular: S1S2 reg with no m, g, r    ABD: BS active; soft and non-tender to palpation    Genitourinary: no childs    Extremities: no distal LE swelling or erythema    Neurological: alert and responsive; knows day of week but not year or name of hospital    Psychiatric: affect appropriate          LABS/DIAGNOSTIC TESTS                        8.8    5.20  )-----------( 232      ( 02 Jan 2024 07:40 )             25.2       WBC Count: 5.20 K/uL (01-02 @ 07:40)  WBC Count: 4.67 K/uL (01-01 @ 07:38)  WBC Count: 3.74 K/uL (12-31 @ 12:05)  WBC Count: 2.13 K/uL (12-31 @ 06:18)      01-02    137  |  111<H>  |  17  ----------------------------<  132<H>  3.2<L>   |  18<L>  |  1.05    Ca    7.2<L>      02 Jan 2024 07:40  Phos  2.4     01-01  Mg     2.1     01-01    TPro  4.7<L>  /  Alb  1.8<L>  /  TBili  0.4  /  DBili  x   /  AST  9<L>  /  ALT  12  /  AlkPhos  53  01-01        CULTURES      Culture - Urine (collected 12-30-23 @ 11:56)  Source: Clean Catch Clean Catch (Midstream)  Final Report (12-31-23 @ 21:57):    No growth    Culture - Blood (collected 12-30-23 @ 02:04)  Source: .Blood Blood-Peripheral  Preliminary Report (01-02-24 @ 07:02):    No growth at 72 Hours    Culture - Blood (collected 12-30-23 @ 01:54)  Source: .Blood Blood-Peripheral  Preliminary Report (01-02-24 @ 07:02):    No growth at 72 Hours          RADIOLOGY

## 2024-01-04 LAB
CULTURE RESULTS: SIGNIFICANT CHANGE UP
SPECIMEN SOURCE: SIGNIFICANT CHANGE UP

## 2024-02-26 NOTE — H&P CARDIOLOGY - ALCOHOL USE HISTORY SINGLE SELECT
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 Course complicated by extubation failure and gerry-intubation cardiac arrest requiring VA ECMO cannulation for poor cardiopulmonary function (12/15-). 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 She is s/p TEF repair, and tracheopexy on . Her course has been further complicated by anastomotic leak seen on esophagram with Abx treatment (), now resolved on repeat esophagram (). Extubated on  and dealing with with withdrawl and weaning NIV .     RESP  defer sprints off of CPAP given desats and tachypnea  CPAP; titrate to wob and goal spo2  Mucomyst and Atrovent q12h  Cont Alb/3% every 6 hours  continuous pulse ox; goal spo2>90%    CV  EKGs qM/Th for serial QTc monitoring because of high sedative doses - Last QTc 450  Cont Lasix every 8 hours; goal even to slightly positive    FEN/GI  feeds at goal  PPI     INFECTIOUS DISEASE  Cultures sent on -15 (blood/urine/resp) all negative.  s/p Meropenem (-), CTX  -   ID involved  Monitor fever curve     NEURO  Ativan Q4h, Methadone Q6H. Clonidine increased to 0.15 patch (). Monitor HEMA scores. 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 Course complicated by extubation failure and gerry-intubation cardiac arrest requiring VA ECMO cannulation for poor cardiopulmonary function (12/15-). 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 She is s/p TEF repair, and tracheopexy on . Her course has been further complicated by anastomotic leak seen on esophagram with Abx treatment (), now resolved on repeat esophagram (). Extubated on  and dealing with with withdrawl and weaning NIV .     RESP  Resume CPAP sprints off today.  Trial off x 5 hours  Resume CPAP 6 FiO2 21% at 4 PM  Mucomyst and Atrovent q12h  Cont Alb/3% every 6 hours  continuous pulse ox; goal spo2>90%    CV  EKGs qM/Th for serial QTc monitoring because of high sedative doses - Last QTc 450  Cont Lasix but change to 1 mg/kg/dose q 12 by GT     FEN/GI  feeds at goal  PPI     INFECTIOUS DISEASE  Cultures sent on -15 (blood/urine/resp) all negative.  s/p Meropenem (-), CTX  -   ID involved  Monitor fever curve     NEURO  Ativan IV Q4h, Methadone IV Q6H. Clonidine increased to 0.15 patch (). Monitor HEMA scores. Receiving PRN morphine for high hema scores  Will monitor WATs today.  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 Course complicated by extubation failure and gerry-intubation cardiac arrest requiring VA ECMO cannulation for poor cardiopulmonary function (12/15-). 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 She is s/p TEF repair, and tracheopexy on . Her course has been further complicated by anastomotic leak seen on esophagram with Abx treatment (), now resolved on repeat esophagram (). Extubated on  and dealing with with withdrawl and weaning NIV .     RESP  Resume CPAP sprints off today.  Trial off x 5 hours  Resume CPAP 6 FiO2 21% at 4 PM and resume sprint in AM   Mucomyst and Atrovent q12h  Cont Alb/3% every 6 hours  continuous pulse ox; goal spo2>90%    CV  EKGs qM/ for serial QTc monitoring because of high sedative doses - Last QTc 450  Cont Lasix but change to 1 mg/kg/dose q 12 by GT     FEN/GI  feeds at goal  PPI   No particular fluid goal    INFECTIOUS DISEASE  Cultures sent on -15 (blood/urine/resp) all negative.  s/p Meropenem (-), CTX  -   ID involved  Monitor fever curve   Low grade fever likely secondary to withdrawal    NEURO  Currently on Ativan IV Q4h, Methadone IV Q6H. Clonidine increased to 0.15 patch (). Change Ativan GJ q 4, change Methadone to 4 mg GJ q 6  Continue clonidine patch of 0.15 mg/24 hour  Monitor HEMA scores. Receiving PRN morphine for high hema scores  Wean Seroquel Qd by 50% today and reassess in AM    Melatonin to help with sleep at night  Keppra prophylaxis (initiated post-arrest)   Will need post-arrest MRI for prognostication once stable clinically - will schedule    ACCESS  Left SCN () - switching meds enterally.  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), GJ-tube dependence, chronic respiratory failure with intermittent nocturnal CPAP use who was initially admitted on  for acute-on-chronic respiratory failure due to rhino/enterovirus and superimposed aspiration pneumonia. Course complicated by extubation failure and gerry-intubation cardiac arrest requiring VA ECMO cannulation for poor cardiopulmonary function (12/15-). 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 She is s/p TEF repair, and tracheopexy on . Her course has been further complicated by anastomotic leak seen on esophagram with Abx treatment (), now resolved on repeat esophagram (). Extubated on  and dealing with with withdrawl and weaning NIV .     RESP  Resume CPAP sprints off today.  Trial off x 5 hours  Resume CPAP 6 FiO2 21% at 4 PM and resume sprint in AM   Mucomyst and Atrovent q12h  Cont Alb/3% every 6 hours  continuous pulse ox; goal spo2>90%    CV  EKGs qM/ for serial QTc monitoring because of high sedative doses - Last QTc 450  Cont Lasix but change to 1 mg/kg/dose q 12 by GT     FEN/GI  feeds at goal  PPI   No particular fluid goal    INFECTIOUS DISEASE  Cultures sent on -15 (blood/urine/resp) all negative.  s/p Meropenem (-), CTX  -   ID involved  Monitor fever curve   Low grade fever likely secondary to withdrawal    NEURO  Currently on Ativan IV Q4h, Methadone IV Q6H. Clonidine increased to 0.15 patch ().   Change Ativan GJ q 4, change Methadone to 4 mg GJ q 6 (50% increase to adjust for change in route)  Continue clonidine patch of 0.15 mg/24 hour  Monitor HEMA scores. Receiving PRN morphine for high hema scores  Wean Seroquel Qd by 50% today and reassess in AM    Melatonin to help with sleep at night  Keppra prophylaxis (initiated post-arrest)   Will need post-arrest MRI for prognostication once stable clinically - will schedule    ACCESS  Left SCN () - switching meds enterally.  Monitor enteral tolerance, monitor for emesis  Obtain PIV access  Will work on d/c central access within the next 24 hours    Social  Parents updated as to patient status.  Will continue on working on trials off CPAP, adjusting withdrawal prophylaxis regimen.  If things go well patient may be ready for subacute rehab facility in a week's time.  Will reach out to other subspecialty services to update them as to current discharge planning.   never

## 2024-03-21 ENCOUNTER — EMERGENCY (EMERGENCY)
Facility: HOSPITAL | Age: 84
LOS: 1 days | Discharge: HOME | End: 2024-03-21
Attending: STUDENT IN AN ORGANIZED HEALTH CARE EDUCATION/TRAINING PROGRAM
Payer: MEDICARE

## 2024-03-21 VITALS
WEIGHT: 154.98 LBS | DIASTOLIC BLOOD PRESSURE: 72 MMHG | OXYGEN SATURATION: 96 % | HEIGHT: 68 IN | RESPIRATION RATE: 16 BRPM | SYSTOLIC BLOOD PRESSURE: 177 MMHG | TEMPERATURE: 98 F | HEART RATE: 55 BPM

## 2024-03-21 VITALS
RESPIRATION RATE: 17 BRPM | OXYGEN SATURATION: 95 % | TEMPERATURE: 98 F | DIASTOLIC BLOOD PRESSURE: 59 MMHG | SYSTOLIC BLOOD PRESSURE: 122 MMHG | HEART RATE: 92 BPM

## 2024-03-21 DIAGNOSIS — Z95.5 PRESENCE OF CORONARY ANGIOPLASTY IMPLANT AND GRAFT: Chronic | ICD-10-CM

## 2024-03-21 PROCEDURE — 71250 CT THORAX DX C-: CPT | Mod: MC

## 2024-03-21 PROCEDURE — 99284 EMERGENCY DEPT VISIT MOD MDM: CPT

## 2024-03-21 PROCEDURE — 71250 CT THORAX DX C-: CPT | Mod: 26,MC

## 2024-03-21 PROCEDURE — 99284 EMERGENCY DEPT VISIT MOD MDM: CPT | Mod: 25

## 2024-03-21 RX ORDER — ACETAMINOPHEN 500 MG
975 TABLET ORAL ONCE
Refills: 0 | Status: COMPLETED | OUTPATIENT
Start: 2024-03-21 | End: 2024-03-21

## 2024-03-21 RX ORDER — LIDOCAINE 4 G/100G
1 CREAM TOPICAL ONCE
Refills: 0 | Status: COMPLETED | OUTPATIENT
Start: 2024-03-21 | End: 2024-03-21

## 2024-03-21 RX ADMIN — LIDOCAINE 1 PATCH: 4 CREAM TOPICAL at 10:35

## 2024-03-21 RX ADMIN — Medication 975 MILLIGRAM(S): at 10:32

## 2024-03-21 NOTE — ED PROVIDER NOTE - PHYSICAL EXAMINATION
General: well appearing male, no acute distress   HEENT: normocephalic, atraumatic   Respiratory: normal work of breathing  Cardiac: regular rate and rhythm   MSK: left chest wall tenderness to palpation   Skin: warm, dry, no rashes   Neuro: A&Ox3  Psych: appropriate affect

## 2024-03-21 NOTE — ED PROVIDER NOTE - NSFOLLOWUPINSTRUCTIONS_ED_ALL_ED_FT
You were seen in the emergency department for chest wall pain after a fall.     Please follow-up with your primary care doctor within the next 24-48 hours.    Please take Ibuprofen and Tylenol as prescribed on the bottles for pain control.     If you have any worsening symptoms, severe headache, chest pain, trouble breathing, please return to the emergency department.

## 2024-03-21 NOTE — ED PROVIDER NOTE - CLINICAL SUMMARY MEDICAL DECISION MAKING FREE TEXT BOX
82 male presenting with left-sided rib pain after a fall.  Has tenderness on exam.  Concern for rib fractures will obtain CT scan to assess.

## 2024-03-21 NOTE — ED PROVIDER NOTE - OBJECTIVE STATEMENT
83-year-old male presenting with left-sided rib pain after a fall.  Aide at bedside reports the patient fell onto his left side 2 days ago.  Patient did not hit his head or lose consciousness.,  No headache, dizziness, nausea or vomiting.

## 2024-03-21 NOTE — ED PROVIDER NOTE - PATIENT PORTAL LINK FT
You can access the FollowMyHealth Patient Portal offered by [facility] [place] by registering at the following website: [email or web address]. By joining [facility]’s FollowMyHealth portal, you will also be able to view your health information using other applications (apps) compatible with our system.

## 2024-03-21 NOTE — ED PROVIDER NOTE - PROGRESS NOTE DETAILS
patient and aide updated on results. known h/o cancer. stakayley for outpatient followup. Richi Gaviria

## 2024-03-21 NOTE — ED ADULT NURSE NOTE - NSFALLHARMRISKINTERV_ED_ALL_ED

## 2024-04-02 ENCOUNTER — EMERGENCY (EMERGENCY)
Facility: HOSPITAL | Age: 84
LOS: 1 days | Discharge: HOME | End: 2024-04-02
Attending: EMERGENCY MEDICINE
Payer: MEDICARE

## 2024-04-02 VITALS
DIASTOLIC BLOOD PRESSURE: 67 MMHG | SYSTOLIC BLOOD PRESSURE: 142 MMHG | HEART RATE: 62 BPM | TEMPERATURE: 98 F | WEIGHT: 145.06 LBS | HEIGHT: 68 IN | OXYGEN SATURATION: 98 % | RESPIRATION RATE: 16 BRPM

## 2024-04-02 DIAGNOSIS — Z95.5 PRESENCE OF CORONARY ANGIOPLASTY IMPLANT AND GRAFT: Chronic | ICD-10-CM

## 2024-04-02 LAB
ALBUMIN SERPL ELPH-MCNC: 2.9 G/DL — LOW (ref 3.5–5)
ALP SERPL-CCNC: 87 U/L — SIGNIFICANT CHANGE UP (ref 40–120)
ALT FLD-CCNC: 27 U/L DA — SIGNIFICANT CHANGE UP (ref 10–60)
ANION GAP SERPL CALC-SCNC: 12 MMOL/L — SIGNIFICANT CHANGE UP (ref 5–17)
AST SERPL-CCNC: 21 U/L — SIGNIFICANT CHANGE UP (ref 10–40)
BASOPHILS # BLD AUTO: 0.02 K/UL — SIGNIFICANT CHANGE UP (ref 0–0.2)
BASOPHILS NFR BLD AUTO: 0.3 % — SIGNIFICANT CHANGE UP (ref 0–2)
BILIRUB SERPL-MCNC: 0.4 MG/DL — SIGNIFICANT CHANGE UP (ref 0.2–1.2)
BUN SERPL-MCNC: 19 MG/DL — HIGH (ref 7–18)
CALCIUM SERPL-MCNC: 8.2 MG/DL — LOW (ref 8.4–10.5)
CHLORIDE SERPL-SCNC: 105 MMOL/L — SIGNIFICANT CHANGE UP (ref 96–108)
CO2 SERPL-SCNC: 18 MMOL/L — LOW (ref 22–31)
CREAT SERPL-MCNC: 1.21 MG/DL — SIGNIFICANT CHANGE UP (ref 0.5–1.3)
EGFR: 59 ML/MIN/1.73M2 — LOW
EOSINOPHIL # BLD AUTO: 0 K/UL — SIGNIFICANT CHANGE UP (ref 0–0.5)
EOSINOPHIL NFR BLD AUTO: 0 % — SIGNIFICANT CHANGE UP (ref 0–6)
GLUCOSE SERPL-MCNC: 199 MG/DL — HIGH (ref 70–99)
HCT VFR BLD CALC: 25.6 % — LOW (ref 39–50)
HGB BLD-MCNC: 8.6 G/DL — LOW (ref 13–17)
IMM GRANULOCYTES NFR BLD AUTO: 0.4 % — SIGNIFICANT CHANGE UP (ref 0–0.9)
LYMPHOCYTES # BLD AUTO: 0.59 K/UL — LOW (ref 1–3.3)
LYMPHOCYTES # BLD AUTO: 8.2 % — LOW (ref 13–44)
MCHC RBC-ENTMCNC: 29 PG — SIGNIFICANT CHANGE UP (ref 27–34)
MCHC RBC-ENTMCNC: 33.6 GM/DL — SIGNIFICANT CHANGE UP (ref 32–36)
MCV RBC AUTO: 86.2 FL — SIGNIFICANT CHANGE UP (ref 80–100)
MONOCYTES # BLD AUTO: 0.49 K/UL — SIGNIFICANT CHANGE UP (ref 0–0.9)
MONOCYTES NFR BLD AUTO: 6.8 % — SIGNIFICANT CHANGE UP (ref 2–14)
NEUTROPHILS # BLD AUTO: 6.05 K/UL — SIGNIFICANT CHANGE UP (ref 1.8–7.4)
NEUTROPHILS NFR BLD AUTO: 84.3 % — HIGH (ref 43–77)
NRBC # BLD: 0 /100 WBCS — SIGNIFICANT CHANGE UP (ref 0–0)
PLATELET # BLD AUTO: 245 K/UL — SIGNIFICANT CHANGE UP (ref 150–400)
POTASSIUM SERPL-MCNC: 4.1 MMOL/L — SIGNIFICANT CHANGE UP (ref 3.5–5.3)
POTASSIUM SERPL-SCNC: 4.1 MMOL/L — SIGNIFICANT CHANGE UP (ref 3.5–5.3)
PROT SERPL-MCNC: 6.2 G/DL — SIGNIFICANT CHANGE UP (ref 6–8.3)
RBC # BLD: 2.97 M/UL — LOW (ref 4.2–5.8)
RBC # FLD: 15.4 % — HIGH (ref 10.3–14.5)
SODIUM SERPL-SCNC: 135 MMOL/L — SIGNIFICANT CHANGE UP (ref 135–145)
TROPONIN I, HIGH SENSITIVITY RESULT: 11.9 NG/L — SIGNIFICANT CHANGE UP
WBC # BLD: 7.18 K/UL — SIGNIFICANT CHANGE UP (ref 3.8–10.5)
WBC # FLD AUTO: 7.18 K/UL — SIGNIFICANT CHANGE UP (ref 3.8–10.5)

## 2024-04-02 PROCEDURE — 80053 COMPREHEN METABOLIC PANEL: CPT

## 2024-04-02 PROCEDURE — 36415 COLL VENOUS BLD VENIPUNCTURE: CPT

## 2024-04-02 PROCEDURE — 71045 X-RAY EXAM CHEST 1 VIEW: CPT | Mod: 26

## 2024-04-02 PROCEDURE — 84484 ASSAY OF TROPONIN QUANT: CPT

## 2024-04-02 PROCEDURE — 99285 EMERGENCY DEPT VISIT HI MDM: CPT | Mod: 25

## 2024-04-02 PROCEDURE — 99285 EMERGENCY DEPT VISIT HI MDM: CPT

## 2024-04-02 PROCEDURE — 85025 COMPLETE CBC W/AUTO DIFF WBC: CPT

## 2024-04-02 PROCEDURE — 83880 ASSAY OF NATRIURETIC PEPTIDE: CPT

## 2024-04-02 PROCEDURE — 93005 ELECTROCARDIOGRAM TRACING: CPT

## 2024-04-02 PROCEDURE — 71045 X-RAY EXAM CHEST 1 VIEW: CPT

## 2024-04-02 NOTE — ED ADULT NURSE NOTE - CAS DISC DELAYS
[address] [facility] [address] 
 
 
 [address], [address] [place] [address] [facility] 
[phone] Patient: Kenrick Biswas MRN: [id number] :1964 Visit Information Date & Time Provider Department Dept. Phone Encounter #  
 3/20/2018  8:30 AM Patti Bui MD [facility] [address] [place] [address] [id number] Follow-up Instructions Return in about 1 year (around 3/20/2019) for BP check, autoimmune hepatitis. Your Appointments 3/28/2018  8:00 AM  
Follow Up with [place] [address] [facility] ([address]) Appt Note: 6mnth f/up [facility], [facility] [address] [address]  
[phone]  
  
   
 [address] [facility] Diane Brooke 707 [address] [address]  
  
    
 3/15/2019  7:55 AM  
LAB with [facility] NURSE VISIT [facility] [address] [place] [address] ([address] [facility]) Appt Note: labs [address], [address] [place] Pulling [address] [place]  
  
   
 [address], [address]  
  
    
 3/22/2019  8:30 AM  
Office Visit with Patti Bui MD  
[facility] [address] [place] [address] [address] [place] [address]) Appt Note: 1 yr. f/u  
 [address] [address] Geradine Pulling [address] [place]  
  
   
 [address], [address] Upcoming Health Maintenance Date Due Hepatitis C Screening 1964 FOBT Q 1 YEAR AGE 50-75 2013 PAP AKA CERVICAL CYTOLOGY 5/10/2016 DTaP/Tdap/Td series (2 - Td) 2018 BREAST CANCER SCRN MAMMOGRAM 2020 Allergies as of 3/20/2018  Review Complete On: 3/20/2018 By: Patti Bui MD  
  
 Severity Noted Reaction Type Reactions Bactrim [Sulfamethoprim Ds]  2014    Other (comments) Current Immunizations  Never Reviewed Name Date Tdap 2008 Not reviewed this visit Vitals BP Pulse Temp Resp Height(growth percentile) Weight(growth percentile) 167/88 (!) 103 97.5 °F (36.4 °C) 16 5' 4\" (1.626 m) 180 lb (81.6 kg) SpO2 BMI OB Status Smoking Status 100% 30.9 kg/m2 Postmenopausal Never Smoker Vitals History BMI and BSA Data Body Mass Index Body Surface Area 30.9 kg/m 2 1.92 m 2 Preferred Pharmacy Pharmacy Name Phone [address] [address] Ellen Godoy [phone] Your Updated Medication List  
  
   
This list is accurate as of 3/20/18  9:10 AM.  Always use your most recent med list.  
  
  
  
  
 DAILY MULTI-VITAMINS/IRON tablet Generic drug:  multivitamin with iron Take 1 Tab by mouth daily. ferrous fumarate 324 mg (106 mg iron) Tab Take  by mouth.  
  
 ketoconazole 2 % topical cream  
Commonly known as:  NIZORAL Apply  to affected area daily. mycophenolate 500 mg tablet Commonly known as:  CELLCEPT  
TAKE 1 TABLET BY MOUTH TWICE DAILY  
  
 VITAMIN D3 1,000 unit Cap Generic drug:  cholecalciferol Take  by mouth daily. Follow-up Instructions Return in about 1 year (around 3/20/2019) for BP check, autoimmune hepatitis. Patient Instructions MRI of the Breast: About This Test 
What is it? MRI (magnetic resonance imaging) is a test that uses a magnetic field and pulses of radio wave energy to make pictures of the organs and structures inside the body. An MRI can give your doctor information about your breasts, chest wall, and underarm. When you have an MRI, you lie on a table and the table moves into the MRI machine. Why is this test done? An MRI of the breast can help find breast cancer and how far along it is (its stage). It can also look for infection. How can you prepare for the test? 
Talk to your doctor about all your health conditions before the test. For example, tell your doctor if: 
· You are allergic to any medicines. · You are or might be pregnant. · You have a pacemaker, an artificial limb, any metal pins or metal parts in your body, metal heart valves, metal clips in your brain, metal implants in your ears, or any other implanted or prosthetic medical device. · You have an intrauterine device (IUD) in place. · You get nervous in confined spaces. You may need medicine to help you relax. · You wear a patch that contains medicine. · You have kidney disease. What happens before the test? 
· You will remove all metal objects, such as hearing aids, dentures, jewelry, watches, and hairpins. · You will need to take off your clothes above the waist. You will be given a gown to cover your shoulders during the test. Make sure you take everything out of your pockets. · You will probably have contrast material (dye) put into your arm through a tube called an IV. Contrast material helps doctors see specific organs, blood vessels, and most tumors. What happens during the test? 
· You will lie on your stomach on a table that is part of the MRI scanner. Straps may be used to help keep your body in the best position. · The table will slide into the space that contains the magnet. A device called a coil will be placed over or wrapped around the breast area. · Inside the scanner you will hear a fan and feel air moving. You may hear tapping, thumping, or snapping noises. You may be given earplugs or headphones to reduce the noise. · You will be asked to hold still during the scan. You may be asked to hold your breath for short periods. · You may be alone in the scanning room, but a technologist will be watching you through a window and talking with you during the test. 
What else should you know about the test? 
· An MRI does not hurt. · If a dye is used, you may feel a quick sting or pinch and some coolness when the IV is started. The dye may give you a metallic taste in your mouth. Some people feel sick to their stomach or get a headache. · If you breastfeed and are concerned about whether the dye used in this test is safe, talk to your doctor. Most experts believe that very little dye passes into breast milk and even less is passed on to the baby. But if you prefer, you can store some of your breast milk ahead of time and use it for a day or two after the test. 
· You may feel warmth in the area being examined. This is normal. 
How long does the test take? · The test usually takes 30 to 60 minutes but can take as long as 2 hours. What happens after the test? 
· You will probably be able to go home right away, depending on the reason for the test. 
· You can go back to your usual activities right away. Follow-up care is a key part of your treatment and safety. Be sure to make and go to all appointments, and call your doctor if you are having problems. It's also a good idea to keep a list of the medicines you take. Ask your doctor when you can expect to have your test results. Where can you learn more? Go to http://tom-javi.info/. Enter B623 in the search box to learn more about \"MRI of the Breast: About This Test.\" Current as of: October 14, 2016 Content Version: 11.4 © 5849-1535 Healthwise, Incorporated. Care instructions adapted under license by [employer] (which disclaims liability or warranty for this information). If you have questions about a medical condition or this instruction, always ask your healthcare professional. [address] [address] any warranty or liability for your use of this information. Introducing [facility]! Dear Cody Argueta: Thank you for requesting a [employer] account. Our records indicate that you already have an active [employer] account. You can access your account anytime at [email or web address] Did you know that you can access your hospital and ER discharge instructions at any time in [employer]?   You can also review all of your test results from your hospital stay or ER visit. Additional Information If you have questions, please visit the Frequently Asked Questions section of the [employer] website at [email or web address] Remember, [employer] is NOT to be used for urgent needs. For medical emergencies, dial 911. Now available from your iPhone and Android! Please provide this summary of care documentation to your next provider. Your primary care clinician is listed as Cornelia Soto. If you have any questions after today's visit, please call [phone]. Waiting ambulance service

## 2024-04-02 NOTE — ED ADULT NURSE NOTE - NSFALLHARMRISKINTERV_ED_ALL_ED

## 2024-04-03 VITALS
OXYGEN SATURATION: 97 % | RESPIRATION RATE: 16 BRPM | HEART RATE: 66 BPM | SYSTOLIC BLOOD PRESSURE: 179 MMHG | DIASTOLIC BLOOD PRESSURE: 72 MMHG | TEMPERATURE: 98 F

## 2024-04-03 LAB — NT-PROBNP SERPL-SCNC: 2646 PG/ML — HIGH (ref 0–450)

## 2024-04-03 NOTE — ED PROVIDER NOTE - OBJECTIVE STATEMENT
83-year-old male pancreatic cancer with metastasis currently undergoing treatment presents with continued left-sided chest pain.  Patient had a mechanical fall 2 weeks ago came to the ER had a CT scan of the chest the time.  CT scan did not reveal any rib fractures.  Patient reports continued pain and is concerned therefore came back for reevaluation.

## 2024-04-03 NOTE — ED PROVIDER NOTE - PHYSICAL EXAMINATION
Heart regular lungs clear abdomen soft nontender mild left chest wall tenderness palpation.  Patient is awake not in any distress.  Noted some resolving ecchymosis to left forearm.  No bony tenderness palpation.

## 2024-04-03 NOTE — ED PROVIDER NOTE - PATIENT PORTAL LINK FT
You can access the FollowMyHealth Patient Portal offered by [facility] by registering at the following website: [email or web address]. By joining [employer]’s FollowMyHealth portal, you will also be able to view your health information using other applications (apps) compatible with our system.

## 2024-04-03 NOTE — ED PROVIDER NOTE - NSFOLLOWUPINSTRUCTIONS_ED_ALL_ED_FT
Chest Wall Pain  Chest wall pain is pain in or around the bones and muscles of your chest. Sometimes, an injury causes this pain. Excessive coughing or overuse of arm and chest muscles may also cause chest wall pain. Sometimes, the cause may not be known. This pain may take several weeks or longer to get better.    Follow these instructions at home:  Managing pain, stiffness, and swelling    A bag of ice on a towel on the skin.  If directed, put ice on the painful area:  Put ice in a plastic bag.  Place a towel between your skin and the bag.  Leave the ice on for 20 minutes, 2–3 times per day.  Activity    Rest as told by your health care provider.  Avoid activities that cause pain. These include any activities that use your chest muscles or your abdominal and side muscles to lift heavy items. Ask your health care provider what activities are safe for you.  General instructions    A do not smoke cigarettes sign.  Take over-the-counter and prescription medicines only as told by your health care provider.  Do not use any products that contain nicotine or tobacco, such as cigarettes, e-cigarettes, and chewing tobacco. These can delay healing after injury. If you need help quitting, ask your health care provider.  Keep all follow-up visits as told by your health care provider. This is important.  Contact a health care provider if:  You have a fever.  Your chest pain becomes worse.  You have new symptoms.  Get help right away if:  You have nausea or vomiting.  You feel sweaty or light-headed.  You have a cough with mucus from your lungs (sputum) or you cough up blood.  You develop shortness of breath.  These symptoms may represent a serious problem that is an emergency. Do not wait to see if the symptoms will go away. Get medical help right away. Call your local emergency services (911 in the [place] Do not drive yourself to the hospital.    Summary  Chest wall pain is pain in or around the bones and muscles of your chest.  Depending on the cause, it may be treated with ice, rest, medicines, and avoiding activities that cause pain.  Contact a health care provider if you have a fever, worsening chest pain, or new symptoms.  Get help right away if you feel light-headed or you develop shortness of breath. These symptoms may be an emergency.  This information is not intended to replace advice given to you by your health care provider. Make sure you discuss any questions you have with your health care provider.    Document Revised: 12/11/2023 Document Reviewed: 12/11/2023  Elsevier Patient Education © 2024 Elsevier Inc.

## 2024-04-16 NOTE — DISCHARGE NOTE PROVIDER - HOSPITAL COURSE
82 year old male with PMH of DM, HTN, CAD, Afib presents after being found hypotensive and bradycardic at outpatient PCP. Patient states he was scheduled for stress test outpatient and when he arrived, he said the nurses told him his heart rate was low and needed to come to the ED. Patient currently denies any sxs and denies any lightheadedness chest pain, shortness of breath, palpitations, abdominal pain, or change in bowel habits.     In the ED:  Afebrile, HR 45-55, /89  No electrolyte abnormalities  Trop X1 negative  EKG sinus bradycardia     Pt admitted to telemetry for bradycardia. p/w bradycardia 45-55 ppm. Asymptomatic, /58. No electrolyte abnormalities. Trop X1 negative. 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 Admit to tele. Cardio, Dr. Haines consulted. Pt has a hx of a-fib, pt takes Eliquis and metoprolol at home. Will start FD Lovenox, hold metoprolol. Bili 1.6, normal LFTs. no abdominal pain. continue to trend. hx of DM, does not know home meds. Will start SS. Diabetes diet. A1c in AM. DVT prophylaxis FD Lovenox. Stress test shows there is a small, partially fixed, mild intensity defect in the inferoapical wall that thickens, most consistent with attenuation artifact. * Post-stress resting myocardial perfusion gated SPECT imaging was performed (LVEF > 70%;LVEDV = 115 ml.) * Likely negative study for reversible ischemia.    Patient is medically stable and ready for discharge.  This is only a brief summary. Please refer to the chart for the full hospital course.      .  
show

## 2024-05-30 ENCOUNTER — EMERGENCY (EMERGENCY)
Facility: HOSPITAL | Age: 84
LOS: 1 days | Discharge: HOME | End: 2024-05-30
Attending: EMERGENCY MEDICINE
Payer: MEDICARE

## 2024-05-30 VITALS
OXYGEN SATURATION: 96 % | TEMPERATURE: 97 F | WEIGHT: 160.06 LBS | HEIGHT: 68 IN | DIASTOLIC BLOOD PRESSURE: 83 MMHG | HEART RATE: 69 BPM | SYSTOLIC BLOOD PRESSURE: 126 MMHG | RESPIRATION RATE: 18 BRPM

## 2024-05-30 VITALS
DIASTOLIC BLOOD PRESSURE: 88 MMHG | RESPIRATION RATE: 18 BRPM | OXYGEN SATURATION: 97 % | HEART RATE: 53 BPM | TEMPERATURE: 98 F | SYSTOLIC BLOOD PRESSURE: 173 MMHG

## 2024-05-30 DIAGNOSIS — Z95.5 PRESENCE OF CORONARY ANGIOPLASTY IMPLANT AND GRAFT: Chronic | ICD-10-CM

## 2024-05-30 LAB
ALBUMIN SERPL ELPH-MCNC: 2.8 G/DL — LOW (ref 3.5–5)
ALP SERPL-CCNC: 140 U/L — HIGH (ref 40–120)
ALT FLD-CCNC: 31 U/L DA — SIGNIFICANT CHANGE UP (ref 10–60)
ANION GAP SERPL CALC-SCNC: 9 MMOL/L — SIGNIFICANT CHANGE UP (ref 5–17)
AST SERPL-CCNC: 34 U/L — SIGNIFICANT CHANGE UP (ref 10–40)
BASOPHILS # BLD AUTO: 0.02 K/UL — SIGNIFICANT CHANGE UP (ref 0–0.2)
BASOPHILS NFR BLD AUTO: 1 % — SIGNIFICANT CHANGE UP (ref 0–2)
BILIRUB SERPL-MCNC: 0.8 MG/DL — SIGNIFICANT CHANGE UP (ref 0.2–1.2)
BLD GP AB SCN SERPL QL: SIGNIFICANT CHANGE UP
BUN SERPL-MCNC: 11 MG/DL — SIGNIFICANT CHANGE UP (ref 7–18)
BURR CELLS BLD QL SMEAR: SLIGHT — SIGNIFICANT CHANGE UP
CALCIUM SERPL-MCNC: 8 MG/DL — LOW (ref 8.4–10.5)
CHLORIDE SERPL-SCNC: 106 MMOL/L — SIGNIFICANT CHANGE UP (ref 96–108)
CO2 SERPL-SCNC: 22 MMOL/L — SIGNIFICANT CHANGE UP (ref 22–31)
CREAT SERPL-MCNC: 1.09 MG/DL — SIGNIFICANT CHANGE UP (ref 0.5–1.3)
DACRYOCYTES BLD QL SMEAR: SLIGHT — SIGNIFICANT CHANGE UP
EGFR: 67 ML/MIN/1.73M2 — SIGNIFICANT CHANGE UP
ELLIPTOCYTES BLD QL SMEAR: SIGNIFICANT CHANGE UP
EOSINOPHIL # BLD AUTO: 0.12 K/UL — SIGNIFICANT CHANGE UP (ref 0–0.5)
EOSINOPHIL NFR BLD AUTO: 5 % — SIGNIFICANT CHANGE UP (ref 0–6)
GLUCOSE SERPL-MCNC: 149 MG/DL — HIGH (ref 70–99)
HCT VFR BLD CALC: 24.9 % — LOW (ref 39–50)
HGB BLD-MCNC: 8.1 G/DL — LOW (ref 13–17)
HYPOCHROMIA BLD QL: SIGNIFICANT CHANGE UP
HYPOSEGMENTATION: PRESENT — SIGNIFICANT CHANGE UP
LG PLATELETS BLD QL AUTO: SLIGHT — SIGNIFICANT CHANGE UP
LIDOCAIN IGE QN: 13 U/L — SIGNIFICANT CHANGE UP (ref 13–75)
LYMPHOCYTES # BLD AUTO: 0.76 K/UL — LOW (ref 1–3.3)
LYMPHOCYTES # BLD AUTO: 32 % — SIGNIFICANT CHANGE UP (ref 13–44)
MANUAL SMEAR VERIFICATION: SIGNIFICANT CHANGE UP
MCHC RBC-ENTMCNC: 30.8 PG — SIGNIFICANT CHANGE UP (ref 27–34)
MCHC RBC-ENTMCNC: 32.5 GM/DL — SIGNIFICANT CHANGE UP (ref 32–36)
MCV RBC AUTO: 94.7 FL — SIGNIFICANT CHANGE UP (ref 80–100)
MONOCYTES # BLD AUTO: 0.24 K/UL — SIGNIFICANT CHANGE UP (ref 0–0.9)
MONOCYTES NFR BLD AUTO: 10 % — SIGNIFICANT CHANGE UP (ref 2–14)
MYELOCYTES NFR BLD: 3 % — HIGH (ref 0–0)
NEUTROPHILS # BLD AUTO: 1.05 K/UL — LOW (ref 1.8–7.4)
NEUTROPHILS NFR BLD AUTO: 42 % — LOW (ref 43–77)
NEUTS BAND # BLD: 2 % — SIGNIFICANT CHANGE UP (ref 0–8)
NRBC # BLD: 0 /100 WBCS — SIGNIFICANT CHANGE UP (ref 0–0)
PLAT MORPH BLD: NORMAL — SIGNIFICANT CHANGE UP
PLATELET # BLD AUTO: 119 K/UL — LOW (ref 150–400)
PLATELET COUNT - ESTIMATE: ABNORMAL
POIKILOCYTOSIS BLD QL AUTO: SIGNIFICANT CHANGE UP
POTASSIUM SERPL-MCNC: 4.2 MMOL/L — SIGNIFICANT CHANGE UP (ref 3.5–5.3)
POTASSIUM SERPL-SCNC: 4.2 MMOL/L — SIGNIFICANT CHANGE UP (ref 3.5–5.3)
PROT SERPL-MCNC: 6.1 G/DL — SIGNIFICANT CHANGE UP (ref 6–8.3)
RBC # BLD: 2.63 M/UL — LOW (ref 4.2–5.8)
RBC # FLD: 19 % — HIGH (ref 10.3–14.5)
RBC BLD AUTO: ABNORMAL
SCHISTOCYTES BLD QL AUTO: SLIGHT — SIGNIFICANT CHANGE UP
SODIUM SERPL-SCNC: 137 MMOL/L — SIGNIFICANT CHANGE UP (ref 135–145)
VARIANT LYMPHS # BLD: 5 % — SIGNIFICANT CHANGE UP (ref 0–6)
WBC # BLD: 2.39 K/UL — LOW (ref 3.8–10.5)
WBC # FLD AUTO: 2.39 K/UL — LOW (ref 3.8–10.5)

## 2024-05-30 PROCEDURE — 80053 COMPREHEN METABOLIC PANEL: CPT

## 2024-05-30 PROCEDURE — 36415 COLL VENOUS BLD VENIPUNCTURE: CPT

## 2024-05-30 PROCEDURE — P9040: CPT

## 2024-05-30 PROCEDURE — 99285 EMERGENCY DEPT VISIT HI MDM: CPT

## 2024-05-30 PROCEDURE — 36430 TRANSFUSION BLD/BLD COMPNT: CPT

## 2024-05-30 PROCEDURE — 85025 COMPLETE CBC W/AUTO DIFF WBC: CPT

## 2024-05-30 PROCEDURE — 86901 BLOOD TYPING SEROLOGIC RH(D): CPT

## 2024-05-30 PROCEDURE — 86923 COMPATIBILITY TEST ELECTRIC: CPT

## 2024-05-30 PROCEDURE — 86900 BLOOD TYPING SEROLOGIC ABO: CPT

## 2024-05-30 PROCEDURE — 83690 ASSAY OF LIPASE: CPT

## 2024-05-30 PROCEDURE — 99285 EMERGENCY DEPT VISIT HI MDM: CPT | Mod: 25

## 2024-05-30 PROCEDURE — 86850 RBC ANTIBODY SCREEN: CPT

## 2024-05-30 NOTE — ED ADULT NURSE NOTE - NSFALLASSESSNEED_ED_ALL_ED
Subjective:      Patient ID: Ramon Ward is a 61 y o  female  Here to establish care,son Lan present in the room   Rheumatoid arthritis- diagnosed few years ago, sees Dr Hang Espitia and is on chronic methotrexate therapy, at the time of diagnosis she was on chronic prednisone and now has not required prednisone in over 2-3 years  She gets her routine labs every 3 months from rheumatology and does not have a PCP , Previously saw Dr Sj Mina and Dr Lacey Estrada prior to that  Elevated blood glucose- does not know that she is diabetic, never taken any medications, father had diabetes and heart disease  Elevated blood pressure- she is anxious about the visit today and states had a bad day since morning which got her worried and attributes this to her elevated BP, she is asymptomatic at present  Overweight-she does watch her diet and eats simple meals, she does not exercise formally but enjoys house work and walking      Past Medical History:   Diagnosis Date    [facility]        Family History   Problem Relation Age of Onset    Diabetes Father        History reviewed  No pertinent surgical history  reports that she has never smoked  She has never used smokeless tobacco  She reports previous alcohol use  Current Outpatient Medications:     methotrexate 2 5 MG tablet, 3 tablet once a week, Disp: , Rfl:     metFORMIN (GLUCOPHAGE) 500 mg tablet, Take 2 tablets (1,000 mg total) by mouth 2 (two) times a day with meals, Disp: 120 tablet, Rfl: 0    The following portions of the patient's history were reviewed and updated as appropriate: allergies, current medications, past family history, past medical history, past social history, past surgical history and problem list     Review of Systems   Constitutional: Negative for activity change, appetite change, chills, diaphoresis, fatigue and fever     HENT: Negative for congestion, facial swelling, mouth sores, rhinorrhea, sinus pressure, sneezing, sore throat, tinnitus, trouble swallowing and voice change  Eyes: Negative for pain, discharge, redness and visual disturbance  Respiratory: Negative for cough, shortness of breath and wheezing  Cardiovascular: Negative for chest pain, palpitations and leg swelling  Gastrointestinal: Negative for abdominal pain, blood in stool, constipation, diarrhea and nausea  Endocrine: Negative for cold intolerance and heat intolerance  Genitourinary: Negative for dysuria, hematuria and urgency  Musculoskeletal: Negative for arthralgias, back pain and myalgias  Skin: Negative for rash and wound  Allergic/Immunologic: Negative for environmental allergies and food allergies  Neurological: Negative for dizziness, tremors, seizures, syncope, facial asymmetry, speech difficulty, weakness, light-headedness, numbness and headaches  Hematological: Negative for adenopathy  Psychiatric/Behavioral: Negative for agitation and behavioral problems  The patient is nervous/anxious  Objective:    /90 (BP Location: Right arm, Patient Position: Sitting)   Pulse 100   Temp 98 6 °F (37 °C) (Temporal)   Ht 5' (1 524 m)   Wt 61 7 kg (136 lb)   SpO2 98%   BMI 26 56 kg/m²      Physical Exam  Vitals signs and nursing note reviewed  Constitutional:       Appearance: Normal appearance  She is well-developed  HENT:      Head: Normocephalic and atraumatic  Right Ear: Tympanic membrane, ear canal and external ear normal       Left Ear: Tympanic membrane, ear canal and external ear normal       Ears:      Comments: Decreased hearing grossly     Nose: Nose normal    Eyes:      General: No scleral icterus  Right eye: No discharge  Left eye: No discharge  Conjunctiva/sclera: Conjunctivae normal       Pupils: Pupils are equal, round, and reactive to light  Neck:      Musculoskeletal: Normal range of motion and neck supple  Thyroid: No thyromegaly     Cardiovascular:      Rate and Rhythm: Normal rate and regular rhythm  Pulses: no weak pulses          Dorsalis pedis pulses are 2+ on the right side and 2+ on the left side  Heart sounds: Normal heart sounds  No murmur  No gallop  Pulmonary:      Effort: Pulmonary effort is normal       Breath sounds: Normal breath sounds  No wheezing or rales  Abdominal:      General: There is no distension  Palpations: Abdomen is soft  Tenderness: There is no abdominal tenderness  Musculoskeletal:         General: No tenderness or deformity  Feet:      Right foot:      Skin integrity: No ulcer, skin breakdown, erythema, warmth, callus or dry skin  Left foot:      Skin integrity: No ulcer, skin breakdown, erythema, warmth, callus or dry skin  Lymphadenopathy:      Cervical: No cervical adenopathy  Skin:     Findings: No erythema or rash  Neurological:      General: No focal deficit present  Mental Status: She is alert and oriented to person, place, and time  Mental status is at baseline  Psychiatric:         Mood and Affect: Mood normal          Behavior: Behavior normal        Patient's shoes and socks removed  Right Foot/Ankle   Right Foot Inspection  Skin Exam: skin normal skin not intact, no dry skin, no warmth, no callus, no erythema, no maceration, no abnormal color, no pre-ulcer, no ulcer and no callus                          Toe Exam: ROM and strength within normal limits  Sensory       Monofilament testing: intact  Vascular  Capillary refills: < 3 seconds  The right DP pulse is 2+     Right Toe  - Comprehensive Exam  Ecchymosis: none  Swelling: none   Tenderness: none         Left Foot/Ankle  Left Foot Inspection  Skin Exam: skin normalskin not intact, no dry skin, no warmth, no erythema, no maceration, normal color, no pre-ulcer, no ulcer and no callus                         Toe Exam: ROM and strength within normal limits                   Sensory       Monofilament: intact  Vascular  Capillary refills: < 3 seconds  The left DP pulse is 2+  Left Toe  - Comprehensive Exam  Ecchymosis: none  Swelling: none   Tenderness: none       Assign Risk Category:  No deformity present; No loss of protective sensation; No weak pulses       Risk: 0          Recent Results (from the past 1008 hour(s))   HEMOGLOBIN A1C    Collection Time: 03/27/21  9:27 AM   Result Value Ref Range    Hemoglobin A1C 7 5 (H) <5 7 %    eAG, EST AVG Glucose 169 (H) <154 mg/dL       Assessment/Plan:       Diagnoses and all orders for this visit:    Type 2 diabetes mellitus with hyperglycemia, without long-term current use of insulin (HCC)  -     Lipid panel; Future  -     HEMOGLOBIN A1C W/ EAG ESTIMATION; Future  -     Microalbumin / creatinine urine ratio; Future  -     Basic metabolic panel; Future  -     Glucometer  -     Glucometer test strips  -     Lancets  -     metFORMIN (GLUCOPHAGE) 500 mg tablet; Take 2 tablets (1,000 mg total) by mouth 2 (two) times a day with meals    Encounter for screening mammogram for breast cancer  -     Mammo screening bilateral w 3d & cad; Future    Encounter for screening colonoscopy  -     Ambulatory referral for colonoscopy; Future    Need for hepatitis C screening test  -     Hepatitis C Antibody ([facility]); Future    Encounter for immunization  -     TDAP VACCINE GREATER THAN OR EQUAL TO 8YO IM  -     Cancel: PNEUMOCOCCAL CONJUGATE VACCINE 13-VALENT GREATER THAN 6 MONTHS  -     PNEUMOCOCCAL POLYSACCHARIDE VACCINE 23-VALENT =>3YO SQ IM    Bilateral hearing loss, unspecified hearing loss type  -     Ambulatory referral to Audiology; Future    Overweight with body mass index (BMI) of 26 to 26 9 in adult    Elevated blood pressure reading in office without diagnosis of hypertension    Other orders  -     methotrexate 2 5 MG tablet; 3 tablet once a week        I have advised to start metformin, titrate weekly to 1000 mg BID with meals, discussed most common side effects     I have given her a handout to adequately perform diabetic foot care, goal A1C-6 5 or less, given and discussed hypoglycemia protocol  Also needs A1C check in 3 months  She should be on statin, however no recent lipid panel is available, ordered  Also needs Tdap and Pneumovax today-administered  Discussed importance of annual diabetic eye exam and she will schedule  She does have bilateral decreased hearing, check audiology screen- son states she likely had this for a while  Continue methotrexate, avoid prednisone for flare ups when possibl due to risk of BG becoming uncontrolled  BMI Counseling: Body mass index is 26 56 kg/m²  The BMI is above normal  Nutrition recommendations include decreasing portion sizes, encouraging healthy choices of fruits and vegetables, decreasing fast food intake, consuming healthier snacks, limiting drinks that contain sugar, moderation in carbohydrate intake and reducing intake of cholesterol  Exercise recommendations include moderate physical activity 150 minutes/week  No pharmacotherapy was ordered  F/U in 4 weeks, 1 week for BP check- discussed given elevated DBP , she will likely need medications, she is hesitant to  Start at this time  I will readdress at next visit  She will monitory BP at home and bring me the log  She is due for colonoscopy and mammogram which are ordered today  Risks and benefits of Hep C testing discussed to screen for Hep C infection  Pt agreeable to the test and order for Hep C ab placed  Patient and son were given opportunity to ask questions and all questions were answered  yes

## 2024-05-30 NOTE — ED PROVIDER NOTE - PATIENT PORTAL LINK FT
You can access the FollowMyHealth Patient Portal offered by [facility] by registering at the following website: [email or web address]. By joining [employer]’s FollowMyHealth portal, you will also be able to view your health information using other applications (apps) compatible with our system.

## 2024-05-30 NOTE — ED PROVIDER NOTE - OBJECTIVE STATEMENT
83-year-old male with a history of stage IV pancreatic cancer on chemotherapy last dose last Thursday here for low hemoglobin 7.7 found on blood work done yesterday.  Patient has complaints of generalized weakness.  Was sent for blood transfusion by his oncologist in [place].  Accompanied by son who states patient never had a blood transfusion before.  Patient is not on blood thinners and denies having any blood in stool.  Denies any chest pain or shortness of breath.  Denies any history of heart failure.

## 2024-05-30 NOTE — ED PROVIDER NOTE - NSFOLLOWUPINSTRUCTIONS_ED_ALL_ED_FT
Blood Transfusion    WHAT YOU NEED TO KNOW:    A blood transfusion is used to give you blood through an IV. You may get only part of the blood, such as red blood cells, platelets, or plasma. The blood may be from you and stored for you to use later. The blood may instead be from another person. Donated blood is tested for HIV, hepatitis, syphilis, West Nile virus, and other diseases.    DISCHARGE INSTRUCTIONS:    Call 911 for any of the following:    You have a skin rash, hives, swelling, or itching.    You have trouble breathing, shortness of breath, wheezing, or coughing.    Your throat tightens or your lips or tongue swell.    You have difficulty swallowing or speaking.  Seek care immediately if:    You develop a high fever and chills.    You are dizzy, lightheaded, confused, or feel like you are going to faint.    You have nausea, diarrhea, or abdominal cramps, or you are vomiting.    You urinate little or not at all.    You develop headaches or double vision.    Your skin or the whites of your eyes look yellow.    You see pinpoint purple spots or purple patches on your body.    You have a seizure.  Contact your healthcare provider if:    You feel tired and weak within 10 days of your transfusion.    You have questions or concerns about blood transfusions.  Medicines:    Antihistamines may help stop mild itching or a rash.    Epinephrine is emergency medicine used to stop anaphylaxis. You may be given epinephrine if you are at risk for anaphylaxis. Your healthcare provider will teach you how to use it.    Take your medicine as directed. Contact your healthcare provider if you think your medicine is not helping or if you have side effects. Tell your provider if you are allergic to any medicine. Keep a list of the medicines, vitamins, and herbs you take. Include the amounts, and when and why you take them. Bring the list or the pill bottles to follow-up visits. Carry your medicine list with you in case of an emergency.  Apply ice to decrease pain and swelling. Use an ice pack, or put ice in a plastic bag and wrap a towel around it. Apply the ice pack or wrapped bag to your transfusion site for 20 minutes each hour or as directed.    Follow up with your healthcare provider as directed: Write down your questions so you remember to ask them during your visits.    © [employer] 1973, 2024

## 2024-05-30 NOTE — ED ADULT NURSE NOTE - NSFALLHARMRISKINTERV_ED_ALL_ED
Assistance OOB with selected safe patient handling equipment if applicable/Assistance with ambulation/Communicate risk of Fall with Harm to all staff, patient, and family/Encourage patient to sit up slowly, dangle for a short time, stand at bedside before walking/Monitor gait and stability/Orthostatic vital signs/Provide visual cue: red socks, yellow wristband, yellow gown, etc/Reinforce activity limits and safety measures with patient and family/Bed in lowest position, wheels locked, appropriate side rails in place/Call bell, personal items and telephone in reach/Instruct patient to call for assistance before getting out of bed/chair/stretcher/Non-slip footwear applied when patient is off stretcher/[place] to call system/Physically safe environment - no spills, clutter or unnecessary equipment/Purposeful Proactive Rounding/Room/bathroom lighting operational, light cord in reach

## 2024-05-30 NOTE — ED PROVIDER NOTE - CLINICAL SUMMARY MEDICAL DECISION MAKING FREE TEXT BOX
83-year-old male with stage IV pancreatic cancer on chemo here for anemia.  Sent for blood transfusion.  Will check lab work and assess for blood transfusion.  Patient consented for blood transfusion if necessary.

## 2024-07-15 ENCOUNTER — INPATIENT (INPATIENT)
Facility: HOSPITAL | Age: 84
LOS: 2 days | Discharge: NURSING, REHAB OR LONG-TERM CARE | DRG: 183 | End: 2024-07-18
Attending: INTERNAL MEDICINE | Admitting: INTERNAL MEDICINE
Payer: MEDICARE

## 2024-07-15 VITALS
WEIGHT: 160.06 LBS | DIASTOLIC BLOOD PRESSURE: 73 MMHG | RESPIRATION RATE: 18 BRPM | SYSTOLIC BLOOD PRESSURE: 160 MMHG | HEART RATE: 73 BPM | HEIGHT: 68 IN | OXYGEN SATURATION: 96 % | TEMPERATURE: 98 F

## 2024-07-15 DIAGNOSIS — I25.10 ATHEROSCLEROTIC HEART DISEASE OF NATIVE CORONARY ARTERY WITHOUT ANGINA PECTORIS: ICD-10-CM

## 2024-07-15 DIAGNOSIS — Z95.5 PRESENCE OF CORONARY ANGIOPLASTY IMPLANT AND GRAFT: Chronic | ICD-10-CM

## 2024-07-15 DIAGNOSIS — I48.0 PAROXYSMAL ATRIAL FIBRILLATION: ICD-10-CM

## 2024-07-15 DIAGNOSIS — R45.1 RESTLESSNESS AND AGITATION: ICD-10-CM

## 2024-07-15 DIAGNOSIS — K27.9 PEPTIC ULCER, SITE UNSPECIFIED, UNSPECIFIED AS ACUTE OR CHRONIC, WITHOUT HEMORRHAGE OR PERFORATION: ICD-10-CM

## 2024-07-15 DIAGNOSIS — R62.7 ADULT FAILURE TO THRIVE: ICD-10-CM

## 2024-07-15 DIAGNOSIS — I10 ESSENTIAL (PRIMARY) HYPERTENSION: ICD-10-CM

## 2024-07-15 DIAGNOSIS — N39.0 URINARY TRACT INFECTION, SITE NOT SPECIFIED: ICD-10-CM

## 2024-07-15 DIAGNOSIS — Z29.9 ENCOUNTER FOR PROPHYLACTIC MEASURES, UNSPECIFIED: ICD-10-CM

## 2024-07-15 DIAGNOSIS — C79.9 SECONDARY MALIGNANT NEOPLASM OF UNSPECIFIED SITE: ICD-10-CM

## 2024-07-15 DIAGNOSIS — S22.42XA MULTIPLE FRACTURES OF RIBS, LEFT SIDE, INITIAL ENCOUNTER FOR CLOSED FRACTURE: ICD-10-CM

## 2024-07-15 LAB
ACETONE SERPL-MCNC: NEGATIVE — SIGNIFICANT CHANGE UP
ALBUMIN SERPL ELPH-MCNC: 2.5 G/DL — LOW (ref 3.5–5)
ALP SERPL-CCNC: 126 U/L — HIGH (ref 40–120)
ALT FLD-CCNC: 22 U/L DA — SIGNIFICANT CHANGE UP (ref 10–60)
ANION GAP SERPL CALC-SCNC: 7 MMOL/L — SIGNIFICANT CHANGE UP (ref 5–17)
APPEARANCE UR: CLEAR — SIGNIFICANT CHANGE UP
APTT BLD: 37.1 SEC — HIGH (ref 24.5–35.6)
AST SERPL-CCNC: 30 U/L — SIGNIFICANT CHANGE UP (ref 10–40)
BACTERIA # UR AUTO: ABNORMAL /HPF
BASOPHILS # BLD AUTO: 0.1 K/UL — SIGNIFICANT CHANGE UP (ref 0–0.2)
BASOPHILS NFR BLD AUTO: 1.1 % — SIGNIFICANT CHANGE UP (ref 0–2)
BILIRUB SERPL-MCNC: 0.8 MG/DL — SIGNIFICANT CHANGE UP (ref 0.2–1.2)
BILIRUB UR-MCNC: NEGATIVE — SIGNIFICANT CHANGE UP
BLD GP AB SCN SERPL QL: SIGNIFICANT CHANGE UP
BUN SERPL-MCNC: 17 MG/DL — SIGNIFICANT CHANGE UP (ref 7–18)
BURR CELLS BLD QL SMEAR: PRESENT — SIGNIFICANT CHANGE UP
CALCIUM SERPL-MCNC: 7.8 MG/DL — LOW (ref 8.4–10.5)
CHLORIDE SERPL-SCNC: 110 MMOL/L — HIGH (ref 96–108)
CK SERPL-CCNC: 60 U/L — SIGNIFICANT CHANGE UP (ref 35–232)
CO2 SERPL-SCNC: 23 MMOL/L — SIGNIFICANT CHANGE UP (ref 22–31)
COLOR SPEC: YELLOW — SIGNIFICANT CHANGE UP
COMMENT - URINE: SIGNIFICANT CHANGE UP
CREAT SERPL-MCNC: 0.88 MG/DL — SIGNIFICANT CHANGE UP (ref 0.5–1.3)
DIFF PNL FLD: NEGATIVE — SIGNIFICANT CHANGE UP
EGFR: 85 ML/MIN/1.73M2 — SIGNIFICANT CHANGE UP
ELLIPTOCYTES BLD QL SMEAR: SLIGHT — SIGNIFICANT CHANGE UP
EOSINOPHIL # BLD AUTO: 0.07 K/UL — SIGNIFICANT CHANGE UP (ref 0–0.5)
EOSINOPHIL NFR BLD AUTO: 0.7 % — SIGNIFICANT CHANGE UP (ref 0–6)
GLUCOSE SERPL-MCNC: 119 MG/DL — HIGH (ref 70–99)
GLUCOSE UR QL: NEGATIVE MG/DL — SIGNIFICANT CHANGE UP
HCT VFR BLD CALC: 33 % — LOW (ref 39–50)
HGB BLD-MCNC: 10.7 G/DL — LOW (ref 13–17)
HYALINE CASTS # UR AUTO: PRESENT
IMM GRANULOCYTES NFR BLD AUTO: 0.9 % — SIGNIFICANT CHANGE UP (ref 0–0.9)
INR BLD: 1.16 RATIO — SIGNIFICANT CHANGE UP (ref 0.85–1.18)
KETONES UR-MCNC: ABNORMAL MG/DL
LEUKOCYTE ESTERASE UR-ACNC: ABNORMAL
LIDOCAIN IGE QN: 8 U/L — LOW (ref 13–75)
LYMPHOCYTES # BLD AUTO: 1.72 K/UL — SIGNIFICANT CHANGE UP (ref 1–3.3)
LYMPHOCYTES # BLD AUTO: 18.3 % — SIGNIFICANT CHANGE UP (ref 13–44)
MAGNESIUM SERPL-MCNC: 1.6 MG/DL — SIGNIFICANT CHANGE UP (ref 1.6–2.6)
MANUAL SMEAR VERIFICATION: SIGNIFICANT CHANGE UP
MCHC RBC-ENTMCNC: 30.2 PG — SIGNIFICANT CHANGE UP (ref 27–34)
MCHC RBC-ENTMCNC: 32.4 GM/DL — SIGNIFICANT CHANGE UP (ref 32–36)
MCV RBC AUTO: 93.2 FL — SIGNIFICANT CHANGE UP (ref 80–100)
MONOCYTES # BLD AUTO: 1.07 K/UL — HIGH (ref 0–0.9)
MONOCYTES NFR BLD AUTO: 11.4 % — SIGNIFICANT CHANGE UP (ref 2–14)
NEUTROPHILS # BLD AUTO: 6.34 K/UL — SIGNIFICANT CHANGE UP (ref 1.8–7.4)
NEUTROPHILS NFR BLD AUTO: 67.6 % — SIGNIFICANT CHANGE UP (ref 43–77)
NITRITE UR-MCNC: NEGATIVE — SIGNIFICANT CHANGE UP
NRBC # BLD: 0 /100 WBCS — SIGNIFICANT CHANGE UP (ref 0–0)
NT-PROBNP SERPL-SCNC: 2200 PG/ML — HIGH (ref 0–450)
PH UR: 5.5 — SIGNIFICANT CHANGE UP (ref 5–8)
PLAT MORPH BLD: NORMAL — SIGNIFICANT CHANGE UP
PLATELET # BLD AUTO: 293 K/UL — SIGNIFICANT CHANGE UP (ref 150–400)
PLATELET COUNT - ESTIMATE: NORMAL — SIGNIFICANT CHANGE UP
POIKILOCYTOSIS BLD QL AUTO: SLIGHT — SIGNIFICANT CHANGE UP
POTASSIUM SERPL-MCNC: 3.6 MMOL/L — SIGNIFICANT CHANGE UP (ref 3.5–5.3)
POTASSIUM SERPL-SCNC: 3.6 MMOL/L — SIGNIFICANT CHANGE UP (ref 3.5–5.3)
PROT SERPL-MCNC: 5.5 G/DL — LOW (ref 6–8.3)
PROT UR-MCNC: 30 MG/DL
PROTHROM AB SERPL-ACNC: 13.2 SEC — HIGH (ref 9.5–13)
RBC # BLD: 3.54 M/UL — LOW (ref 4.2–5.8)
RBC # FLD: 20.8 % — HIGH (ref 10.3–14.5)
RBC BLD AUTO: ABNORMAL
RBC CASTS # UR COMP ASSIST: 2 /HPF — SIGNIFICANT CHANGE UP (ref 0–4)
SCHISTOCYTES BLD QL AUTO: SLIGHT — SIGNIFICANT CHANGE UP
SMUDGE CELLS # BLD: PRESENT — SIGNIFICANT CHANGE UP
SODIUM SERPL-SCNC: 140 MMOL/L — SIGNIFICANT CHANGE UP (ref 135–145)
SP GR SPEC: 1.02 — SIGNIFICANT CHANGE UP (ref 1–1.03)
TROPONIN I, HIGH SENSITIVITY RESULT: 16.7 NG/L — SIGNIFICANT CHANGE UP
UROBILINOGEN FLD QL: 1 MG/DL — SIGNIFICANT CHANGE UP (ref 0.2–1)
WBC # BLD: 9.38 K/UL — SIGNIFICANT CHANGE UP (ref 3.8–10.5)
WBC # FLD AUTO: 9.38 K/UL — SIGNIFICANT CHANGE UP (ref 3.8–10.5)
WBC UR QL: 8 /HPF — HIGH (ref 0–5)

## 2024-07-15 PROCEDURE — 70450 CT HEAD/BRAIN W/O DYE: CPT | Mod: 26,MC

## 2024-07-15 PROCEDURE — 99285 EMERGENCY DEPT VISIT HI MDM: CPT

## 2024-07-15 PROCEDURE — 71250 CT THORAX DX C-: CPT | Mod: 26,MC

## 2024-07-15 PROCEDURE — 93010 ELECTROCARDIOGRAM REPORT: CPT

## 2024-07-15 RX ORDER — CLOPIDOGREL BISULFATE 75 MG/1
75 TABLET, FILM COATED ORAL DAILY
Refills: 0 | Status: DISCONTINUED | OUTPATIENT
Start: 2024-07-15 | End: 2024-07-18

## 2024-07-15 RX ORDER — APIXABAN 5 MG/1
2.5 TABLET, FILM COATED ORAL
Refills: 0 | Status: DISCONTINUED | OUTPATIENT
Start: 2024-07-15 | End: 2024-07-18

## 2024-07-15 RX ORDER — ASPIRIN 325 MG/1
162 TABLET, FILM COATED ORAL ONCE
Refills: 0 | Status: COMPLETED | OUTPATIENT
Start: 2024-07-15 | End: 2024-07-15

## 2024-07-15 RX ORDER — CEFTRIAXONE SODIUM 500 MG
1000 VIAL (EA) INJECTION ONCE
Refills: 0 | Status: COMPLETED | OUTPATIENT
Start: 2024-07-15 | End: 2024-07-15

## 2024-07-15 RX ORDER — PANTOPRAZOLE SODIUM 40 MG/10ML
40 INJECTION, POWDER, FOR SOLUTION INTRAVENOUS
Refills: 0 | Status: DISCONTINUED | OUTPATIENT
Start: 2024-07-15 | End: 2024-07-18

## 2024-07-15 RX ORDER — LOSARTAN POTASSIUM 100 MG/1
25 TABLET, FILM COATED ORAL DAILY
Refills: 0 | Status: DISCONTINUED | OUTPATIENT
Start: 2024-07-15 | End: 2024-07-18

## 2024-07-15 RX ORDER — SODIUM CHLORIDE 0.9 % (FLUSH) 0.9 %
1000 SYRINGE (ML) INJECTION
Refills: 0 | Status: DISCONTINUED | OUTPATIENT
Start: 2024-07-15 | End: 2024-07-17

## 2024-07-15 RX ORDER — METHYLPHENIDATE 30MG/9HR
1 PATCH, TRANSDERMAL 24 HOURS TRANSDERMAL
Refills: 0 | DISCHARGE

## 2024-07-15 RX ORDER — CEFTRIAXONE SODIUM 500 MG
1000 VIAL (EA) INJECTION EVERY 24 HOURS
Refills: 0 | Status: DISCONTINUED | OUTPATIENT
Start: 2024-07-15 | End: 2024-07-18

## 2024-07-15 RX ORDER — HALOPERIDOL DECANOATE 100 MG/ML
1 VIAL (ML) INTRAMUSCULAR ONCE
Refills: 0 | Status: COMPLETED | OUTPATIENT
Start: 2024-07-15 | End: 2024-07-15

## 2024-07-15 RX ORDER — AMLODIPINE BESYLATE 2.5 MG/1
5 TABLET ORAL DAILY
Refills: 0 | Status: DISCONTINUED | OUTPATIENT
Start: 2024-07-15 | End: 2024-07-17

## 2024-07-15 RX ORDER — LIPASE/PROTEASE/AMYLASE 4.5-25-20K
3 CAPSULE,DELAYED RELEASE (ENTERIC COATED) ORAL
Refills: 0 | Status: DISCONTINUED | OUTPATIENT
Start: 2024-07-15 | End: 2024-07-18

## 2024-07-15 RX ORDER — METHYLPHENIDATE 30MG/9HR
10 PATCH, TRANSDERMAL 24 HOURS TRANSDERMAL
Refills: 0 | Status: DISCONTINUED | OUTPATIENT
Start: 2024-07-15 | End: 2024-07-18

## 2024-07-15 RX ORDER — METOPROLOL TARTRATE 50 MG
25 TABLET ORAL DAILY
Refills: 0 | Status: DISCONTINUED | OUTPATIENT
Start: 2024-07-15 | End: 2024-07-18

## 2024-07-15 RX ORDER — SODIUM CHLORIDE 0.9 % (FLUSH) 0.9 %
1000 SYRINGE (ML) INJECTION ONCE
Refills: 0 | Status: COMPLETED | OUTPATIENT
Start: 2024-07-15 | End: 2024-07-15

## 2024-07-15 RX ORDER — RANOLAZINE 500 MG/1
500 TABLET, EXTENDED RELEASE ORAL
Refills: 0 | DISCHARGE

## 2024-07-15 RX ORDER — ESOMEPRAZOLE SODIUM 40 MG/5ML
1 INJECTION INTRAVENOUS
Refills: 0 | DISCHARGE

## 2024-07-15 RX ORDER — RANOLAZINE 500 MG/1
500 TABLET, EXTENDED RELEASE ORAL
Refills: 0 | Status: DISCONTINUED | OUTPATIENT
Start: 2024-07-15 | End: 2024-07-18

## 2024-07-15 RX ADMIN — Medication 100 MILLIGRAM(S): at 19:33

## 2024-07-15 RX ADMIN — Medication 1000 MILLILITER(S): at 15:11

## 2024-07-15 RX ADMIN — Medication 1 MILLIGRAM(S): at 19:33

## 2024-07-15 RX ADMIN — ASPIRIN 162 MILLIGRAM(S): 325 TABLET, FILM COATED ORAL at 14:30

## 2024-07-15 NOTE — H&P ADULT - PROBLEM SELECTOR PLAN 5
Hx of PUD on esomeprazole 40mg qd   c/w home meds Hx of HTN on amlodipine 5mg qd Hx of HTN on amlodipine 5mg qd, Azilsartan / Chlorthalidone 40/12.5mg qd   c/w amlodipine 5mg, azilsartan 40mg   Holding chlorthalidone 2/2 frequents falls

## 2024-07-15 NOTE — ED ADULT TRIAGE NOTE - CCCP TRG CHIEF CMPLNT
Preoperative Note     Patient: Joshua Hoyos Date of Service: 2021   : 1949 MRN: [id number]     Joshua Hoyos is being seen today for a preoperative visit.     Procedure: Cystoscopy, transurethral resection of bladder mass with gemcitabine instillation   Date of Procedure: 21  Indication: Hematuria  Surgeon: Dr. Flores, [facility]: outpatient surgical     HPI   Had hematuria intermittently within last year, now more regular. CT scan showing soft tissue bladder mass, seeing Dr. Flores and had in office cysto, now planning for TURB.     [place] [facility] [place] prior back surgeries for spinal stenosis and legs are chronically weak, can walk 1 block and they don't move smoothly without cramping or pain. Can climb >1 flight of stairs continuously. Limb weakness does not allow for patient to exert himself to get out of breath. No angina with movement or rest.    Had a NM stress test in  before last back surgery, normal.   Since childhood has had a cardiac murmur. Last echo  with multiple regurgitant valves.     Last ECG 2018- NSR with normal conduction    Prior anesthesia: yes  Prior reactions to anesthesia: no      Past Medical History:     Cardiovascular:     Angina: no  Arrhythmias: no   CAD: yes   Prior MI: no   Recent PCI: no   CHF: no  Endocrinology:     Diabetes: no  Uses Insulin: no   Thyroid disease: no  Obesity: no Pulmonology:     Asthma: no  COPD: no  JIMMY: no   Gastrointestinal:     Chronic Liver disease: no  Acute Hepatitis: no  Musculoskeletal:     Neck Arthritis: yes Multilevel discectomy and fusion  TMJ: no  Wears Dentures: no Renal:     Renal Disease: yes  Low serum albumin: no   Hematologic:     History of DVT or PE: no  Uses Anticoagulants: aspirin Neurologic:     Seizure disorder: no  CVA history: no      Functional Capacity: Joshua Hoyos is able to climb 1 flights of stairs.  - METs: >4    Family hx of anesthesia complications:  no  Secure and supportive home for post operative care: yes     JIMMY screening:  S: (snoring) no  T: (tired/fatigued during daytime) yes naps at 2pm for an hour, 8hrs sleep at night  O: (observed stop breathing/gasp @ night) no  P: (pressure=HTN) yes  B: (BMI>35) no  A: (age>50) yes  N: (neck size M >17 inch collar or 43cm, F >16 inch or 41cm) no  G: (Male) yes    ROS:   Review of Systems    All other ROS reviewed negative except as mentioned in HPI     Allergies:   ALLERGIES:   Allergen Reactions   • Niacin DIZZINESS and WEAKNESS     flushing            Medications:  Current Outpatient Medications   Medication Sig Dispense Refill   • Aspirin Buf,CaCarb-MgCarb-MgO, 81 MG Tab Take 81 mg by mouth daily.     • benazepril-hydrochlorthiazide (LOTENSIN HCT) 20-25 MG per tablet TAKE 1 TABLET BY MOUTH EVERY DAY 90 tablet 0   • atorvastatin (LIPITOR) 40 MG tablet TAKE 1 TABLET BY MOUTH AT BEDTIME 90 tablet 0   • DULoxetine (CYMBALTA) 60 MG capsule TAKE 1 CAPSULE BY MOUTH EVERY DAY 90 capsule 3   • Calcium Carbonate-Vitamin D (CALTRATE 600+D PO)      • aspirin 81 MG tablet        No current facility-administered medications for this visit.       Past Medical History:   Active Ambulatory Problems     Diagnosis Date Noted   • Aortic valve sclerosis 02/02/2016   • Depression 04/22/2008   • Essential hypertension 02/10/2015   • Status post lumbar laminectomy 06/16/2016   • Lumbosacral radiculopathy 03/28/2018   • Hyperlipidemia 09/24/2007   • Lumbar foraminal stenosis 03/18/2016   • Coronary arteriosclerosis in native artery 04/21/2009   • Diverticulosis 04/21/2009   • Allergic rhinitis with postnasal drip 04/22/2008   • History of bilateral cataract extraction 06/02/2019   • Osteoarthrosis 10/22/2019   • Encounter for Medicare annual wellness exam 02/19/2020   • Left upper arm pain 08/21/2020   • Cervical radiculopathy 04/28/2021   • Hematuria 05/18/2021     Resolved Ambulatory Problems     Diagnosis Date Noted   • Sebaceous cyst  2018   • Acquired hammer toe of right foot 10/22/2019     Past Medical History:   Diagnosis Date   • Trochanteric bursitis        Past Surgical History:  Past Surgical History:   Procedure Laterality Date   • Bunionectomy Right 2018    with osteotomy   • Cataract extraction, bilateral     • Cervical spine surgery     • Coronary angioplasty with stent placement     • Hernia repair     • Lumbar laminectomy  2016    Bovis L2-L5   • Toe surgery Right 2018    hammertoe correction   • Tonsillectomy         Social History:   Social History     Tobacco Use   • Smoking status: Former Smoker     Packs/day: 0.00     Quit date:      Years since quittin.5   • Smokeless tobacco: Never Used   Vaping Use   • Vaping Use: never used   Substance Use Topics   • Alcohol use: No   • Drug use: No        Review  Patient's medications, allergies, past medical, surgical, social and family histories were reviewed and updated as appropriate.    Physical Exam:   Visit Vitals  /70 (BP Location: LUE - Left upper extremity, Patient Position: Sitting)   Pulse 78   Temp 97.6 °F (36.4 °C) (Temporal)   Ht 5' 11\" (1.803 m)   Wt 87.5 kg (193 lb)   SpO2 96%   BMI 26.92 kg/m²        Physical Exam     CONSTITUTIONAL: alert, in no acute distress and current vital signs reviewed.   HEAD and FACE: atraumatic, no deformities, normocephalic, normal facies.   EYES: no discharge, normal conjunctiva, no eyelid swelling, no ptosis and the sclerae were normal. pupils equal, round and reactive to light and accommodation, conjugate gaze and extraocular movements were intact.   ENT: normal appearing outer ear, normal appearing nose. examination of the tympanic membrane showed normal landmarks, normal appearing external canal. nasal mucosa moist and pink, no nasal discharge. normal lips. oral mucosa pink and moist, no oral lesions. Multiple absent teeth, multiple fillings.  NECK: normal appearing neck, no mass was seen. Decreased    PULMONARY: no respiratory distress, normal respiratory rate and effort and no accessory muscle use. breath sounds clear to auscultation bilaterally.   CV: normal rate, regular rhythm, normal S1 and normal S2. edema was not present in the lower extremities. Murmur: soft systolic murmur at right upper sternal border, soft diastolic murmur left lower mid-clavicular.  ABDOMEN: soft, nontender, nondistended, normal bowel sounds and no abdominal mass. no hepatomegaly, no umbilical hernia was discovered.   MSK: normal gait. no musculoskeletal erythema was seen, no joint swelling seen and no joint tenderness was elicited. normal range of motion. there was no joint instability noted. muscle strength and tone were normal.   NEURO: cranial nerves grossly intact. normal DTRs. no sensory deficits noted, no coordination deficits. Stooped, halting gait muscle strength b/l LE diminished. UE strength normal.  PSYCH: oriented to person, oriented to place and oriented to time. alert and awake, interactive and mood/affect were appropriate. judgement not impaired. normal attention span. short term memory intact.   SKIN, HAIR, NAILS: normal skin color and pigmentation and no rash. no foot ulcers and no skin ulcer was seen. normal skin turgor. no clubbing or cyanosis of the fingernails.    Procedures:  No results found for this or any previous visit.    Assessment/ Plan:     No diagnosis found.    Pre Operative Clearance Plan:  LAI/AHA Classification of surgical procedure risk: Low Risk  LAI/AHA Classification of physical status of patient: ASA II (patient with mild systemic disease)  Revised Cardiac Risk Index: Class I (0.4% risk of MACE)  JIMMY Screening:Intermediate risk (YES to 3-4 questions)  Labs: see attached. CBC, CMP unremarkable. Slightly reduced GFR. ECG with twave inversion in V1 not previously seen. Given 5yrs interval from last stress test, immobility, and history of CAD with multiple regurgitant valves, will request  dobutamine echo to further determine cardiac risk.   Medically optimized for surgery: Pending stress test    Of note, Mr. Hoyos has had multiple discectomies and possible fusions of the cervical and lumber spine that may affect surgical positioning and airway management.     Will fax note to presurgical center and requesting provider. Will addend note once stress test results are available.    Prashanth Fuentes DO     chest pain

## 2024-07-15 NOTE — H&P ADULT - HISTORY OF PRESENT ILLNESS
Patient is a 83M, from home with HHA (12hr/day), ambulates with walker at baseline, with PMHx metastatic pancreatic cancer (CTx on hold for 1 month due to weakness), HTN, CAD, HLD, PUD, DM, dementia (AAOx1 at baseline) who presents with chest pain and fall. Patient sundowns and fell while walking on Thursday on his buttocks. Son denies any head trauma or LOC. Patient was reporting right sided chest/back pain today. Patient is brought in by son due to patient's concerns for pain and increased HHA hours. Patient denies headache, chest pain, SOB, dizziness, palpitation, arm pain, leg numbness or tingling, n/v/d, increased urinary frequency or discomfort on urination.  Patient is a 83M, from home with HHA (12hr/day), ambulates with walker at baseline, with PMHx metastatic pancreatic cancer (CTx on hold for 1 month due to weakness), HTN, CAD, HLD, PUD, DM, afib on eliquis, dementia (AAOx1 at baseline) who presents with chest pain and fall. Patient sundowns and fell while walking on Thursday on his buttocks. Son denies any head trauma or LOC. Patient was reporting right sided chest/back pain today. Patient is brought in by son due to patient's concerns for pain and increased HHA hours. Patient denies headache, chest pain, SOB, dizziness, palpitation, arm pain, leg numbness or tingling, n/v/d, increased urinary frequency or discomfort on urination.

## 2024-07-15 NOTE — H&P ADULT - PROBLEM SELECTOR PLAN 4
Hx of HTN on amlodipine 5mg qd Patient with dementia, AAOx1 at baseline   Tried olanzapine which exacerbated agitation per son   s/p Haldol 2mg (QTc 470mmsec)

## 2024-07-15 NOTE — H&P ADULT - PROBLEM SELECTOR PLAN 2
Hx of metastatic pancreatic cancer (lung mets)   Follows oncologist Ebony Garcia  Last CTx 1 month ago, which was held due to increased weakness  c/w creon 36,000U 3 cap TID pre-meal FTT due to metastatic cancer s/p fall on Thursday (negative for acute fractures)  Nutrition consult   On ritalin 10mg 8AM, 7PM per his oncologist   Case management consulted for increase HHA hours

## 2024-07-15 NOTE — H&P ADULT - PROBLEM SELECTOR PLAN 1
FTT due to metastatic cancer s/p fall on Thursday (negative for acute fractures)  Nutrition consult   Case management consulted for increase HHA hours UA+  C/w ceftriaxone   f/u UCx

## 2024-07-15 NOTE — H&P ADULT - ASSESSMENT
Patient is a 83M, from home with HHA (12hr/day), ambulates with walker at baseline, with PMHx metastatic pancreatic cancer (CTx on hold for 1 month due to weakness), HTN, CAD, HLD, PUD, DM, dementia (AAOx1 at baseline) who presents with chest pain and fall.  Patient is a 83M, from home with HHA (12hr/day), ambulates with walker at baseline, with PMHx metastatic pancreatic cancer (CTx on hold for 1 month due to weakness), HTN, CAD, HLD, PUD, DM, Afib on eliquis, dementia (AAOx1 at baseline) who presents with chest pain and fall. CTH negative for acute pathologies, CT chest showing subacute left anterior third through seventh rib fractures. Admitted for FTT.    Patient is a 83M, from home with HHA (12hr/day), ambulates with walker at baseline, with PMHx metastatic pancreatic cancer (CTx on hold for 1 month due to weakness), HTN, CAD, HLD, PUD, DM, Afib on eliquis, dementia (AAOx1 at baseline) who presents with chest pain and fall. CTH negative for acute pathologies, CT chest showing subacute left anterior third through seventh rib fractures. UA positive. Admitted for UTI and FTT.

## 2024-07-15 NOTE — ED ADULT NURSE NOTE - OBJECTIVE STATEMENT
Pt c/o right sided chest pain w/ movement. Pt had recent fall Thursday 7/11/24, denies hitting head/trauma.

## 2024-07-15 NOTE — H&P ADULT - PROBLEM SELECTOR PLAN 3
Patient with dementia, AAOx1 at baseline   On ritalin 10mg 8AM, 7PM  Tried olanzapine which exacerbated agitation per son   s/p Haldol 2mg (QTc 470mmsec) Hx of metastatic pancreatic cancer (lung mets)   Follows oncologist Ebony Garcia  Last CTx 1 month ago, which was held due to increased weakness  c/w creon 36,000U 3 cap TID pre-meal

## 2024-07-15 NOTE — ED ADULT TRIAGE NOTE - CHIEF COMPLAINT QUOTE
BIBA, s/p fall d/t loss of balance 4 days ago with unwitnessed, since then chest pain from right side to left side x 4days, denies shortness of breath, d/x pancreatitis CA and Lung CA, last chemo x one month ago,

## 2024-07-15 NOTE — ED PROVIDER NOTE - CLINICAL SUMMARY MEDICAL DECISION MAKING FREE TEXT BOX
83-year-old male with history of metastatic pancreatic CA, was on chemo 1 month ago, fell on Thursday night and complaining of chest pain.  Patient appears to be pale upon exam, concern for anemia.  Patient also with weakness, decreased appetite, concern for dehydration, metabolic imbalance.  Patient's chest pain is unlikely cardiac, however given history of CAD and diabetes, will get EKG and troponin.  Will get CT chest to rule out rib fracture or lung pathology since patient's on Eliquis.  Unlikely patient with PE, he is currently on Eliquis

## 2024-07-15 NOTE — ED PROVIDER NOTE - OBJECTIVE STATEMENT
83-year-old male with history of HTN, CAD, HLD, PUD, DM, last dose this morning brought in by ambulance as per son Alan, patient was diagnosed with metastatic pancreatic CA 14 months ago with lung mets.  He is currently on chemo, last chemo 1 month ago.  Patient is supposed to received chemo every 2 weeks.  On Thursday night, patient lost balance and fell on his buttock.  With difficulty ambulation.  He normally walks with assistance.  Son claims patient with weakness for past 2 to 3 months, progressively worsens, decreased appetite, nauseous with no vomiting.  Last BM this morning, no coughing, fever.  Patient also complaining of on and off right-sided chest pain radiated to his left chest for past 3 to 4 days since his fall, worse with movement.  He denies coughing, SOB.  Patient took Tylenol with some relief.  He had blood transfusion in May

## 2024-07-15 NOTE — ED PROVIDER NOTE - PROGRESS NOTE DETAILS
Labs/CT result explained to son and patient   patient with weakness, rib fracture, UTI, will admit patient   Case discussed with    son wants to extend HHA hours

## 2024-07-15 NOTE — ED ADULT NURSE NOTE - NSFALLHARMRISKINTERV_ED_ALL_ED

## 2024-07-15 NOTE — ED PROVIDER NOTE - CARE PLAN
Principal Discharge DX:	Fracture of four ribs of left side  Secondary Diagnosis:	Cystitis  Secondary Diagnosis:	Weakness   1

## 2024-07-15 NOTE — PATIENT PROFILE ADULT - ..
DISPLAY PLAN FREE TEXT DISPLAY PLAN FREE TEXT DISPLAY PLAN FREE TEXT DISPLAY PLAN FREE TEXT DISPLAY PLAN FREE TEXT 15-Jul-2024 23:41:01

## 2024-07-15 NOTE — ED ADULT NURSE NOTE - NS ED NURSE RECORD ANOTHER HT AND WT
Dr. Ordoñez, please review. Alternate recs? -ejb    ==  Phone call to patient. He was recently started on atorva 40 mg. He reports intolerance of this medication in the past with diarrhea. Patient is willing to try another statin to see if better tolerated.       [facility] Call Center    Phone Message    May a detailed message be left on voicemail: yes     Reason for Call: Medication Question or concern regarding medication   Prescription Clarification  Name of Medication: atorvastatin (LIPITOR) 40 MG tablet  Prescribing Provider: Tiago   Pharmacy: [id number] [[address]   What on the order needs clarification? Patient called stating he does not want to take this medication. He states he has been advised to take it. It causes negative side effects such as, diarrhea. Please call patient back to further discuss, thank you.     Action Taken: Message routed to:  Other: Cardiology    Travel Screening: Not Applicable     Thank you!  [facility]                                                                       Yes

## 2024-07-15 NOTE — H&P ADULT - CONVERSATION DETAILS
Discussed what the family's goals are in the treatment of the patient. Son expressed that they would like to do what is medically necessary to make him comfortable and live. Patient and the patient's wife would want him to go peacefully if the time comes and would not like CPR or intubation but is agreeable to medically management. NICKOLAS filled out.

## 2024-07-15 NOTE — PATIENT PROFILE ADULT - FALL HARM RISK - HARM RISK INTERVENTIONS

## 2024-07-15 NOTE — H&P ADULT - NSHPPHYSICALEXAM_GEN_ALL_CORE
GENERAL: NAD   HEAD:  Atraumatic, Normocephalic  EYES: EOMI, PERRLA, conjunctiva and sclera clear  ENMT: No tonsillar erythema, exudates, or enlargement; Dry mucous membranes, No lesions  NECK: Supple, normal appearance, No JVD; Normal thyroid; Trachea midline  NERVOUS SYSTEM:  Alert & Oriented X1 (to self only),  Motor Strength 5/5 B/L upper and lower extremities, sensation intact, CN II-XII intact.   CHEST/LUNG: RLL crackles, No rhonchi, wheezing   HEART: Regular rate and rhythm; No murmurs, rubs, or gallops  ABDOMEN: Soft, Nontender, Nondistended; Bowel sounds present  : No suprapubic tenderness, CVAT;   EXTREMITIES:  2+ Peripheral Pulses, 1+ pedal edema; No clubbing, cyanosis   LYMPH: No lymphadenopathy noted  SKIN: No rashes or lesions;  Good capillary refill

## 2024-07-16 ENCOUNTER — TRANSCRIPTION ENCOUNTER (OUTPATIENT)
Age: 84
End: 2024-07-16

## 2024-07-16 LAB
ANION GAP SERPL CALC-SCNC: 8 MMOL/L — SIGNIFICANT CHANGE UP (ref 5–17)
BUN SERPL-MCNC: 12 MG/DL — SIGNIFICANT CHANGE UP (ref 7–18)
CALCIUM SERPL-MCNC: 7.1 MG/DL — LOW (ref 8.4–10.5)
CHLORIDE SERPL-SCNC: 112 MMOL/L — HIGH (ref 96–108)
CO2 SERPL-SCNC: 22 MMOL/L — SIGNIFICANT CHANGE UP (ref 22–31)
CREAT SERPL-MCNC: 0.57 MG/DL — SIGNIFICANT CHANGE UP (ref 0.5–1.3)
EGFR: 97 ML/MIN/1.73M2 — SIGNIFICANT CHANGE UP
GLUCOSE SERPL-MCNC: 82 MG/DL — SIGNIFICANT CHANGE UP (ref 70–99)
HCT VFR BLD CALC: 27.4 % — LOW (ref 39–50)
HGB BLD-MCNC: 9.2 G/DL — LOW (ref 13–17)
MAGNESIUM SERPL-MCNC: 1.6 MG/DL — SIGNIFICANT CHANGE UP (ref 1.6–2.6)
MCHC RBC-ENTMCNC: 30.3 PG — SIGNIFICANT CHANGE UP (ref 27–34)
MCHC RBC-ENTMCNC: 33.6 GM/DL — SIGNIFICANT CHANGE UP (ref 32–36)
MCV RBC AUTO: 90.1 FL — SIGNIFICANT CHANGE UP (ref 80–100)
NRBC # BLD: 0 /100 WBCS — SIGNIFICANT CHANGE UP (ref 0–0)
PHOSPHATE SERPL-MCNC: 2.2 MG/DL — LOW (ref 2.5–4.5)
PLATELET # BLD AUTO: 265 K/UL — SIGNIFICANT CHANGE UP (ref 150–400)
POTASSIUM SERPL-MCNC: 3.1 MMOL/L — LOW (ref 3.5–5.3)
POTASSIUM SERPL-SCNC: 3.1 MMOL/L — LOW (ref 3.5–5.3)
RBC # BLD: 3.04 M/UL — LOW (ref 4.2–5.8)
RBC # FLD: 20.3 % — HIGH (ref 10.3–14.5)
SARS-COV-2 RNA SPEC QL NAA+PROBE: SIGNIFICANT CHANGE UP
SODIUM SERPL-SCNC: 142 MMOL/L — SIGNIFICANT CHANGE UP (ref 135–145)
WBC # BLD: 7.16 K/UL — SIGNIFICANT CHANGE UP (ref 3.8–10.5)
WBC # FLD AUTO: 7.16 K/UL — SIGNIFICANT CHANGE UP (ref 3.8–10.5)

## 2024-07-16 RX ORDER — HALOPERIDOL DECANOATE 100 MG/ML
1 VIAL (ML) INTRAMUSCULAR EVERY 6 HOURS
Refills: 0 | Status: DISCONTINUED | OUTPATIENT
Start: 2024-07-16 | End: 2024-07-18

## 2024-07-16 RX ORDER — OLANZAPINE 2.5 MG/1
2.5 TABLET, FILM COATED ORAL EVERY 8 HOURS
Refills: 0 | Status: DISCONTINUED | OUTPATIENT
Start: 2024-07-16 | End: 2024-07-16

## 2024-07-16 RX ORDER — SOD PHOS DI, MONO/K PHOS MONO 250 MG
1 TABLET ORAL ONCE
Refills: 0 | Status: COMPLETED | OUTPATIENT
Start: 2024-07-16 | End: 2024-07-16

## 2024-07-16 RX ADMIN — RANOLAZINE 500 MILLIGRAM(S): 500 TABLET, EXTENDED RELEASE ORAL at 06:32

## 2024-07-16 RX ADMIN — APIXABAN 2.5 MILLIGRAM(S): 5 TABLET, FILM COATED ORAL at 06:30

## 2024-07-16 RX ADMIN — AMLODIPINE BESYLATE 5 MILLIGRAM(S): 2.5 TABLET ORAL at 06:30

## 2024-07-16 RX ADMIN — Medication 1 MILLIGRAM(S): at 21:26

## 2024-07-16 RX ADMIN — LOSARTAN POTASSIUM 25 MILLIGRAM(S): 100 TABLET, FILM COATED ORAL at 06:30

## 2024-07-16 RX ADMIN — Medication 3 CAPSULE(S): at 18:35

## 2024-07-16 RX ADMIN — Medication 3 CAPSULE(S): at 12:20

## 2024-07-16 RX ADMIN — CLOPIDOGREL BISULFATE 75 MILLIGRAM(S): 75 TABLET, FILM COATED ORAL at 12:20

## 2024-07-16 RX ADMIN — APIXABAN 2.5 MILLIGRAM(S): 5 TABLET, FILM COATED ORAL at 18:35

## 2024-07-16 RX ADMIN — Medication 125 MILLILITER(S): at 06:30

## 2024-07-16 RX ADMIN — RANOLAZINE 500 MILLIGRAM(S): 500 TABLET, EXTENDED RELEASE ORAL at 18:35

## 2024-07-16 RX ADMIN — Medication 1 PACKET(S): at 18:32

## 2024-07-16 RX ADMIN — Medication 10 MILLIGRAM(S): at 08:47

## 2024-07-16 RX ADMIN — Medication 3 CAPSULE(S): at 08:44

## 2024-07-16 RX ADMIN — Medication 100 MILLIGRAM(S): at 18:48

## 2024-07-16 RX ADMIN — PANTOPRAZOLE SODIUM 40 MILLIGRAM(S): 40 INJECTION, POWDER, FOR SOLUTION INTRAVENOUS at 06:31

## 2024-07-16 RX ADMIN — Medication 125 MILLILITER(S): at 18:44

## 2024-07-16 RX ADMIN — Medication 25 MILLIGRAM(S): at 06:31

## 2024-07-16 RX ADMIN — Medication 10 MILLIGRAM(S): at 18:35

## 2024-07-16 NOTE — DISCHARGE NOTE PROVIDER - NSDCMRMEDTOKEN_GEN_ALL_CORE_FT
acarbose 50 mg oral tablet: 1 tab(s) orally once a day  amLODIPine 5 mg oral tablet: 1 tab(s) orally once a day  Creon 36,000 units oral delayed release capsule: 3 cap(s) orally 3 times a day  Edarbyclor 40 mg-12.5 mg oral tablet: 1 tab(s) orally once a day  Eliquis 2.5 mg oral tablet: 1 tab(s) orally 2 times a day  esomeprazole 40 mg oral delayed release capsule: 1 cap(s) orally once a day  loperamide 2 mg oral capsule: 2 cap(s) orally 3 times a day as needed for  diarrhea  metFORMIN 500 mg oral tablet, extended release: 1 tab(s) orally 2 times a day  Metoprolol Succinate ER 25 mg oral tablet, extended release: 1 tab(s) orally once a day  Plavix 75 mg oral tablet: 1 tab(s) orally once a day  ranolazine 500 mg oral granule, extended release: 500 milligram(s) orally 2 times a day  Ritalin 10 mg oral tablet: 1 tab(s) orally 2 times a day   amLODIPine 10 mg oral tablet: 1 tab(s) orally once a day  clopidogrel 75 mg oral tablet: 1 tab(s) orally once a day  Eliquis 2.5 mg oral tablet: 1 tab(s) orally 2 times a day  esomeprazole 40 mg oral delayed release capsule: 1 cap(s) orally once a day  losartan 25 mg oral tablet: 1 tab(s) orally once a day  metoprolol succinate 25 mg oral tablet, extended release: 1 tab(s) orally once a day  pancrelipase 36,000 units-114,000 units-180,000 units oral delayed release capsule: 3 cap(s) orally 3 times a day (with meals)  ranolazine 500 mg oral granule, extended release: 500 milligram(s) orally 2 times a day  Ritalin 10 mg oral tablet: 1 tab(s) orally 2 times a day

## 2024-07-16 NOTE — DISCHARGE NOTE PROVIDER - NSDCCPCAREPLAN_GEN_ALL_CORE_FT
PRINCIPAL DISCHARGE DIAGNOSIS  Diagnosis: Fracture of four ribs of left side  Assessment and Plan of Treatment: presented s/p fall at home   CT scan notable for Subacute appearing left anterior third through seventh rib fractures, which are new compared to 3/21/2024.  No evidence of acute osseous abnormality.  Small bilateral pleural effusions have increased in size.  Innumerable bilateral pulmonary nodules and masses, some of which have   progressed in size compared to 3/21/2024.  Physical therapy consulted and reccomends: ****************************        SECONDARY DISCHARGE DIAGNOSES  Diagnosis: Cystitis  Assessment and Plan of Treatment: Found to have Urinary tract infection on admission.   started on IV Ceftriaxone   Urine cx: **********  Finish the medication even if you feel better after you have only taken some of the medication.  Drink enough water and fluids to keep your urine clear or pale yellow.  Avoid caffeine, tea, and carbonated beverages. They tend to irritate your bladder.  Empty your bladder often. Avoid holding urine for long periods of time.  After a bowel movement, women should cleanse from front to back. Use each tissue only once.  SEEK MEDICAL CARE IF:  You have back pain.  You develop a fever.  Your symptoms do not begin to resolve within 3 days.  SEEK IMMEDIATE MEDICAL CARE IF:  You have severe back pain or lower abdominal pain.  You develop chills.  You have nausea or vomiting.  You have continued burning or discomfort with urination.      Diagnosis: HTN (hypertension)  Assessment and Plan of Treatment: Follow up with your medical doctor to establish long term blood pressure treatment goals.   Low salt diet  Activity as tolerated.  Take all medication as prescribed.  Follow up with your medical doctor for routine blood pressure monitoring at your next visit.  Notify your doctor if you have any of the following symptoms:   Dizziness, Lightheadedness, Blurry vision, Headache, Chest pain, Shortness of breath      Diagnosis: Weakness  Assessment and Plan of Treatment:      PRINCIPAL DISCHARGE DIAGNOSIS  Diagnosis: Fracture of four ribs of left side  Assessment and Plan of Treatment: presented s/p fall at home   CT scan notable for Subacute appearing left anterior third through seventh rib fractures, which are new compared to 3/21/2024.  No evidence of acute osseous abnormality.  Small bilateral pleural effusions have increased in size.  Innumerable bilateral pulmonary nodules and masses, some of which have   progressed in size compared to 3/21/2024.  Physical therapy consulted and reccomends: AURELIA LONGORIA following.        SECONDARY DISCHARGE DIAGNOSES  Diagnosis: Cystitis  Assessment and Plan of Treatment: Found to have Urinary tract infection on admission.   started on IV Ceftriaxone   Urine cx: **********  Finish the medication even if you feel better after you have only taken some of the medication.  Drink enough water and fluids to keep your urine clear or pale yellow.  Avoid caffeine, tea, and carbonated beverages. They tend to irritate your bladder.  Empty your bladder often. Avoid holding urine for long periods of time.  After a bowel movement, women should cleanse from front to back. Use each tissue only once.  SEEK MEDICAL CARE IF:  You have back pain.  You develop a fever.  Your symptoms do not begin to resolve within 3 days.  SEEK IMMEDIATE MEDICAL CARE IF:  You have severe back pain or lower abdominal pain.  You develop chills.  You have nausea or vomiting.  You have continued burning or discomfort with urination.      Diagnosis: Paroxysmal atrial fibrillation  Assessment and Plan of Treatment: Continue Eliquis and Metoprolol  Avoid injuuries as you may bleed excessively while on blood thinner    Diagnosis: Weakness  Assessment and Plan of Treatment: Pt. can benefit from additional HHA hours in setting of increased weakness and full dependence for ADLs    Diagnosis: HTN (hypertension)  Assessment and Plan of Treatment: Follow up with your medical doctor to establish long term blood pressure treatment goals.   Low salt diet  Activity as tolerated.  Take all medication as prescribed.  Follow up with your medical doctor for routine blood pressure monitoring at your next visit.  Notify your doctor if you have any of the following symptoms:   Dizziness, Lightheadedness, Blurry vision, Headache, Chest pain, Shortness of breath       PRINCIPAL DISCHARGE DIAGNOSIS  Diagnosis: Fracture of four ribs of left side  Assessment and Plan of Treatment: presented s/p fall at home   CT scan notable for Subacute appearing left anterior third through seventh rib fractures, which are new compared to 3/21/2024.  No evidence of acute osseous abnormality.  Small bilateral pleural effusions have increased in size.  Innumerable bilateral pulmonary nodules and masses, some of which have   progressed in size compared to 3/21/2024.  Physical therapy consulted and reccomends: AURELIA LONGORIA following.        SECONDARY DISCHARGE DIAGNOSES  Diagnosis: Cystitis  Assessment and Plan of Treatment: Found to have Urinary tract infection on admission.   started on IV Ceftriaxone and completed antibiotic therapy on 7/18  Finish the medication even if you feel better after you have only taken some of the medication.  Drink enough water and fluids to keep your urine clear or pale yellow.  Avoid caffeine, tea, and carbonated beverages. They tend to irritate your bladder.  Empty your bladder often. Avoid holding urine for long periods of time.  After a bowel movement, women should cleanse from front to back. Use each tissue only once.  SEEK MEDICAL CARE IF:  You have back pain.  You develop a fever.  Your symptoms do not begin to resolve within 3 days.  SEEK IMMEDIATE MEDICAL CARE IF:  You have severe back pain or lower abdominal pain.  You develop chills.  You have nausea or vomiting.  You have continued burning or discomfort with urination.      Diagnosis: Weakness  Assessment and Plan of Treatment: Pt. can benefit from additional HHA hours in setting of increased weakness and full dependence for ADLs    Diagnosis: HTN (hypertension)  Assessment and Plan of Treatment: Follow up with your medical doctor to establish long term blood pressure treatment goals.   Low salt diet  Activity as tolerated.  Take all medication as prescribed.  Follow up with your medical doctor for routine blood pressure monitoring at your next visit.  Notify your doctor if you have any of the following symptoms:   Dizziness, Lightheadedness, Blurry vision, Headache, Chest pain, Shortness of breath      Diagnosis: Paroxysmal atrial fibrillation  Assessment and Plan of Treatment: Continue Eliquis and Metoprolol  Avoid injuuries as you may bleed excessively while on blood thinner

## 2024-07-16 NOTE — DISCHARGE NOTE PROVIDER - CARE PROVIDER_API CALL
Shiv Bain.  Internal Medicine  [address], [address]  [place] [address]  Phone: [phone]  Fax: [phone]  Established Patient  Follow Up Time: 2 weeks

## 2024-07-16 NOTE — DISCHARGE NOTE PROVIDER - HOSPITAL COURSE
83M, from home with HHA (12hr/day), ambulates with walker at baseline, with PMHx metastatic pancreatic cancer (CTx on hold for 1 month due to weakness), HTN, CAD, HLD, PUD, DM, Afib on Eliquis dementia (AAOx1 at baseline) who presents with chest pain s/p fall. CTH negative for acute pathologies, CT chest showing subacute left anterior third through seventh rib fractures. UA positive. Admitted for UTI and FTT.   started on IV abx for uti, ucs +++++++  continues on abx for uti,   pt reccs******       Given patient's clinical status and current hemodynamic stability decision was made to discharge.   Pt is stable for discharge per attending and is advised to f/u with PCP as out-patient.   Please refer to Pt's complete medical chart with documents for a full hospital course, for this is only a brief summary.       83M, from home with HHA (12hr/day), ambulates with walker at baseline, with PMHx metastatic pancreatic cancer (CTx on hold for 1 month due to weakness), HTN, CAD, HLD, PUD, DM, Afib on Eliquis dementia (AAOx1 at baseline) who presents with chest pain s/p fall. CTH negative for acute pathologies, CT chest showing subacute left anterior third through seventh rib fractures. UA positive. Admitted for UTI and FTT.   started on IV abx for uti, ucs +++++++  continues on abx for uti,   pt reccs VON.        Given patient's clinical status and current hemodynamic stability decision was made to discharge.   Pt is stable for discharge per attending and is advised to f/u with PCP as out-patient.   Please refer to Pt's complete medical chart with documents for a full hospital course, for this is only a brief summary.       83M, from home with HHA (12hr/day), ambulates with walker at baseline, with PMHx metastatic pancreatic cancer (CTx on hold for 1 month due to weakness), HTN, CAD, HLD, PUD, DM, Afib on Eliquis dementia (AAOx1 at baseline) who presents with chest pain s/p fall. CTH negative for acute pathologies, CT chest showing subacute left anterior third through seventh rib fractures. UA positive. Admitted for UTI and FTT.   Started on IV abx for uti.   completed abx therapy   pt reccs VON.          Given patient's clinical status and current hemodynamic stability decision was made to discharge.   Pt is stable for discharge per attending and is advised to f/u with PCP as out-patient.   Please refer to Pt's complete medical chart with documents for a full hospital course, for this is only a brief summary.

## 2024-07-16 NOTE — PROGRESS NOTE ADULT - PROBLEM SELECTOR PLAN 4
Patient with dementia, AAOx1 at baseline   Tried olanzapine which exacerbated agitation per son   s/p Haldol 2mg (QTc 470mmsec)

## 2024-07-17 DIAGNOSIS — Z75.8 OTHER PROBLEMS RELATED TO MEDICAL FACILITIES AND OTHER HEALTH CARE: ICD-10-CM

## 2024-07-17 DIAGNOSIS — S22.39XA FRACTURE OF ONE RIB, UNSPECIFIED SIDE, INITIAL ENCOUNTER FOR CLOSED FRACTURE: ICD-10-CM

## 2024-07-17 LAB
ANION GAP SERPL CALC-SCNC: 8 MMOL/L — SIGNIFICANT CHANGE UP (ref 5–17)
BUN SERPL-MCNC: 10 MG/DL — SIGNIFICANT CHANGE UP (ref 7–18)
CALCIUM SERPL-MCNC: 7.1 MG/DL — LOW (ref 8.4–10.5)
CHLORIDE SERPL-SCNC: 108 MMOL/L — SIGNIFICANT CHANGE UP (ref 96–108)
CO2 SERPL-SCNC: 24 MMOL/L — SIGNIFICANT CHANGE UP (ref 22–31)
CREAT SERPL-MCNC: 0.65 MG/DL — SIGNIFICANT CHANGE UP (ref 0.5–1.3)
EGFR: 93 ML/MIN/1.73M2 — SIGNIFICANT CHANGE UP
GLUCOSE SERPL-MCNC: 89 MG/DL — SIGNIFICANT CHANGE UP (ref 70–99)
HCT VFR BLD CALC: 29.3 % — LOW (ref 39–50)
HGB BLD-MCNC: 9.9 G/DL — LOW (ref 13–17)
MCHC RBC-ENTMCNC: 30.3 PG — SIGNIFICANT CHANGE UP (ref 27–34)
MCHC RBC-ENTMCNC: 33.8 GM/DL — SIGNIFICANT CHANGE UP (ref 32–36)
MCV RBC AUTO: 89.6 FL — SIGNIFICANT CHANGE UP (ref 80–100)
NRBC # BLD: 0 /100 WBCS — SIGNIFICANT CHANGE UP (ref 0–0)
PLATELET # BLD AUTO: 242 K/UL — SIGNIFICANT CHANGE UP (ref 150–400)
POTASSIUM SERPL-MCNC: 3 MMOL/L — LOW (ref 3.5–5.3)
POTASSIUM SERPL-SCNC: 3 MMOL/L — LOW (ref 3.5–5.3)
RBC # BLD: 3.27 M/UL — LOW (ref 4.2–5.8)
RBC # FLD: 19.5 % — HIGH (ref 10.3–14.5)
SODIUM SERPL-SCNC: 140 MMOL/L — SIGNIFICANT CHANGE UP (ref 135–145)
WBC # BLD: 7.79 K/UL — SIGNIFICANT CHANGE UP (ref 3.8–10.5)
WBC # FLD AUTO: 7.79 K/UL — SIGNIFICANT CHANGE UP (ref 3.8–10.5)

## 2024-07-17 RX ORDER — AMLODIPINE BESYLATE 2.5 MG/1
5 TABLET ORAL ONCE
Refills: 0 | Status: COMPLETED | OUTPATIENT
Start: 2024-07-17 | End: 2024-07-17

## 2024-07-17 RX ORDER — POTASSIUM CHLORIDE 600 MG/1
40 TABLET, FILM COATED, EXTENDED RELEASE ORAL EVERY 4 HOURS
Refills: 0 | Status: COMPLETED | OUTPATIENT
Start: 2024-07-17 | End: 2024-07-17

## 2024-07-17 RX ORDER — POTASSIUM CHLORIDE 600 MG/1
40 TABLET, FILM COATED, EXTENDED RELEASE ORAL EVERY 4 HOURS
Refills: 0 | Status: DISCONTINUED | OUTPATIENT
Start: 2024-07-17 | End: 2024-07-17

## 2024-07-17 RX ORDER — AMLODIPINE BESYLATE 2.5 MG/1
10 TABLET ORAL DAILY
Refills: 0 | Status: DISCONTINUED | OUTPATIENT
Start: 2024-07-18 | End: 2024-07-18

## 2024-07-17 RX ADMIN — RANOLAZINE 500 MILLIGRAM(S): 500 TABLET, EXTENDED RELEASE ORAL at 18:06

## 2024-07-17 RX ADMIN — RANOLAZINE 500 MILLIGRAM(S): 500 TABLET, EXTENDED RELEASE ORAL at 05:36

## 2024-07-17 RX ADMIN — CLOPIDOGREL BISULFATE 75 MILLIGRAM(S): 75 TABLET, FILM COATED ORAL at 12:51

## 2024-07-17 RX ADMIN — Medication 3 CAPSULE(S): at 08:28

## 2024-07-17 RX ADMIN — Medication 25 MILLIGRAM(S): at 05:36

## 2024-07-17 RX ADMIN — POTASSIUM CHLORIDE 40 MILLIEQUIVALENT(S): 600 TABLET, FILM COATED, EXTENDED RELEASE ORAL at 15:10

## 2024-07-17 RX ADMIN — POTASSIUM CHLORIDE 40 MILLIEQUIVALENT(S): 600 TABLET, FILM COATED, EXTENDED RELEASE ORAL at 12:05

## 2024-07-17 RX ADMIN — APIXABAN 2.5 MILLIGRAM(S): 5 TABLET, FILM COATED ORAL at 18:06

## 2024-07-17 RX ADMIN — PANTOPRAZOLE SODIUM 40 MILLIGRAM(S): 40 INJECTION, POWDER, FOR SOLUTION INTRAVENOUS at 05:35

## 2024-07-17 RX ADMIN — Medication 100 MILLIGRAM(S): at 18:07

## 2024-07-17 RX ADMIN — AMLODIPINE BESYLATE 5 MILLIGRAM(S): 2.5 TABLET ORAL at 05:36

## 2024-07-17 RX ADMIN — LOSARTAN POTASSIUM 25 MILLIGRAM(S): 100 TABLET, FILM COATED ORAL at 05:36

## 2024-07-17 RX ADMIN — Medication 10 MILLIGRAM(S): at 18:12

## 2024-07-17 RX ADMIN — APIXABAN 2.5 MILLIGRAM(S): 5 TABLET, FILM COATED ORAL at 05:36

## 2024-07-17 RX ADMIN — Medication 3 CAPSULE(S): at 12:51

## 2024-07-17 RX ADMIN — Medication 10 MILLIGRAM(S): at 08:28

## 2024-07-17 NOTE — DIETITIAN INITIAL EVALUATION ADULT - PERTINENT MEDS FT
MEDICATIONS  (STANDING):  amLODIPine   Tablet 5 milliGRAM(s) Oral once  apixaban 2.5 milliGRAM(s) Oral two times a day  cefTRIAXone   IVPB 1000 milliGRAM(s) IV Intermittent every 24 hours  clopidogrel Tablet 75 milliGRAM(s) Oral daily  losartan 25 milliGRAM(s) Oral daily  methylphenidate 10 milliGRAM(s) Oral <User Schedule>  metoprolol succinate ER 25 milliGRAM(s) Oral daily  pancrelipase  (CREON 36,000 Lipase Units) 3 Capsule(s) Oral three times a day with meals  pantoprazole    Tablet 40 milliGRAM(s) Oral before breakfast  potassium chloride    Tablet ER 40 milliEquivalent(s) Oral every 4 hours  ranolazine 500 milliGRAM(s) Oral two times a day  sodium chloride 0.9%. 1000 milliLiter(s) (125 mL/Hr) IV Continuous <Continuous>    MEDICATIONS  (PRN):  haloperidol    Injectable 1 milliGRAM(s) IntraMuscular every 6 hours PRN Agitation

## 2024-07-17 NOTE — DIETITIAN INITIAL EVALUATION ADULT - ETIOLOGY
inadequate intake with  chronic comorbidities including metastatic pancreatic cancer, s/p treatment

## 2024-07-17 NOTE — DIETITIAN INITIAL EVALUATION ADULT - OTHER INFO
Pt lives home with family, HHA help PTA, visited with wife/son at bedside, family providing all information, pt alert, confused with dementia; Reported bedbound at home, decreased intake x 1 to 1.5m, wt loss from usual wt 220 lb to 150 to 160 lb over 1.5y, was stable over past 2-3m; denied GI distress, chewing or swallowing problem; Unknown food allergy, no specific food choices/intolerance, family brining home made cultural food, willing to try oral nutritional supplement; Per RN, pt tolerated most of breakfast well with feeding assistance  Pt lives home with family, HHA help PTA, visited with wife/son at bedside, family providing all information, pt alert, confused with dementia; Reported bedbound at home, decreased intake x 1 to 1.5m, wt loss from usual wt 220 lb to 150~160 lb over 1.5y, was stable over past 2-3m; denied GI distress, chewing or swallowing problem; Unknown food allergy, no specific food choices/intolerance, family brining home made cultural food, willing to try oral nutritional supplement; Per RN, pt tolerated most of breakfast well with feeding assistance

## 2024-07-17 NOTE — PHYSICAL THERAPY INITIAL EVALUATION ADULT - GAIT DEVIATIONS NOTED, PT EVAL
decreased lesa/decreased velocity of limb motion/decreased stride length/decreased weight-shifting ability

## 2024-07-17 NOTE — DIETITIAN INITIAL EVALUATION ADULT - PROBLEM SELECTOR PLAN 5
Hx of HTN on amlodipine 5mg qd, Azilsartan / Chlorthalidone 40/12.5mg qd   c/w amlodipine 5mg, azilsartan 40mg   Holding chlorthalidone 2/2 frequents falls

## 2024-07-17 NOTE — PROGRESS NOTE ADULT - PROBLEM SELECTOR PLAN 5
Hx of HTN on amlodipine 5mg qd, Azilsartan / Chlorthalidone 40/12.5mg qd   c/w amlodipine 5mg, azilsartan 40mg   Holding chlorthalidone 2/2 frequents falls
uncontrolled overnight   amlodipine dose increased to 10  home regimen: amlodipine 5mg qd, Azilsartan / Chlorthalidone 40/12.5mg qd   c/w amlodipine 5mg, azilsartan 40mg   if persistently elevated consider resuming rest of home meds

## 2024-07-17 NOTE — PROGRESS NOTE ADULT - PROBLEM SELECTOR PLAN 10
patient from home   multiple falls   family requesting placement   pending PT eval   NCM to follow for D/C planning

## 2024-07-17 NOTE — PROGRESS NOTE ADULT - ASSESSMENT
Patient is a 83M, from home with HHA (12hr/day), ambulates with walker at baseline, with PMHx metastatic pancreatic cancer (CTx on hold for 1 month due to weakness), HTN, CAD, HLD, PUD, DM, Afib on Eliquis dementia (AAOx1 at baseline) who presents with chest pain and fall. CTH negative for acute pathologies, CT chest showing subacute left anterior third through seventh rib fractures. UA positive. Admitted for UTI and FTT.   continues on abx for uti, pt consulted for further eval. 
83M, from home with HHA (12hr/day), ambulates with walker at baseline, with PMHx metastatic pancreatic cancer (CTx on hold for 1 month due to weakness), HTN, CAD, HLD, PUD, DM, Afib on Eliquis dementia (AAOx1 at baseline) who presents with chest pain and fall. CTH negative for acute pathologies, CT chest showing subacute left anterior third through seventh rib fractures. UA positive. Admitted for work up found + UA continues on abx for uti, pt consulted foR D/C PLANNING

## 2024-07-17 NOTE — DIETITIAN INITIAL EVALUATION ADULT - POUNDS LOST/GAINED
Discharge Facility:[facility]  Discharge Diagnosis:Acute saddle pulmonary embolism without acute cor pulmonale (CMS/HCC)     Admission Date:2/6/24  Discharge Date: 2/16/24    PCP Appointment Date:2/23/24  Specialist Appointment Date:N/A   Hospital Encounter and Summary: Linked   See discharge assessment below for further details  Engagement  Call Start Time: 1553 (2/19/2024  3:52 PM)    Medications  Medications reviewed with patient/caregiver?: Yes (2/19/2024  3:52 PM)  Is the patient having any side effects they believe may be caused by any medication additions or changes?: No (2/19/2024  3:52 PM)  Does the patient have all medications ordered at discharge?: Yes (2/19/2024  3:52 PM)  Prescription Comments: apixaban 5 mg tablet; Commonly known as: Eliquis; Take 2 tablets (10   mg) by mouth 2 times a day for 13 doses. (2/19/2024  3:52 PM)  Medication Comments: see med list (2/19/2024  3:52 PM)    Appointments  Does the patient have a primary care provider?: Yes (2/19/2024  3:52 PM)  Care Management Interventions: Verified appointment date/time/provider (FUV 2/23/23) (2/19/2024  3:52 PM)  Has the patient kept scheduled appointments due by today?: Yes (2/19/2024  3:52 PM)  Care Management Interventions: Advised patient to keep appointment (2/19/2024  3:52 PM)    Patient Teaching  Does the patient have access to their discharge instructions?: Yes (2/19/2024  3:52 PM)  Care Management Interventions: Reviewed instructions with patient (2/19/2024  3:52 PM)  What is the patient's perception of their health status since discharge?: Improving (2/19/2024  3:52 PM)  Is the patient/caregiver able to teach back the hierarchy of who to call/visit for symptoms/problems? PCP, Specialist, Home Health nurse, Urgent Care, ED, 911: Yes (2/19/2024  3:52 PM)    Issues Requiring Follow-Up  Follow up with Dr Regan Yates, DILIAN     59

## 2024-07-17 NOTE — DIETITIAN INITIAL EVALUATION ADULT - NS FNS DIET ORDER
Diet, Soft and Bite Sized:   Consistent Carbohydrate {No Snacks}  DASH/TLC {Sodium & Cholesterol Restricted}  Supplement Feeding Modality:  Oral  Glucerna Shake Cans or Servings Per Day:  1       Frequency:  Two Times a day (07-17-24 @ 10:51)

## 2024-07-17 NOTE — DIETITIAN INITIAL EVALUATION ADULT - ADD RECOMMEND
Severe Malnutrition identified; May consider to Least restricted diet due to medical conditions if persistent poor intake

## 2024-07-17 NOTE — DIETITIAN NUTRITION RISK NOTIFICATION - ADDITIONAL COMMENTS/DIETITIAN RECOMMENDATIONS
Malnutrition Diagnosis Parameters: intake <75% needs x >1m, wt loss >20%x 1y, moderate muscle loss, mild fat loss, debility, Failure to Thrive per MD

## 2024-07-17 NOTE — DIETITIAN NUTRITION RISK NOTIFICATION - TREATMENT: THE FOLLOWING DIET HAS BEEN RECOMMENDED
Diet, Soft and Bite Sized:   Consistent Carbohydrate {No Snacks}  DASH/TLC {Sodium & Cholesterol Restricted}  Supplement Feeding Modality:  Oral  Glucerna Shake Cans or Servings Per Day:  1       Frequency:  Two Times a day (07-17-24 @ 10:51) [Active]

## 2024-07-17 NOTE — PHYSICAL THERAPY INITIAL EVALUATION ADULT - GENERAL OBSERVATIONS, REHAB EVAL
Patient received in supine, [place] speaking; AxOx3; presents w/ edema to (L) hand; mutliple hematoma on both UE.

## 2024-07-17 NOTE — PROGRESS NOTE ADULT - PROBLEM SELECTOR PLAN 3
Hx of metastatic pancreatic cancer (lung mets)   Follows oncologist Ebony Garcia  Last CTx 1 month ago, which was held due to increased weakness  c/w creon 36,000U 3 cap TID pre-meal
UA+  C/w ceftriaxone to complete course   UCx neg

## 2024-07-17 NOTE — DIETITIAN INITIAL EVALUATION ADULT - PROBLEM SELECTOR PLAN 2
FTT due to metastatic cancer s/p fall on Thursday (negative for acute fractures)  Nutrition consult   On ritalin 10mg 8AM, 7PM per his oncologist   Case management consulted for increase HHA hours

## 2024-07-17 NOTE — PROGRESS NOTE ADULT - PROBLEM SELECTOR PLAN 4
Patient with dementia, AAOx1 at baseline   Tried olanzapine which exacerbated agitation per son   s/p Haldol 2mg (QTc 470mmsec)  fall/saftey precautions

## 2024-07-17 NOTE — DIETITIAN INITIAL EVALUATION ADULT - PERTINENT LABORATORY DATA
07-17    140  |  108  |  10  ----------------------------<  89  3.0<L>   |  24  |  0.65    Ca    7.1<L>      17 Jul 2024 05:08  Phos  2.2     07-16  Mg     1.6     07-16    TPro  5.5<L>  /  Alb  2.5<L>  /  TBili  0.8  /  DBili  x   /  AST  30  /  ALT  22  /  AlkPhos  126<H>  07-15  A1C with Estimated Average Glucose Result: 6.9 % (12-31-23 @ 06:18)

## 2024-07-17 NOTE — DIETITIAN INITIAL EVALUATION ADULT - SIGNS/SYMPTOMS
intake <75% needs x >1m, wt loss >20%x 1y, moderate muscle loss, mild fat loss, debility, FTT per MD

## 2024-07-17 NOTE — DIETITIAN INITIAL EVALUATION ADULT - PROBLEM SELECTOR PLAN 3
Hx of metastatic pancreatic cancer (lung mets)   Follows oncologist Ebony Garcia  Last CTx 1 month ago, which was held due to increased weakness  c/w creon 36,000U 3 cap TID pre-meal

## 2024-07-17 NOTE — PHYSICAL THERAPY INITIAL EVALUATION ADULT - DIAGNOSIS, PT EVAL
(ICF Model) Pt. present w/deficits in Body Structures/Function (Impairments), incl: Strength, Balance, ,  leading to deficits in performing transfers, gait and ADL's

## 2024-07-17 NOTE — PROGRESS NOTE ADULT - PROBLEM SELECTOR PLAN 8
Hx of Afib on eliquis 2.5mg bid   EKG: NSR, RBBB   c/w home meds
Hx of Afib on eliquis 2.5mg bid   EKG: NSR, RBBB   c/w home meds

## 2024-07-17 NOTE — DIETITIAN INITIAL EVALUATION ADULT - ORAL INTAKE PTA/DIET HISTORY
On regular/soft food at home, 3 smaller meals, example of typical food including: hot cereal, boiled egg, noodle, soup. fish/chicken/meat/turkey, mashed potato, home made shake with milk, honey, beet, nuts. Not on any oral nutritional supplement PTA

## 2024-07-17 NOTE — DIETITIAN INITIAL EVALUATION ADULT - FACTORS AFF FOOD INTAKE
acute on chronic comorbidities including metastatic pancreatic cancer, chemotherapy on hold x 1 due to weakness. Failure to Thrive/persistent lack of appetite/Holiness/ethnic/cultural/personal food preferences

## 2024-07-18 ENCOUNTER — TRANSCRIPTION ENCOUNTER (OUTPATIENT)
Age: 84
End: 2024-07-18

## 2024-07-18 VITALS
RESPIRATION RATE: 18 BRPM | OXYGEN SATURATION: 98 % | TEMPERATURE: 98 F | HEART RATE: 84 BPM | SYSTOLIC BLOOD PRESSURE: 120 MMHG | DIASTOLIC BLOOD PRESSURE: 88 MMHG

## 2024-07-18 LAB
ANION GAP SERPL CALC-SCNC: 8 MMOL/L — SIGNIFICANT CHANGE UP (ref 5–17)
BUN SERPL-MCNC: 13 MG/DL — SIGNIFICANT CHANGE UP (ref 7–18)
CALCIUM SERPL-MCNC: 7.5 MG/DL — LOW (ref 8.4–10.5)
CHLORIDE SERPL-SCNC: 106 MMOL/L — SIGNIFICANT CHANGE UP (ref 96–108)
CO2 SERPL-SCNC: 26 MMOL/L — SIGNIFICANT CHANGE UP (ref 22–31)
CREAT SERPL-MCNC: 0.72 MG/DL — SIGNIFICANT CHANGE UP (ref 0.5–1.3)
EGFR: 90 ML/MIN/1.73M2 — SIGNIFICANT CHANGE UP
GLUCOSE SERPL-MCNC: 109 MG/DL — HIGH (ref 70–99)
HCT VFR BLD CALC: 30.6 % — LOW (ref 39–50)
HGB BLD-MCNC: 10.2 G/DL — LOW (ref 13–17)
MCHC RBC-ENTMCNC: 30.3 PG — SIGNIFICANT CHANGE UP (ref 27–34)
MCHC RBC-ENTMCNC: 33.3 GM/DL — SIGNIFICANT CHANGE UP (ref 32–36)
MCV RBC AUTO: 90.8 FL — SIGNIFICANT CHANGE UP (ref 80–100)
NRBC # BLD: 0 /100 WBCS — SIGNIFICANT CHANGE UP (ref 0–0)
PLATELET # BLD AUTO: 263 K/UL — SIGNIFICANT CHANGE UP (ref 150–400)
POTASSIUM SERPL-MCNC: 3.9 MMOL/L — SIGNIFICANT CHANGE UP (ref 3.5–5.3)
POTASSIUM SERPL-SCNC: 3.9 MMOL/L — SIGNIFICANT CHANGE UP (ref 3.5–5.3)
RBC # BLD: 3.37 M/UL — LOW (ref 4.2–5.8)
RBC # FLD: 19 % — HIGH (ref 10.3–14.5)
SARS-COV-2 RNA SPEC QL NAA+PROBE: SIGNIFICANT CHANGE UP
SODIUM SERPL-SCNC: 140 MMOL/L — SIGNIFICANT CHANGE UP (ref 135–145)
WBC # BLD: 9.78 K/UL — SIGNIFICANT CHANGE UP (ref 3.8–10.5)
WBC # FLD AUTO: 9.78 K/UL — SIGNIFICANT CHANGE UP (ref 3.8–10.5)

## 2024-07-18 RX ORDER — LIPASE/PROTEASE/AMYLASE 4.5-25-20K
3 CAPSULE,DELAYED RELEASE (ENTERIC COATED) ORAL
Qty: 0 | Refills: 0 | DISCHARGE
Start: 2024-07-18

## 2024-07-18 RX ORDER — METOPROLOL TARTRATE 50 MG
1 TABLET ORAL
Refills: 0 | DISCHARGE

## 2024-07-18 RX ORDER — AMLODIPINE BESYLATE 2.5 MG/1
1 TABLET ORAL
Qty: 0 | Refills: 0 | DISCHARGE
Start: 2024-07-18

## 2024-07-18 RX ORDER — LOSARTAN POTASSIUM 100 MG/1
1 TABLET, FILM COATED ORAL
Qty: 0 | Refills: 0 | DISCHARGE
Start: 2024-07-18

## 2024-07-18 RX ORDER — ACARBOSE 50 MG/1
1 TABLET ORAL
Refills: 0 | DISCHARGE

## 2024-07-18 RX ORDER — CLOPIDOGREL BISULFATE 75 MG/1
1 TABLET, FILM COATED ORAL
Refills: 0 | DISCHARGE

## 2024-07-18 RX ORDER — METOPROLOL TARTRATE 50 MG
1 TABLET ORAL
Qty: 0 | Refills: 0 | DISCHARGE
Start: 2024-07-18

## 2024-07-18 RX ORDER — AZILSARTAN KAMEDOXOMIL AND CHLORTHALIDONE 40; 25 MG/1; MG/1
1 TABLET ORAL
Refills: 0 | DISCHARGE

## 2024-07-18 RX ORDER — LOPERAMIDE HCL 2 MG
2 CAPSULE ORAL
Refills: 0 | DISCHARGE

## 2024-07-18 RX ORDER — CLOPIDOGREL BISULFATE 75 MG/1
1 TABLET, FILM COATED ORAL
Qty: 0 | Refills: 0 | DISCHARGE
Start: 2024-07-18

## 2024-07-18 RX ORDER — LIPASE/PROTEASE/AMYLASE 4.5-25-20K
3 CAPSULE,DELAYED RELEASE (ENTERIC COATED) ORAL
Refills: 0 | DISCHARGE

## 2024-07-18 RX ORDER — AMLODIPINE BESYLATE 2.5 MG/1
1 TABLET ORAL
Refills: 0 | DISCHARGE

## 2024-07-18 RX ADMIN — RANOLAZINE 500 MILLIGRAM(S): 500 TABLET, EXTENDED RELEASE ORAL at 06:08

## 2024-07-18 RX ADMIN — PANTOPRAZOLE SODIUM 40 MILLIGRAM(S): 40 INJECTION, POWDER, FOR SOLUTION INTRAVENOUS at 06:07

## 2024-07-18 RX ADMIN — LOSARTAN POTASSIUM 25 MILLIGRAM(S): 100 TABLET, FILM COATED ORAL at 06:07

## 2024-07-18 RX ADMIN — CLOPIDOGREL BISULFATE 75 MILLIGRAM(S): 75 TABLET, FILM COATED ORAL at 12:39

## 2024-07-18 RX ADMIN — Medication 3 CAPSULE(S): at 12:38

## 2024-07-18 RX ADMIN — Medication 10 MILLIGRAM(S): at 08:01

## 2024-07-18 RX ADMIN — Medication 3 CAPSULE(S): at 07:59

## 2024-07-18 RX ADMIN — APIXABAN 2.5 MILLIGRAM(S): 5 TABLET, FILM COATED ORAL at 06:07

## 2024-07-18 RX ADMIN — AMLODIPINE BESYLATE 10 MILLIGRAM(S): 2.5 TABLET ORAL at 06:07

## 2024-07-18 RX ADMIN — Medication 25 MILLIGRAM(S): at 06:07

## 2024-07-18 NOTE — DISCHARGE NOTE NURSING/CASE MANAGEMENT/SOCIAL WORK - NSDCPETBCESMAN_GEN_ALL_CORE
If you are a smoker, it is important for your health to stop smoking. Please be aware that second hand smoke is also harmful.
Face Mask

## 2024-07-18 NOTE — PROGRESS NOTE ADULT - NUTRITIONAL ASSESSMENT
This patient has been assessed with a concern for Malnutrition and has been determined to have a diagnosis/diagnoses of Severe protein-calorie malnutrition.    This patient is being managed with:   Diet Soft and Bite Sized-  Consistent Carbohydrate {No Snacks}  DASH/TLC {Sodium & Cholesterol Restricted}  Supplement Feeding Modality:  Oral  Glucerna Shake Cans or Servings Per Day:  1       Frequency:  Two Times a day  Entered: Jul 17 2024 10:50AM  

## 2024-07-18 NOTE — PROGRESS NOTE ADULT - SUBJECTIVE AND OBJECTIVE BOX
INTERVAL HPI/OVERNIGHT EVENTS:  Patient seem,no new acute issues  VITAL SIGNS:  T(F): 97.9 (07-18-24 @ 13:36)  HR: 70 (07-18-24 @ 13:36)  BP: 105/61 (07-18-24 @ 13:36)  RR: 18 (07-18-24 @ 13:36)  SpO2: 99% (07-18-24 @ 13:36)  Wt(kg): --    PHYSICAL EXAM:  awake  Constitutional:  Eyes:  ENMT:perrla  Neck:  Respiratory:clear  Cardiovascular:s1s2,m-none  Gastrointestinal:soft,bs pos  Extremities:  Vascular:  Neurological:no focal deficit  Musculoskeletal:    MEDICATIONS  (STANDING):  amLODIPine   Tablet 10 milliGRAM(s) Oral daily  apixaban 2.5 milliGRAM(s) Oral two times a day  cefTRIAXone   IVPB 1000 milliGRAM(s) IV Intermittent every 24 hours  clopidogrel Tablet 75 milliGRAM(s) Oral daily  losartan 25 milliGRAM(s) Oral daily  methylphenidate 10 milliGRAM(s) Oral <User Schedule>  metoprolol succinate ER 25 milliGRAM(s) Oral daily  pancrelipase  (CREON 36,000 Lipase Units) 3 Capsule(s) Oral three times a day with meals  pantoprazole    Tablet 40 milliGRAM(s) Oral before breakfast  ranolazine 500 milliGRAM(s) Oral two times a day    MEDICATIONS  (PRN):  haloperidol    Injectable 1 milliGRAM(s) IntraMuscular every 6 hours PRN Agitation      Allergies    No Known Allergies    Intolerances        LABS:                        10.2   9.78  )-----------( 263      ( 18 Jul 2024 04:46 )             30.6     07-18    140  |  106  |  13  ----------------------------<  109<H>  3.9   |  26  |  0.72    Ca    7.5<L>      18 Jul 2024 04:46        Urinalysis Basic - ( 18 Jul 2024 04:46 )    Color: x / Appearance: x / SG: x / pH: x  Gluc: 109 mg/dL / Ketone: x  / Bili: x / Urobili: x   Blood: x / Protein: x / Nitrite: x   Leuk Esterase: x / RBC: x / WBC x   Sq Epi: x / Non Sq Epi: x / Bacteria: x        RADIOLOGY & ADDITIONAL TESTS:      Assessment and Plan:   · Assessment	  Patient is a 83M, from home with HHA (12hr/day), ambulates with walker at baseline, with PMHx metastatic pancreatic cancer (CTx on hold for 1 month due to weakness), HTN, CAD, HLD, PUD, DM, Afib on Eliquis dementia (AAOx1 at baseline) who presents with chest pain and fall. CTH negative for acute pathologies, CT chest showing subacute left anterior third through seventh rib fractures. UA positive. Admitted for UTI and FTT.   continues on abx for uti, pt consulted for further eval.        Problem/Plan - 1:  ·  Problem: Acute UTI.   ·  Plan: UA+  C/w ceftriaxone   f/u UCx.     Problem/Plan - 2:  ·  Problem: Failure to thrive in adult.   ·  Plan: FTT due to metastatic cancer s/p fall on Thursday (negative for acute fractures)  Nutrition consult   On ritalin 10mg 8AM, 7PM per his oncologist   Case management consulted for increase HHA hours.     Problem/Plan - 3:  ·  Problem: Metastasis from pancreatic cancer.   ·  Plan: Hx of metastatic pancreatic cancer (lung mets)   Follows oncologist Ebony Garcia  Last CTx 1 month ago, which was held due to increased weakness  c/w creon 36,000U 3 cap TID pre-meal.     Problem/Plan - 4:  ·  Problem: Agitation.   ·  Plan: Patient with dementia, AAOx1 at baseline   Tried olanzapine which exacerbated agitation per son   s/p Haldol 2mg (QTc 470mmsec).     Problem/Plan - 5:  ·  Problem: HTN (hypertension).   ·  Plan: Hx of HTN on amlodipine 5mg qd, Azilsartan / Chlorthalidone 40/12.5mg qd   c/w amlodipine 5mg, azilsartan 40mg   Holding chlorthalidone 2/2 frequents falls.     Problem/Plan - 6:  ·  Problem: PUD (peptic ulcer disease).   ·  Plan: Hx of PUD on esomeprazole 40mg qd   c/w home meds.     Problem/Plan - 7:  ·  Problem: CAD (coronary artery disease).   ·  Plan: Hx of CAD on plavix 75mg qd,     Problem/Plan - 8:  ·  Problem: Paroxysmal atrial fibrillation.   ·  Plan: Hx of Afib on eliquis 2.5mg bid   EKG: NSR, RBBB   c/w home meds.     Problem/Plan - 9:  ·  Problem: Prophylactic measure.   ·  Plan: DVT prophylaxis: on eliquis.        
INTERVAL HPI/OVERNIGHT EVENTS:  Patient seen,events noticed  VITAL SIGNS:  T(F): 97.7 (07-17-24 @ 11:26)  HR: 63 (07-17-24 @ 11:26)  BP: 109/73 (07-17-24 @ 11:26)  RR: 18 (07-17-24 @ 11:26)  SpO2: 97% (07-17-24 @ 11:26)  Wt(kg): --    PHYSICAL EXAM:  awake  Constitutional:  Eyes:  ENMT:perrla  Neck:  Respiratory:clear  Cardiovascular:s1s2,m-none  Gastrointestinal:soft,bs pos  Extremities:  Vascular:  Neurological:no focal deficit  Musculoskeletal:    MEDICATIONS  (STANDING):  amLODIPine   Tablet 5 milliGRAM(s) Oral once  apixaban 2.5 milliGRAM(s) Oral two times a day  cefTRIAXone   IVPB 1000 milliGRAM(s) IV Intermittent every 24 hours  clopidogrel Tablet 75 milliGRAM(s) Oral daily  losartan 25 milliGRAM(s) Oral daily  methylphenidate 10 milliGRAM(s) Oral <User Schedule>  metoprolol succinate ER 25 milliGRAM(s) Oral daily  pancrelipase  (CREON 36,000 Lipase Units) 3 Capsule(s) Oral three times a day with meals  pantoprazole    Tablet 40 milliGRAM(s) Oral before breakfast  potassium chloride   Powder 40 milliEquivalent(s) Oral every 4 hours  ranolazine 500 milliGRAM(s) Oral two times a day    MEDICATIONS  (PRN):  haloperidol    Injectable 1 milliGRAM(s) IntraMuscular every 6 hours PRN Agitation      Allergies    No Known Allergies    Intolerances        LABS:                        9.9    7.79  )-----------( 242      ( 17 Jul 2024 05:08 )             29.3     07-17    140  |  108  |  10  ----------------------------<  89  3.0<L>   |  24  |  0.65    Ca    7.1<L>      17 Jul 2024 05:08  Phos  2.2     07-16  Mg     1.6     07-16    TPro  5.5<L>  /  Alb  2.5<L>  /  TBili  0.8  /  DBili  x   /  AST  30  /  ALT  22  /  AlkPhos  126<H>  07-15    PT/INR - ( 15 Jul 2024 13:47 )   PT: 13.2 sec;   INR: 1.16 ratio         PTT - ( 15 Jul 2024 13:47 )  PTT:37.1 sec  Urinalysis Basic - ( 17 Jul 2024 05:08 )    Color: x / Appearance: x / SG: x / pH: x  Gluc: 89 mg/dL / Ketone: x  / Bili: x / Urobili: x   Blood: x / Protein: x / Nitrite: x   Leuk Esterase: x / RBC: x / WBC x   Sq Epi: x / Non Sq Epi: x / Bacteria: x        RADIOLOGY & ADDITIONAL TESTS:      Assessment and Plan:   · Assessment	  Patient is a 83M, from home with HHA (12hr/day), ambulates with walker at baseline, with PMHx metastatic pancreatic cancer (CTx on hold for 1 month due to weakness), HTN, CAD, HLD, PUD, DM, Afib on Eliquis dementia (AAOx1 at baseline) who presents with chest pain and fall. CTH negative for acute pathologies, CT chest showing subacute left anterior third through seventh rib fractures. UA positive. Admitted for UTI and FTT.   continues on abx for uti, pt consulted for further eval.        Problem/Plan - 1:  ·  Problem: Acute UTI.   ·  Plan: UA+  C/w ceftriaxone   f/u UCx.     Problem/Plan - 2:  ·  Problem: Failure to thrive in adult.   ·  Plan: FTT due to metastatic cancer s/p fall on Thursday (negative for acute fractures)  Nutrition consult   On ritalin 10mg 8AM, 7PM per his oncologist   Case management consulted for increase HHA hours.     Problem/Plan - 3:  ·  Problem: Metastasis from pancreatic cancer.   ·  Plan: Hx of metastatic pancreatic cancer (lung mets)   Follows oncologist Ebony Garcia  Last CTx 1 month ago, which was held due to increased weakness  c/w creon 36,000U 3 cap TID pre-meal.     Problem/Plan - 4:  ·  Problem: Agitation.   ·  Plan: Patient with dementia, AAOx1 at baseline   Tried olanzapine which exacerbated agitation per son   s/p Haldol 2mg (QTc 470mmsec).     Problem/Plan - 5:  ·  Problem: HTN (hypertension).   ·  Plan: Hx of HTN on amlodipine 5mg qd, Azilsartan / Chlorthalidone 40/12.5mg qd   c/w amlodipine 5mg, azilsartan 40mg   Holding chlorthalidone 2/2 frequents falls.     Problem/Plan - 6:  ·  Problem: PUD (peptic ulcer disease).   ·  Plan: Hx of PUD on esomeprazole 40mg qd   c/w home meds.     Problem/Plan - 7:  ·  Problem: CAD (coronary artery disease).   ·  Plan: Hx of CAD on plavix 75mg qd,     Problem/Plan - 8:  ·  Problem: Paroxysmal atrial fibrillation.   ·  Plan: Hx of Afib on eliquis 2.5mg bid   EKG: NSR, RBBB   c/w home meds.     Problem/Plan - 9:  ·  Problem: Prophylactic measure.   ·  Plan: DVT prophylaxis: on eliquis.      
NP Note discussed with  primary attending    Patient is a 83y old  Male who presents with a chief complaint of FTT (17 Jul 2024 12:39)      INTERVAL HPI/OVERNIGHT EVENTS: confusion overnight     MEDICATIONS  (STANDING):  apixaban 2.5 milliGRAM(s) Oral two times a day  cefTRIAXone   IVPB 1000 milliGRAM(s) IV Intermittent every 24 hours  clopidogrel Tablet 75 milliGRAM(s) Oral daily  losartan 25 milliGRAM(s) Oral daily  methylphenidate 10 milliGRAM(s) Oral <User Schedule>  metoprolol succinate ER 25 milliGRAM(s) Oral daily  pancrelipase  (CREON 36,000 Lipase Units) 3 Capsule(s) Oral three times a day with meals  pantoprazole    Tablet 40 milliGRAM(s) Oral before breakfast  potassium chloride   Powder 40 milliEquivalent(s) Oral every 4 hours  ranolazine 500 milliGRAM(s) Oral two times a day    MEDICATIONS  (PRN):  haloperidol    Injectable 1 milliGRAM(s) IntraMuscular every 6 hours PRN Agitation      __________________________________________________  REVIEW OF SYSTEMS:    limited 2/2 cognitive impairment     Vital Signs Last 24 Hrs  T(C): 36.5 (17 Jul 2024 11:26), Max: 37.1 (16 Jul 2024 21:20)  T(F): 97.7 (17 Jul 2024 11:26), Max: 98.7 (16 Jul 2024 21:20)  HR: 56 (17 Jul 2024 12:53) (56 - 74)  BP: 130/79 (17 Jul 2024 12:53) (109/73 - 173/114)  BP(mean): 86 (17 Jul 2024 11:26) (86 - 103)  RR: 18 (17 Jul 2024 11:26) (17 - 18)  SpO2: 97% (17 Jul 2024 11:26) (93% - 97%)    Parameters below as of 17 Jul 2024 11:26  Patient On (Oxygen Delivery Method): room air      ________________________________________________  PHYSICAL EXAM:  GENERAL: elderly, frail   HEENT: Normocephalic;  conjunctivae and sclerae clear;   NECK : supple  CHEST/LUNG: Clear to ausculitation bilaterally with good air entry   HEART: S1 S2  regular; no murmurs, gallops or rubs  ABDOMEN: Soft, Nontender, Nondistended; Bowel sounds present  EXTREMITIES: no cyanosis; no edema; no calf tenderness  SKIN: warm and dry; no rash  NERVOUS SYSTEM: a&ox1     _________________________________________________  LABS:                        9.9    7.79  )-----------( 242      ( 17 Jul 2024 05:08 )             29.3     07-17    140  |  108  |  10  ----------------------------<  89  3.0<L>   |  24  |  0.65    Ca    7.1<L>      17 Jul 2024 05:08  Phos  2.2     07-16  Mg     1.6     07-16    TPro  5.5<L>  /  Alb  2.5<L>  /  TBili  0.8  /  DBili  x   /  AST  30  /  ALT  22  /  AlkPhos  126<H>  07-15    PT/INR - ( 15 Jul 2024 13:47 )   PT: 13.2 sec;   INR: 1.16 ratio         PTT - ( 15 Jul 2024 13:47 )  PTT:37.1 sec  Urinalysis Basic - ( 17 Jul 2024 05:08 )    Color: x / Appearance: x / SG: x / pH: x  Gluc: 89 mg/dL / Ketone: x  / Bili: x / Urobili: x   Blood: x / Protein: x / Nitrite: x   Leuk Esterase: x / RBC: x / WBC x   Sq Epi: x / Non Sq Epi: x / Bacteria: x      CAPILLARY BLOOD GLUCOSE    RADIOLOGY & ADDITIONAL TESTS:   < from: CT Chest No Cont (07.15.24 @ 15:38) >    IMPRESSION:.    Subacute appearing left anterior third through seventh rib fractures,   which are new compared to 3/21/2024. Recommend correlation for history of   trauma.    No evidence of acute osseous abnormality.    Small bilateral pleural effusions have increased in size.    Innumerable bilateral pulmonary nodules and masses, some of which have   progressed in size compared to 3/21/2024.    --- End of Report ---        < end of copied text >    < from: CT Head No Cont (07.15.24 @ 15:38) >  IMPRESSION:  No acute intracranial hemorrhage, mass effect, or midline shift.        --- End of Report ---        < end of copied text >  Imaging Personally Reviewed:  YES/    Consultant(s) Notes Reviewed:   YES/    Plan of care was discussed with patient and /or primary care giver; all questions and concerns were addressed and care was aligned with patient's wishes.    
NP Note discussed with  Primary Attending    Patient is a 83y old  Male who presents with a chief complaint of FTT (15 Jul 2024 20:25)      INTERVAL HPI/OVERNIGHT EVENTS: no new complaints    MEDICATIONS  (STANDING):  amLODIPine   Tablet 5 milliGRAM(s) Oral daily  apixaban 2.5 milliGRAM(s) Oral two times a day  cefTRIAXone   IVPB 1000 milliGRAM(s) IV Intermittent every 24 hours  clopidogrel Tablet 75 milliGRAM(s) Oral daily  losartan 25 milliGRAM(s) Oral daily  methylphenidate 10 milliGRAM(s) Oral <User Schedule>  metoprolol succinate ER 25 milliGRAM(s) Oral daily  pancrelipase  (CREON 36,000 Lipase Units) 3 Capsule(s) Oral three times a day with meals  pantoprazole    Tablet 40 milliGRAM(s) Oral before breakfast  potassium phosphate / sodium phosphate Powder (PHOS-NaK) 1 Packet(s) Oral once  ranolazine 500 milliGRAM(s) Oral two times a day  sodium chloride 0.9%. 1000 milliLiter(s) (125 mL/Hr) IV Continuous <Continuous>    MEDICATIONS  (PRN):  OLANZapine 2.5 milliGRAM(s) Oral every 8 hours PRN agitation      __________________________________________________  REVIEW OF SYSTEMS: unable to fully assess 2/2 mentation; denies pain when asked       Vital Signs Last 24 Hrs  T(C): 36.7 (16 Jul 2024 04:56), Max: 36.7 (16 Jul 2024 04:56)  T(F): 98.1 (16 Jul 2024 04:56), Max: 98.1 (16 Jul 2024 04:56)  HR: 62 (16 Jul 2024 04:56) (62 - 67)  BP: 153/83 (16 Jul 2024 04:56) (153/83 - 182/82)  BP(mean): 110 (15 Jul 2024 22:45) (110 - 110)  RR: 17 (16 Jul 2024 04:56) (17 - 18)  SpO2: 91% (16 Jul 2024 04:56) (91% - 99%)    Parameters below as of 16 Jul 2024 04:56  Patient On (Oxygen Delivery Method): room air      ________________________________________________  PHYSICAL EXAM:  GENERAL: NAD  HEENT: Normocephalic;  conjunctivae and sclerae clear; moist mucous membranes;   NECK : supple  CHEST/LUNG: Clear to auscultation bilaterally with good air entry   HEART: S1 S2  regular; no murmurs, gallops or rubs  ABDOMEN: Soft, Nontender, Nondistended; Bowel sounds present  EXTREMITIES: no cyanosis; no edema; no calf tenderness  SKIN: warm and dry; no rash  NERVOUS SYSTEM:  Awake and alert; Oriented  to self ; no new deficits    _________________________________________________  LABS:                        9.2    7.16  )-----------( 265      ( 16 Jul 2024 07:19 )             27.4     07-16    142  |  112<H>  |  12  ----------------------------<  82  3.1<L>   |  22  |  0.57    Ca    7.1<L>      16 Jul 2024 07:19  Phos  2.2     07-16  Mg     1.6     07-16    TPro  5.5<L>  /  Alb  2.5<L>  /  TBili  0.8  /  DBili  x   /  AST  30  /  ALT  22  /  AlkPhos  126<H>  07-15    PT/INR - ( 15 Jul 2024 13:47 )   PT: 13.2 sec;   INR: 1.16 ratio         PTT - ( 15 Jul 2024 13:47 )  PTT:37.1 sec  Urinalysis Basic - ( 16 Jul 2024 07:19 )    Color: x / Appearance: x / SG: x / pH: x  Gluc: 82 mg/dL / Ketone: x  / Bili: x / Urobili: x   Blood: x / Protein: x / Nitrite: x   Leuk Esterase: x / RBC: x / WBC x   Sq Epi: x / Non Sq Epi: x / Bacteria: x      CAPILLARY BLOOD GLUCOSE      RADIOLOGY & ADDITIONAL TESTS:    < from: CT Chest No Cont (07.15.24 @ 15:38) >    ACC: [id number] EXAM:  CT CHEST   ORDERED BY: RUTH WORTHYU     PROCEDURE DATE:  07/15/2024          INTERPRETATION:  HISTORY: Right-sided chest/rib pain. Pancreatic cancer   with lung metastasis.    EXAMINATION: CT CHEST was performed without IV contrast.    COMPARISON: 3/21/2024 CT chest.    FINDINGS:    AIRWAYS, LUNGS, PLEURA: Trachea and mainstem bronchi patent. Small   bilateral pleural effusions, increased.    Innumerable bilateral pulmonary nodules and masses, some of which have   progressed in size compared to 3/21/2024. For example, conglomerate right   upper lobe 4 x 2.3 cm mass was previously 3.2 x 1.8 cm and left upper   lobe 1.6 x 1.5 cm nodular opacity (image 49, series 3) was previously 1.5   x 1 cm.    MEDIASTINUM: Normal heart size. No pericardial effusion. Ascending   thoracic aorta measures 3.9 cm.  No large mediastinal lymph nodes.    IMAGED ABDOMEN: Again noted is pancreatic body mass and dilatation of the   distal pancreatic tail duct similar to 3/21/2024. Trace perihepatic   ascites.    SOFT TISSUES: Unremarkable.    BONES: Left anterior third through seventh rib fractures with evidence of   healing are new compared to 3/21/2024. No acute osseous abnormality   identified.    IMPRESSION:.    Subacute appearing left anterior third through seventh rib fractures,   which are new compared to 3/21/2024. Recommend correlation for history of   trauma.    No evidence of acute osseous abnormality.    Small bilateral pleural effusions have increased in size.    Innumerable bilateral pulmonary nodules and masses, some of which have   progressed in size compared to 3/21/2024.    --- End of Report ---             MCKENZIE FERNÁNDEZ MD; Attending Radiologist  This document has been electronically signed. Jul 15 2024  4:01PM    < end of copied text >  < from: CT Chest No Cont (07.15.24 @ 15:38) >    ACC: [id number] EXAM:  CT CHEST   ORDERED BY: RUTH CAMACHO     PROCEDURE DATE:  07/15/2024          INTERPRETATION:  HISTORY: Right-sided chest/rib pain. Pancreatic cancer   with lung metastasis.    EXAMINATION: CT CHEST was performed without IV contrast.    COMPARISON: 3/21/2024 CT chest.    FINDINGS:    AIRWAYS, LUNGS, PLEURA: Trachea and mainstem bronchi patent. Small   bilateral pleural effusions, increased.    Innumerable bilateral pulmonary nodules and masses, some of which have   progressed in size compared to 3/21/2024. For example, conglomerate right   upper lobe 4 x 2.3 cm mass was previously 3.2 x 1.8 cm and left upper   lobe 1.6 x 1.5 cm nodular opacity (image 49, series 3) was previously 1.5   x 1 cm.    MEDIASTINUM: Normal heart size. No pericardial effusion. Ascending   thoracic aorta measures 3.9 cm.  No large mediastinal lymph nodes.    IMAGED ABDOMEN: Again noted is pancreatic body mass and dilatation of the   distal pancreatic tail duct similar to 3/21/2024. Trace perihepatic   ascites.    SOFT TISSUES: Unremarkable.    BONES: Left anterior third through seventh rib fractures with evidence of   healing are new compared to 3/21/2024. No acute osseous abnormality   identified.    IMPRESSION:.    Subacute appearing left anterior third through seventh rib fractures,   which are new compared to 3/21/2024. Recommend correlation for history of   trauma.    No evidence of acute osseous abnormality.    Small bilateral pleural effusions have increased in size.    Innumerable bilateral pulmonary nodules and masses, some of which have   progressed in size compared to 3/21/2024.    --- End of Report ---       MCKENZIE FERNÁNDEZ MD; Attending Radiologist  This document has been electronically signed. Jul 15 2024  4:01PM    < end of copied text >  Imaging  Reviewed:  YES  Consultant(s) Notes Reviewed:   YES  Plan of care was discussed with patient, wife and son at bedside ; all questions and concerns were addressed

## 2024-07-20 NOTE — CHART NOTE - NSCHARTNOTEFT_GEN_A_CORE
Pt visible and appropriately social throughout evening. Pt is calm and cooperative, denies SI/HI/AH/VH and current unmet needs other than wanting to video chat with his daughter tomorrow. Pt seen speaking on phone and later reading in room. Pt is medication adherent, education provided verbally.    s/p PCI /stent pLAD  reported 1/10 residual chest discomfort upon arrival to [facility], which resolved within minutes. Had 7/10 CP in lab during procedure + given NTG    EKG- no new ischemic changes  VSS    RRA site- no hematoma, no bleeding   +radial pulse intact  cap refill <2 secs    continue ASA/Plavix/statin  will continue to monitor s/p PCI /stent x 1 to [facility]  reported 1/10 residual chest discomfort upon arrival to [facility], which resolved within minutes. Had 7/10 CP in lab during procedure + given NTG with good effect. Dr Lam aware. Trend CK+ CKMB overnight. Continue to monitor.     EKG- no new ischemic changes  VSS    RRA site- no hematoma, no bleeding   +radial pulse intact  cap refill <2 secs    continue ASA/Plavix/statin  will continue to monitor    Admitted with [facility]-now in sinus. Spoke with Dr Lam   To start Eliquis 5mg po BID in am - if radial site is stable   discharge on ASA/Plavix/Eliquis x 1 month, then plavix and Eliquis alone.     Update 16:50  patient is feeling well - no complaint of CP or SOB s/p PCI /stent x 1 to Jett  reported 1/10 residual chest discomfort upon arrival to [facility], which resolved within minutes. Had 7/10 CP in lab during procedure + given NTG with good effect. Dr Lam aware. Trend CK+ CKMB overnight. Continue to monitor.     EKG- no new ischemic changes  VSS    RRA site- no hematoma, no bleeding   +radial pulse intact  cap refill <2 secs    continue ASA/Plavix/statin  will continue to monitor    Admitted with LESA-now in sinus. Spoke with Dr Lam   To start Eliquis 5mg po BID in am - if radial site is stable   discharge on ASA/Plavix/Eliquis x 1 month, then plavix and Eliquis alone.   TTE ordered    Update 16:50  patient is feeling well - no complaint of CP or SOB

## 2024-08-23 NOTE — DISCHARGE NOTE PROVIDER - DETAILS OF MALNUTRITION DIAGNOSIS/DIAGNOSES
Airway  Urgency: emergent    Date/Time: 8/23/2024 1:43 PM  End Time:8/23/2024 1:44 PM  Airway not difficult    General Information and Staff    CRNA/CAA: Evelyn Tomas CRNA    Consent for Airway (if performed for an anesthetic, see related documentation for consents)  Patient identity confirmed: arm band, hospital-assigned identification number and provided demographic data  Consent: The procedure was performed in an emergent situation. Written consent not obtained.  Risks and benefits: risks, benefits and alternatives were not discussed      Indications and Patient Condition  Indications for airway management: cardio/pulmonary arrest    Preoxygenated: yes  MILS maintained throughout  Mask difficulty assessment: 2 - vent by mask + OA or adjuvant +/- NMBA    Final Airway Details  Final airway type: endotracheal airway      Successful airway: ETT  Cuffed: yes   Successful intubation technique: direct laryngoscopy  Facilitating devices/methods: intubating stylet  Endotracheal tube insertion site: oral  Blade: Efrem  Blade size: 3  ETT size (mm): 8.0  Cormack-Lehane Classification: grade IIa - partial view of glottis  Placement verified by: chest auscultation, capnometry and palpation of cuff   Inital cuff pressure (cm H2O): 22  Measured from: gums  Number of attempts at approach: 1  Assessment: lips, teeth, and gum same as pre-op and atraumatic intubation    Additional Comments  Riana Pavon Evac O)ral Tracheal Tube           This patient has been assessed with a concern for Malnutrition and was treated during this hospitalization for the following Nutrition diagnosis/diagnoses:     -  07/17/2024: Severe protein-calorie malnutrition

## 2024-09-29 PROCEDURE — 86850 RBC ANTIBODY SCREEN: CPT

## 2024-09-29 PROCEDURE — 86900 BLOOD TYPING SEROLOGIC ABO: CPT

## 2024-09-29 PROCEDURE — 83735 ASSAY OF MAGNESIUM: CPT

## 2024-09-29 PROCEDURE — 80048 BASIC METABOLIC PNL TOTAL CA: CPT

## 2024-09-29 PROCEDURE — 85027 COMPLETE CBC AUTOMATED: CPT

## 2024-09-29 PROCEDURE — 84484 ASSAY OF TROPONIN QUANT: CPT

## 2024-09-29 PROCEDURE — 70450 CT HEAD/BRAIN W/O DYE: CPT | Mod: MC

## 2024-09-29 PROCEDURE — 87635 SARS-COV-2 COVID-19 AMP PRB: CPT

## 2024-09-29 PROCEDURE — 86901 BLOOD TYPING SEROLOGIC RH(D): CPT

## 2024-09-29 PROCEDURE — 97162 PT EVAL MOD COMPLEX 30 MIN: CPT

## 2024-09-29 PROCEDURE — 83880 ASSAY OF NATRIURETIC PEPTIDE: CPT

## 2024-09-29 PROCEDURE — 99285 EMERGENCY DEPT VISIT HI MDM: CPT | Mod: 25

## 2024-09-29 PROCEDURE — 82550 ASSAY OF CK (CPK): CPT

## 2024-09-29 PROCEDURE — 71250 CT THORAX DX C-: CPT | Mod: MC

## 2024-09-29 PROCEDURE — 85025 COMPLETE CBC W/AUTO DIFF WBC: CPT

## 2024-09-29 PROCEDURE — 83690 ASSAY OF LIPASE: CPT

## 2024-09-29 PROCEDURE — 84100 ASSAY OF PHOSPHORUS: CPT

## 2024-09-29 PROCEDURE — 85610 PROTHROMBIN TIME: CPT

## 2024-09-29 PROCEDURE — 85730 THROMBOPLASTIN TIME PARTIAL: CPT

## 2024-09-29 PROCEDURE — 81001 URINALYSIS AUTO W/SCOPE: CPT

## 2024-09-29 PROCEDURE — 80053 COMPREHEN METABOLIC PANEL: CPT

## 2024-09-29 PROCEDURE — 36415 COLL VENOUS BLD VENIPUNCTURE: CPT

## 2024-09-29 PROCEDURE — 82009 KETONE BODYS QUAL: CPT

## 2024-10-17 NOTE — DISCHARGE NOTE NURSING/CASE MANAGEMENT/SOCIAL WORK - NSDCPNDISPN_GEN_ALL_CORE
Education provided on the pain management plan of care/Side effects of pain management treatment/Activities of daily living, including home environment that might     exacerbate pain or reduce effectiveness of the pain management plan of care as well as strategies to address these issues/Safe use, storage and disposal of opioids when prescribed/Opioids not applicable/not prescribed on the discharge service for the patient. I have reviewed and made amendments to the documentation where necessary.

## (undated) DEVICE — SYR LUER LOK 3CC

## (undated) DEVICE — CATH IV SAFE BC 22G X 1" (BLUE)

## (undated) DEVICE — ANESTHESIA CIRCUIT ADULT HMEF

## (undated) DEVICE — DRSG CURITY GAUZE SPONGE 4 X 4" 12-PLY NON-STERILE

## (undated) DEVICE — Device

## (undated) DEVICE — SYS BIOPSY NDL FILE SS STRL 22G

## (undated) DEVICE — NDL HYPO SAFE 22G X 1" (BLACK)

## (undated) DEVICE — DENTURE CUP PINK

## (undated) DEVICE — UNDERPAD LINEN SAVER 17 X 24"

## (undated) DEVICE — KIT ENDO PROCEDURE CUST W/VLV

## (undated) DEVICE — SALIVA EJECTOR (BLUE)

## (undated) DEVICE — GOWN LG

## (undated) DEVICE — SOL IRR POUR NS 0.9% 500ML

## (undated) DEVICE — BALLOON US ENDO

## (undated) DEVICE — BITE BLOCK ADULT 20 X 27MM (GREEN)

## (undated) DEVICE — LABELS BLANK W PEN

## (undated) DEVICE — TUBING IV SET GRAVITY 1Y 78" MACRO

## (undated) DEVICE — NDL ASPIR EXPECT SLIMLINE 22G

## (undated) DEVICE — DRSG 2X2

## (undated) DEVICE — SOL INJ NS 0.9% 500ML 1-PORT

## (undated) DEVICE — WARMING BLANKET FULL ADULT

## (undated) DEVICE — SOLIDIFIER ISOLYZER 2000 CC

## (undated) DEVICE — TUBING MEDI-VAC W MAXIGRIP CONNECTORS 1/4"X6'

## (undated) DEVICE — CONTAINER FORMALIN 10% 20ML

## (undated) DEVICE — VENODYNE/SCD SLEEVE CALF MEDIUM

## (undated) DEVICE — PACK IV START WITH CHG